# Patient Record
Sex: MALE | Race: WHITE | Employment: OTHER | URBAN - METROPOLITAN AREA
[De-identification: names, ages, dates, MRNs, and addresses within clinical notes are randomized per-mention and may not be internally consistent; named-entity substitution may affect disease eponyms.]

---

## 2020-01-26 ENCOUNTER — APPOINTMENT (OUTPATIENT)
Dept: GENERAL RADIOLOGY | Age: 75
End: 2020-01-26
Payer: COMMERCIAL

## 2020-01-26 ENCOUNTER — HOSPITAL ENCOUNTER (EMERGENCY)
Age: 75
Discharge: HOME | End: 2020-01-27
Attending: EMERGENCY MEDICINE
Payer: COMMERCIAL

## 2020-01-26 VITALS
HEART RATE: 81 BPM | BODY MASS INDEX: 29.18 KG/M2 | DIASTOLIC BLOOD PRESSURE: 62 MMHG | RESPIRATION RATE: 17 BRPM | WEIGHT: 197 LBS | TEMPERATURE: 98.1 F | OXYGEN SATURATION: 94 % | SYSTOLIC BLOOD PRESSURE: 156 MMHG | HEIGHT: 69 IN

## 2020-01-26 LAB
ALBUMIN SERPL-MCNC: 4.5 GM/DL (ref 3.4–5)
ALP BLD-CCNC: 122 IU/L (ref 40–129)
ALT SERPL-CCNC: 36 U/L (ref 10–40)
ANION GAP SERPL CALCULATED.3IONS-SCNC: 14 MMOL/L (ref 4–16)
AST SERPL-CCNC: 73 IU/L (ref 15–37)
BASOPHILS ABSOLUTE: 0 K/CU MM
BASOPHILS RELATIVE PERCENT: 0.3 % (ref 0–1)
BILIRUB SERPL-MCNC: 1.6 MG/DL (ref 0–1)
BUN BLDV-MCNC: 15 MG/DL (ref 6–23)
CALCIUM SERPL-MCNC: 9.2 MG/DL (ref 8.3–10.6)
CHLORIDE BLD-SCNC: 96 MMOL/L (ref 99–110)
CO2: 25 MMOL/L (ref 21–32)
CREAT SERPL-MCNC: 0.8 MG/DL (ref 0.9–1.3)
DIFFERENTIAL TYPE: ABNORMAL
EOSINOPHILS ABSOLUTE: 0.1 K/CU MM
EOSINOPHILS RELATIVE PERCENT: 1.2 % (ref 0–3)
GFR AFRICAN AMERICAN: >60 ML/MIN/1.73M2
GFR NON-AFRICAN AMERICAN: >60 ML/MIN/1.73M2
GLUCOSE BLD-MCNC: 154 MG/DL (ref 70–99)
HCT VFR BLD CALC: 39.9 % (ref 42–52)
HEMOGLOBIN: 12.7 GM/DL (ref 13.5–18)
IMMATURE NEUTROPHIL %: 0.3 % (ref 0–0.43)
INR BLD: 3.18 INDEX
LYMPHOCYTES ABSOLUTE: 1.1 K/CU MM
LYMPHOCYTES RELATIVE PERCENT: 18.2 % (ref 24–44)
MCH RBC QN AUTO: 31.1 PG (ref 27–31)
MCHC RBC AUTO-ENTMCNC: 31.8 % (ref 32–36)
MCV RBC AUTO: 97.8 FL (ref 78–100)
MONOCYTES ABSOLUTE: 0.3 K/CU MM
MONOCYTES RELATIVE PERCENT: 4.4 % (ref 0–4)
NUCLEATED RBC %: 0 %
PDW BLD-RTO: 14 % (ref 11.7–14.9)
PLATELET # BLD: 156 K/CU MM (ref 140–440)
PMV BLD AUTO: 11.4 FL (ref 7.5–11.1)
POTASSIUM SERPL-SCNC: 4 MMOL/L (ref 3.5–5.1)
PROTHROMBIN TIME: 38.9 SECONDS (ref 11.7–14.5)
RBC # BLD: 4.08 M/CU MM (ref 4.6–6.2)
SEGMENTED NEUTROPHILS ABSOLUTE COUNT: 4.5 K/CU MM
SEGMENTED NEUTROPHILS RELATIVE PERCENT: 75.6 % (ref 36–66)
SODIUM BLD-SCNC: 135 MMOL/L (ref 135–145)
TOTAL IMMATURE NEUTOROPHIL: 0.02 K/CU MM
TOTAL NUCLEATED RBC: 0 K/CU MM
TOTAL PROTEIN: 7.2 GM/DL (ref 6.4–8.2)
TROPONIN T: <0.01 NG/ML
TROPONIN T: <0.01 NG/ML
WBC # BLD: 6 K/CU MM (ref 4–10.5)

## 2020-01-26 PROCEDURE — 84484 ASSAY OF TROPONIN QUANT: CPT

## 2020-01-26 PROCEDURE — 71045 X-RAY EXAM CHEST 1 VIEW: CPT

## 2020-01-26 PROCEDURE — 36415 COLL VENOUS BLD VENIPUNCTURE: CPT

## 2020-01-26 PROCEDURE — 93005 ELECTROCARDIOGRAM TRACING: CPT | Performed by: EMERGENCY MEDICINE

## 2020-01-26 PROCEDURE — 85025 COMPLETE CBC W/AUTO DIFF WBC: CPT

## 2020-01-26 PROCEDURE — 80053 COMPREHEN METABOLIC PANEL: CPT

## 2020-01-26 PROCEDURE — 85610 PROTHROMBIN TIME: CPT

## 2020-01-26 PROCEDURE — 6370000000 HC RX 637 (ALT 250 FOR IP): Performed by: EMERGENCY MEDICINE

## 2020-01-26 PROCEDURE — 99285 EMERGENCY DEPT VISIT HI MDM: CPT

## 2020-01-26 RX ORDER — POLYETHYLENE GLYCOL 3350 17 G/17G
17 POWDER, FOR SOLUTION ORAL DAILY PRN
COMMUNITY

## 2020-01-26 RX ORDER — LORATADINE 10 MG/1
10 CAPSULE, LIQUID FILLED ORAL DAILY
COMMUNITY

## 2020-01-26 RX ORDER — LANOLIN ALCOHOL/MO/W.PET/CERES
325 CREAM (GRAM) TOPICAL
COMMUNITY

## 2020-01-26 RX ORDER — CYCLOBENZAPRINE HCL 10 MG
10 TABLET ORAL ONCE
Status: COMPLETED | OUTPATIENT
Start: 2020-01-26 | End: 2020-01-26

## 2020-01-26 RX ORDER — CYCLOBENZAPRINE HCL 10 MG
10 TABLET ORAL 3 TIMES DAILY PRN
Qty: 21 TABLET | Refills: 0 | Status: SHIPPED | OUTPATIENT
Start: 2020-01-26 | End: 2020-02-05

## 2020-01-26 RX ORDER — TAMSULOSIN HYDROCHLORIDE 0.4 MG/1
0.4 CAPSULE ORAL DAILY
COMMUNITY

## 2020-01-26 RX ORDER — GABAPENTIN 400 MG/1
800 CAPSULE ORAL 3 TIMES DAILY
COMMUNITY

## 2020-01-26 RX ORDER — FINASTERIDE 5 MG/1
5 TABLET, FILM COATED ORAL DAILY
COMMUNITY

## 2020-01-26 RX ORDER — MECLIZINE HCL 12.5 MG/1
12.5 TABLET ORAL 2 TIMES DAILY
COMMUNITY

## 2020-01-26 RX ORDER — AMITRIPTYLINE HYDROCHLORIDE 25 MG/1
25 TABLET, FILM COATED ORAL NIGHTLY
COMMUNITY

## 2020-01-26 RX ORDER — WARFARIN SODIUM 2.5 MG/1
3 TABLET ORAL DAILY
COMMUNITY

## 2020-01-26 RX ORDER — AMLODIPINE BESYLATE 5 MG/1
5 TABLET ORAL DAILY
COMMUNITY

## 2020-01-26 RX ORDER — GUAIFENESIN 600 MG/1
600 TABLET, EXTENDED RELEASE ORAL 2 TIMES DAILY
COMMUNITY

## 2020-01-26 RX ORDER — OMEPRAZOLE 20 MG/1
20 CAPSULE, DELAYED RELEASE ORAL DAILY
COMMUNITY

## 2020-01-26 RX ORDER — CELECOXIB 200 MG/1
200 CAPSULE ORAL DAILY
COMMUNITY

## 2020-01-26 RX ORDER — PRENATAL VIT 91/IRON/FOLIC/DHA 28-975-200
COMBINATION PACKAGE (EA) ORAL 2 TIMES DAILY
COMMUNITY

## 2020-01-26 RX ORDER — ATORVASTATIN CALCIUM 10 MG/1
10 TABLET, FILM COATED ORAL DAILY
COMMUNITY

## 2020-01-26 RX ADMIN — CYCLOBENZAPRINE HYDROCHLORIDE 10 MG: 10 TABLET, FILM COATED ORAL at 20:07

## 2020-01-26 ASSESSMENT — PAIN DESCRIPTION - DESCRIPTORS: DESCRIPTORS: DISCOMFORT

## 2020-01-26 ASSESSMENT — ENCOUNTER SYMPTOMS
VOMITING: 0
EYE PAIN: 0
EYE DISCHARGE: 0
SORE THROAT: 0
ABDOMINAL PAIN: 0
BACK PAIN: 1
RHINORRHEA: 0
SHORTNESS OF BREATH: 0
NAUSEA: 0
COUGH: 0

## 2020-01-26 ASSESSMENT — PAIN - FUNCTIONAL ASSESSMENT: PAIN_FUNCTIONAL_ASSESSMENT: ACTIVITIES ARE NOT PREVENTED

## 2020-01-26 ASSESSMENT — PAIN SCALES - GENERAL: PAINLEVEL_OUTOF10: 3

## 2020-01-26 ASSESSMENT — PAIN DESCRIPTION - ORIENTATION: ORIENTATION: UPPER;LEFT;RIGHT

## 2020-01-26 ASSESSMENT — PAIN DESCRIPTION - PAIN TYPE: TYPE: ACUTE PAIN

## 2020-01-26 ASSESSMENT — PAIN DESCRIPTION - FREQUENCY: FREQUENCY: CONTINUOUS

## 2020-01-26 ASSESSMENT — PAIN DESCRIPTION - ONSET: ONSET: SUDDEN

## 2020-01-26 ASSESSMENT — PAIN DESCRIPTION - LOCATION: LOCATION: CHEST

## 2020-01-26 ASSESSMENT — PAIN DESCRIPTION - PROGRESSION: CLINICAL_PROGRESSION: GRADUALLY IMPROVING

## 2020-01-26 NOTE — ED NOTES
Bed: ED-34  Expected date:   Expected time:   Means of arrival:   Comments:  [facility]  01/26/20 5004

## 2020-01-26 NOTE — ED TRIAGE NOTES
Patient arrival via ems. Per EMS, pt was at dinner when he had a sudden onset of chest pain.  No meds given in route, pt is [facility]

## 2020-01-27 PROCEDURE — 93010 ELECTROCARDIOGRAM REPORT: CPT | Performed by: INTERNAL MEDICINE

## 2020-01-27 NOTE — ED PROVIDER NOTES
confusion. Except as noted above the remainder of the review of systems was reviewed and negative. PAST MEDICAL HISTORY     Past Medical History:   Diagnosis Date    Allergic rhinitis     Anxiety     BPH (benign prostatic hyperplasia)     CAD (coronary artery disease)     Dermatitis     GERD (gastroesophageal reflux disease)     Hyperlipidemia     Hypertension     Idiopathic peripheral autonomic neuropathy     Insomnia     Meniere's disease     Osteoarthritis     Polyneuropathy     Pulmonary embolism (HCC)     Urinary retention     Vitamin B12 deficiency anemia        Prior to Admission medications    Medication Sig Start Date End Date Taking?  Authorizing Provider   warfarin (COUMADIN) 2.5 MG tablet Take 6.5 mg by mouth daily   Yes Historical Provider, MD   amitriptyline (ELAVIL) 25 MG tablet Take 25 mg by mouth nightly   Yes Historical Provider, MD   amLODIPine (NORVASC) 5 MG tablet Take 5 mg by mouth daily   Yes Historical Provider, MD   atorvastatin (LIPITOR) 10 MG tablet Take 10 mg by mouth daily   Yes Historical Provider, MD   bisacodyl (DULCOLAX) 5 MG EC tablet Take 10 mg by mouth daily   Yes Historical Provider, MD   celecoxib (CELEBREX) 200 MG capsule Take 200 mg by mouth daily   Yes Historical Provider, MD   ferrous sulfate 325 (65 Fe) MG EC tablet Take 325 mg by mouth daily (with breakfast)   Yes Historical Provider, MD   finasteride (PROSCAR) 5 MG tablet Take 5 mg by mouth daily   Yes Historical Provider, MD   loratadine (CLARITIN) 10 MG capsule Take 10 mg by mouth daily   Yes Historical Provider, MD   omeprazole (PRILOSEC) 20 MG delayed release capsule Take 20 mg by mouth Daily   Yes Historical Provider, MD   tamsulosin (FLOMAX) 0.4 MG capsule Take 0.4 mg by mouth daily   Yes Historical Provider, MD   Cholecalciferol (VITAMIN D3) 125 MCG (5000 UT) TABS Take 5,000 Units by mouth daily   Yes Historical Provider, MD   meclizine (ANTIVERT) 12.5 MG tablet Take 12.5 mg by mouth 2 SUSPENSION    Take 30 mLs by mouth daily as needed for Constipation    MECLIZINE (ANTIVERT) 12.5 MG TABLET    Take 12.5 mg by mouth 2 times daily    OMEPRAZOLE (PRILOSEC) 20 MG DELAYED RELEASE CAPSULE    Take 20 mg by mouth Daily    POLYETHYLENE GLYCOL (GLYCOLAX) PACKET    Take 17 g by mouth daily as needed for Constipation    SODIUM BICARBONATE-CITRIC ACID (LEODAN-SELTZER HEARTBURN PO)    Take 2 tablets by mouth daily as needed    TAMSULOSIN (FLOMAX) 0.4 MG CAPSULE    Take 0.4 mg by mouth daily    TERBINAFINE (LAMISIL) 1 % CREAM    Apply topically 2 times daily Apply topically 2 times daily. WARFARIN (COUMADIN) 2.5 MG TABLET    Take 6.5 mg by mouth daily       ALLERGIES     Sulfa antibiotics and Tramadol    FAMILY HISTORY     No family history on file. SOCIAL HISTORY       Social History     Socioeconomic History    Marital status:       Spouse name: Not on file    Number of children: Not on file    Years of education: Not on file    Highest education level: Not on file   Occupational History    Not on file   Social Needs    Financial resource strain: Not on file    Food insecurity:     Worry: Not on file     Inability: Not on file    Transportation needs:     Medical: Not on file     Non-medical: Not on file   Tobacco Use    Smoking status: Not on file   Substance and Sexual Activity    Alcohol use: Not on file    Drug use: Not on file    Sexual activity: Not on file   Lifestyle    Physical activity:     Days per week: Not on file     Minutes per session: Not on file    Stress: Not on file   Relationships    Social connections:     Talks on phone: Not on file     Gets together: Not on file     Attends Holiness service: Not on file     Active member of club or organization: Not on file     Attends meetings of clubs or organizations: Not on file     Relationship status: Not on file    Intimate partner violence:     Fear of current or ex partner: Not on file     Emotionally abused: Not on file     Physically abused: Not on file     Forced sexual activity: Not on file   Other Topics Concern    Not on file   Social History Narrative    Not on file       SCREENINGS    Paul Coma Scale  Eye Opening: Spontaneous  Best Verbal Response: Oriented  Best Motor Response: Obeys commands  [place] Coma Scale Score: 15          PHYSICAL EXAM    (up to 7 for level 4, 8 or more for level 5)     ED Triage Vitals [01/26/20 1844]   BP Temp Temp Source Pulse Resp SpO2 Height Weight   (!) 142/83 98.1 °F (36.7 °C) Oral 73 12 94 % 5' 9\" (1.753 m) 197 lb (89.4 kg)       Physical Exam  Vitals signs reviewed. Constitutional:       Appearance: He is not ill-appearing or toxic-appearing. HENT:      Head: Normocephalic and atraumatic. Nose: No congestion or rhinorrhea. Mouth/Throat:      Mouth: Mucous membranes are moist.      Pharynx: No oropharyngeal exudate or posterior oropharyngeal erythema. Eyes:      General:         Right eye: No discharge. Left eye: No discharge. Extraocular Movements: Extraocular movements intact. Pupils: Pupils are equal, round, and reactive to light. Neck:      Musculoskeletal: Normal range of motion. No neck rigidity. Cardiovascular:      Rate and Rhythm: Normal rate. Heart sounds: No friction rub. No gallop. Chest:      Chest wall: No tenderness. Abdominal:      Palpations: Abdomen is soft. Tenderness: There is no abdominal tenderness. There is no guarding. Comments: Abdomen soft, non-tender, non-peritoneal  No guarding on abdominal exam   Musculoskeletal:         General: No tenderness. Left lower leg: No edema. Lymphadenopathy:      Cervical: No cervical adenopathy. Skin:     General: Skin is warm. Capillary Refill: Capillary refill takes less than 2 seconds. Findings: No erythema, lesion or rash. Neurological:      General: No focal deficit present.       Mental Status: He is alert and oriented to person, place, She remains vigilant about watching the area for any kind skin eruptions. She is treated symptomatically in the emergency department with improvement in her symptoms. She is discharged home. She is provided simpler measures for home. She will follow-up with her primary care physician. Strict return precautions for worsening concerns were discussed. She is discharged home in stable condition. Amount and/or Complexity of Data Reviewed  Clinical lab tests: reviewed  Tests in the radiology section of CPT®: reviewed          CONSULTS:  None    PROCEDURES:  None performed unless otherwise noted below     Procedures        FINAL IMPRESSION      1. Chest pain, unspecified type          DISPOSITION/PLAN   DISPOSITION        PATIENT REFERRED TO:  No follow-up provider specified. DISCHARGE MEDICATIONS:  New Prescriptions    No medications on file       ED Provider Disposition Time  DISPOSITION        The Patient was instructed to read the package inserts with any medication that was prescribed. Major potential reactions and medication interactions were discussed. The Patient understands that there are numerous possible adverse reactions not covered. The patient was also instructed to arrange follow-up with his or her primary care provider for review of any pending labwork or incidental findings on any radiology results that were obtained. All efforts were made to discuss any incidental findings that require further monitoring. Controlled Substances Monitoring:     No flowsheet data found.     (Please note that portions of this note were completed with a voice recognition program.  Efforts were made to edit the dictations but occasionally words are mis-transcribed.)    Brett Liang MD (electronically signed)  Attending Emergency Physician           Brett Liang MD  01/26/20 6701

## 2020-01-28 ENCOUNTER — APPOINTMENT (OUTPATIENT)
Dept: ULTRASOUND IMAGING | Age: 75
DRG: 446 | End: 2020-01-28
Payer: MEDICARE

## 2020-01-28 ENCOUNTER — APPOINTMENT (OUTPATIENT)
Dept: CT IMAGING | Age: 75
DRG: 446 | End: 2020-01-28
Payer: MEDICARE

## 2020-01-28 ENCOUNTER — HOSPITAL ENCOUNTER (INPATIENT)
Age: 75
LOS: 4 days | Discharge: HOME HEALTH | DRG: 446 | End: 2020-02-01
Attending: EMERGENCY MEDICINE | Admitting: INTERNAL MEDICINE
Payer: MEDICARE

## 2020-01-28 PROBLEM — R17 JAUNDICE: Status: ACTIVE | Noted: 2020-01-28

## 2020-01-28 LAB
ALBUMIN SERPL-MCNC: 3.8 GM/DL (ref 3.4–5)
ALP BLD-CCNC: 165 IU/L (ref 40–129)
ALT SERPL-CCNC: 198 U/L (ref 10–40)
ANION GAP SERPL CALCULATED.3IONS-SCNC: 14 MMOL/L (ref 4–16)
APTT: 63.8 SECONDS (ref 25.1–37.1)
AST SERPL-CCNC: 63 IU/L (ref 15–37)
BACTERIA: NEGATIVE /HPF
BASOPHILS ABSOLUTE: 0 K/CU MM
BASOPHILS RELATIVE PERCENT: 0.4 % (ref 0–1)
BILIRUB SERPL-MCNC: 10 MG/DL (ref 0–1)
BILIRUBIN URINE: ABNORMAL MG/DL
BLOOD, URINE: NEGATIVE
BUN BLDV-MCNC: 10 MG/DL (ref 6–23)
CALCIUM SERPL-MCNC: 8.6 MG/DL (ref 8.3–10.6)
CHLORIDE BLD-SCNC: 100 MMOL/L (ref 99–110)
CLARITY: CLEAR
CO2: 23 MMOL/L (ref 21–32)
COLOR: ABNORMAL
CREAT SERPL-MCNC: 0.7 MG/DL (ref 0.9–1.3)
DIFFERENTIAL TYPE: ABNORMAL
EOSINOPHILS ABSOLUTE: 0.3 K/CU MM
EOSINOPHILS RELATIVE PERCENT: 5.6 % (ref 0–3)
GFR AFRICAN AMERICAN: >60 ML/MIN/1.73M2
GFR NON-AFRICAN AMERICAN: >60 ML/MIN/1.73M2
GLUCOSE BLD-MCNC: 122 MG/DL (ref 70–99)
GLUCOSE, URINE: NEGATIVE MG/DL
HAV IGM SER IA-ACNC: NON REACTIVE
HCT VFR BLD CALC: 44.6 % (ref 42–52)
HEMOGLOBIN: 13.9 GM/DL (ref 13.5–18)
HEPATITIS B CORE IGM ANTIBODY: NON REACTIVE
HEPATITIS B SURFACE ANTIGEN: NON REACTIVE
HEPATITIS C ANTIBODY: NON REACTIVE
ICTOTEST: POSITIVE
IMMATURE NEUTROPHIL %: 0.4 % (ref 0–0.43)
INR BLD: 2.18 INDEX
KETONES, URINE: NEGATIVE MG/DL
LEUKOCYTE ESTERASE, URINE: NEGATIVE
LIPASE: 13 IU/L (ref 13–60)
LYMPHOCYTES ABSOLUTE: 1.2 K/CU MM
LYMPHOCYTES RELATIVE PERCENT: 22.4 % (ref 24–44)
MCH RBC QN AUTO: 30.3 PG (ref 27–31)
MCHC RBC AUTO-ENTMCNC: 31.2 % (ref 32–36)
MCV RBC AUTO: 97.4 FL (ref 78–100)
MONOCYTES ABSOLUTE: 0.3 K/CU MM
MONOCYTES RELATIVE PERCENT: 5.3 % (ref 0–4)
NITRITE URINE, QUANTITATIVE: NEGATIVE
NUCLEATED RBC %: 0 %
PDW BLD-RTO: 14.4 % (ref 11.7–14.9)
PH, URINE: 6 (ref 5–8)
PLATELET # BLD: 194 K/CU MM (ref 140–440)
PMV BLD AUTO: 10.7 FL (ref 7.5–11.1)
POTASSIUM SERPL-SCNC: 3.9 MMOL/L (ref 3.5–5.1)
PROTEIN UA: 30 MG/DL
PROTHROMBIN TIME: 26.6 SECONDS (ref 11.7–14.5)
RBC # BLD: 4.58 M/CU MM (ref 4.6–6.2)
RBC URINE: 1 /HPF (ref 0–3)
SEGMENTED NEUTROPHILS ABSOLUTE COUNT: 3.5 K/CU MM
SEGMENTED NEUTROPHILS RELATIVE PERCENT: 65.9 % (ref 36–66)
SODIUM BLD-SCNC: 137 MMOL/L (ref 135–145)
SPECIFIC GRAVITY UA: 1.03 (ref 1–1.03)
SPECIFIC GRAVITY UA: ABNORMAL (ref 1–1.03)
TOTAL IMMATURE NEUTOROPHIL: 0.02 K/CU MM
TOTAL NUCLEATED RBC: 0 K/CU MM
TOTAL PROTEIN: 6.9 GM/DL (ref 6.4–8.2)
TRICHOMONAS: ABNORMAL /HPF
UROBILINOGEN, URINE: 2 MG/DL (ref 0.2–1)
WBC # BLD: 5.3 K/CU MM (ref 4–10.5)
WBC UA: 1 /HPF (ref 0–2)

## 2020-01-28 PROCEDURE — 81001 URINALYSIS AUTO W/SCOPE: CPT

## 2020-01-28 PROCEDURE — 1200000000 HC SEMI PRIVATE

## 2020-01-28 PROCEDURE — 99285 EMERGENCY DEPT VISIT HI MDM: CPT

## 2020-01-28 PROCEDURE — 74177 CT ABD & PELVIS W/CONTRAST: CPT

## 2020-01-28 PROCEDURE — 36415 COLL VENOUS BLD VENIPUNCTURE: CPT

## 2020-01-28 PROCEDURE — 80053 COMPREHEN METABOLIC PANEL: CPT

## 2020-01-28 PROCEDURE — 85610 PROTHROMBIN TIME: CPT

## 2020-01-28 PROCEDURE — 80074 ACUTE HEPATITIS PANEL: CPT

## 2020-01-28 PROCEDURE — 85025 COMPLETE CBC W/AUTO DIFF WBC: CPT

## 2020-01-28 PROCEDURE — 85730 THROMBOPLASTIN TIME PARTIAL: CPT

## 2020-01-28 PROCEDURE — 76705 ECHO EXAM OF ABDOMEN: CPT

## 2020-01-28 PROCEDURE — 6360000004 HC RX CONTRAST MEDICATION: Performed by: PHYSICIAN ASSISTANT

## 2020-01-28 PROCEDURE — 83690 ASSAY OF LIPASE: CPT

## 2020-01-28 RX ORDER — TAMSULOSIN HYDROCHLORIDE 0.4 MG/1
0.4 CAPSULE ORAL DAILY
Status: DISCONTINUED | OUTPATIENT
Start: 2020-01-29 | End: 2020-02-01 | Stop reason: HOSPADM

## 2020-01-28 RX ORDER — SODIUM CHLORIDE 0.9 % (FLUSH) 0.9 %
10 SYRINGE (ML) INJECTION EVERY 12 HOURS SCHEDULED
Status: DISCONTINUED | OUTPATIENT
Start: 2020-01-28 | End: 2020-02-01 | Stop reason: HOSPADM

## 2020-01-28 RX ORDER — ONDANSETRON 2 MG/ML
4 INJECTION INTRAMUSCULAR; INTRAVENOUS EVERY 6 HOURS PRN
Status: DISCONTINUED | OUTPATIENT
Start: 2020-01-28 | End: 2020-02-01 | Stop reason: HOSPADM

## 2020-01-28 RX ORDER — ACETAMINOPHEN 325 MG/1
650 TABLET ORAL EVERY 4 HOURS PRN
Status: DISCONTINUED | OUTPATIENT
Start: 2020-01-28 | End: 2020-02-01 | Stop reason: HOSPADM

## 2020-01-28 RX ORDER — SODIUM CHLORIDE 0.9 % (FLUSH) 0.9 %
10 SYRINGE (ML) INJECTION PRN
Status: DISCONTINUED | OUTPATIENT
Start: 2020-01-28 | End: 2020-02-01 | Stop reason: HOSPADM

## 2020-01-28 RX ORDER — AMLODIPINE BESYLATE 5 MG/1
5 TABLET ORAL DAILY
Status: DISCONTINUED | OUTPATIENT
Start: 2020-01-29 | End: 2020-02-01 | Stop reason: HOSPADM

## 2020-01-28 RX ORDER — ATORVASTATIN CALCIUM 10 MG/1
10 TABLET, FILM COATED ORAL DAILY
Status: DISCONTINUED | OUTPATIENT
Start: 2020-01-29 | End: 2020-02-01 | Stop reason: HOSPADM

## 2020-01-28 RX ADMIN — IOPAMIDOL 75 ML: 755 INJECTION, SOLUTION INTRAVENOUS at 20:30

## 2020-01-28 SDOH — HEALTH STABILITY: MENTAL HEALTH: HOW OFTEN DO YOU HAVE A DRINK CONTAINING ALCOHOL?: NEVER

## 2020-01-28 ASSESSMENT — PAIN SCALES - GENERAL
PAINLEVEL_OUTOF10: 0
PAINLEVEL_OUTOF10: 0

## 2020-01-29 ENCOUNTER — APPOINTMENT (OUTPATIENT)
Dept: MRI IMAGING | Age: 75
DRG: 446 | End: 2020-01-29
Payer: MEDICARE

## 2020-01-29 LAB
ALBUMIN SERPL-MCNC: 3.8 GM/DL (ref 3.4–5)
ALP BLD-CCNC: 148 IU/L (ref 40–128)
ALT SERPL-CCNC: 158 U/L (ref 10–40)
ANION GAP SERPL CALCULATED.3IONS-SCNC: 11 MMOL/L (ref 4–16)
AST SERPL-CCNC: 44 IU/L (ref 15–37)
BASOPHILS ABSOLUTE: 0 K/CU MM
BASOPHILS RELATIVE PERCENT: 0.4 % (ref 0–1)
BILIRUB SERPL-MCNC: 7.4 MG/DL (ref 0–1)
BILIRUB SERPL-MCNC: 7.4 MG/DL (ref 0–1)
BILIRUBIN DIRECT: 5.7 MG/DL (ref 0–0.3)
BILIRUBIN, INDIRECT: 1.7 MG/DL (ref 0–0.7)
BUN BLDV-MCNC: 10 MG/DL (ref 6–23)
CALCIUM SERPL-MCNC: 8.5 MG/DL (ref 8.3–10.6)
CHLORIDE BLD-SCNC: 103 MMOL/L (ref 99–110)
CO2: 21 MMOL/L (ref 21–32)
CREAT SERPL-MCNC: 0.6 MG/DL (ref 0.9–1.3)
DIFFERENTIAL TYPE: ABNORMAL
EOSINOPHILS ABSOLUTE: 0.3 K/CU MM
EOSINOPHILS RELATIVE PERCENT: 5.8 % (ref 0–3)
GFR AFRICAN AMERICAN: >60 ML/MIN/1.73M2
GFR NON-AFRICAN AMERICAN: >60 ML/MIN/1.73M2
GLUCOSE BLD-MCNC: 124 MG/DL (ref 70–99)
HCT VFR BLD CALC: 41.4 % (ref 42–52)
HEMOGLOBIN: 12.9 GM/DL (ref 13.5–18)
IMMATURE NEUTROPHIL %: 0.2 % (ref 0–0.43)
LYMPHOCYTES ABSOLUTE: 1.4 K/CU MM
LYMPHOCYTES RELATIVE PERCENT: 25.6 % (ref 24–44)
MAGNESIUM: 2.1 MG/DL (ref 1.8–2.4)
MCH RBC QN AUTO: 30.9 PG (ref 27–31)
MCHC RBC AUTO-ENTMCNC: 31.2 % (ref 32–36)
MCV RBC AUTO: 99.3 FL (ref 78–100)
MONOCYTES ABSOLUTE: 0.4 K/CU MM
MONOCYTES RELATIVE PERCENT: 7.1 % (ref 0–4)
NUCLEATED RBC %: 0 %
PDW BLD-RTO: 14.6 % (ref 11.7–14.9)
PLATELET # BLD: 187 K/CU MM (ref 140–440)
PMV BLD AUTO: 10.5 FL (ref 7.5–11.1)
POTASSIUM SERPL-SCNC: 3.7 MMOL/L (ref 3.5–5.1)
RBC # BLD: 4.17 M/CU MM (ref 4.6–6.2)
SEGMENTED NEUTROPHILS ABSOLUTE COUNT: 3.3 K/CU MM
SEGMENTED NEUTROPHILS RELATIVE PERCENT: 60.9 % (ref 36–66)
SODIUM BLD-SCNC: 135 MMOL/L (ref 135–145)
TOTAL IMMATURE NEUTOROPHIL: 0.01 K/CU MM
TOTAL NUCLEATED RBC: 0 K/CU MM
TOTAL PROTEIN: 6 GM/DL (ref 6.4–8.2)
WBC # BLD: 5.4 K/CU MM (ref 4–10.5)

## 2020-01-29 PROCEDURE — 6370000000 HC RX 637 (ALT 250 FOR IP): Performed by: INTERNAL MEDICINE

## 2020-01-29 PROCEDURE — 2580000003 HC RX 258: Performed by: INTERNAL MEDICINE

## 2020-01-29 PROCEDURE — 94761 N-INVAS EAR/PLS OXIMETRY MLT: CPT

## 2020-01-29 PROCEDURE — 83735 ASSAY OF MAGNESIUM: CPT

## 2020-01-29 PROCEDURE — 82248 BILIRUBIN DIRECT: CPT

## 2020-01-29 PROCEDURE — 80053 COMPREHEN METABOLIC PANEL: CPT

## 2020-01-29 PROCEDURE — 1200000000 HC SEMI PRIVATE

## 2020-01-29 PROCEDURE — 85025 COMPLETE CBC W/AUTO DIFF WBC: CPT

## 2020-01-29 RX ORDER — GABAPENTIN 400 MG/1
800 CAPSULE ORAL 3 TIMES DAILY
Status: DISCONTINUED | OUTPATIENT
Start: 2020-01-29 | End: 2020-02-01 | Stop reason: HOSPADM

## 2020-01-29 RX ORDER — FINASTERIDE 5 MG/1
5 TABLET, FILM COATED ORAL DAILY
Status: DISCONTINUED | OUTPATIENT
Start: 2020-01-29 | End: 2020-02-01 | Stop reason: HOSPADM

## 2020-01-29 RX ORDER — CYCLOBENZAPRINE HCL 10 MG
10 TABLET ORAL 3 TIMES DAILY PRN
Status: DISCONTINUED | OUTPATIENT
Start: 2020-01-29 | End: 2020-02-01 | Stop reason: HOSPADM

## 2020-01-29 RX ORDER — CETIRIZINE HYDROCHLORIDE 10 MG/1
10 TABLET ORAL DAILY
Status: DISCONTINUED | OUTPATIENT
Start: 2020-01-29 | End: 2020-02-01 | Stop reason: HOSPADM

## 2020-01-29 RX ORDER — FERROUS SULFATE 325(65) MG
325 TABLET ORAL
Status: DISCONTINUED | OUTPATIENT
Start: 2020-01-30 | End: 2020-02-01 | Stop reason: HOSPADM

## 2020-01-29 RX ADMIN — FINASTERIDE 5 MG: 5 TABLET, FILM COATED ORAL at 17:37

## 2020-01-29 RX ADMIN — Medication 5000 UNITS: at 17:37

## 2020-01-29 RX ADMIN — AMLODIPINE BESYLATE 5 MG: 5 TABLET ORAL at 09:34

## 2020-01-29 RX ADMIN — GABAPENTIN 800 MG: 400 CAPSULE ORAL at 17:37

## 2020-01-29 RX ADMIN — CYCLOBENZAPRINE 10 MG: 10 TABLET, FILM COATED ORAL at 17:37

## 2020-01-29 RX ADMIN — CETIRIZINE HYDROCHLORIDE 10 MG: 10 TABLET, FILM COATED ORAL at 17:37

## 2020-01-29 RX ADMIN — GABAPENTIN 800 MG: 400 CAPSULE ORAL at 21:30

## 2020-01-29 RX ADMIN — TAMSULOSIN HYDROCHLORIDE 0.4 MG: 0.4 CAPSULE ORAL at 09:34

## 2020-01-29 RX ADMIN — SODIUM CHLORIDE, PRESERVATIVE FREE 10 ML: 5 INJECTION INTRAVENOUS at 21:30

## 2020-01-29 RX ADMIN — ATORVASTATIN CALCIUM 10 MG: 10 TABLET, FILM COATED ORAL at 09:34

## 2020-01-29 RX ADMIN — SODIUM CHLORIDE, PRESERVATIVE FREE 10 ML: 5 INJECTION INTRAVENOUS at 09:34

## 2020-01-29 RX ADMIN — SODIUM CHLORIDE, PRESERVATIVE FREE 10 ML: 5 INJECTION INTRAVENOUS at 00:09

## 2020-01-29 ASSESSMENT — PAIN SCALES - GENERAL
PAINLEVEL_OUTOF10: 0

## 2020-01-29 NOTE — CONSULTS
Department of Internal Medicine  Gastroenterology Consult Note  Guy Zurita MD      Reason for Consult:  jaundice    Primary Care Physician:  Judi Sargent DO    History Obtained From:  patient    HISTORY OF PRESENT ILLNESS:              The patient is a 75 y.o.  male admitted with elevated LFT's and jaundice. He was well until 4 days ago when he noted back pain radiating around to his lower chest. He came to the ED and was found to have mildly elevated LFT's. He was discharged back to the [facility] where 2 days ago he was noted to be jaundiced with worsened LFT's. He was then sent back to the ED. CTand US showed gallstones with no biliary dilatation. He had hepatitis years ago but denies other liver disease. He denies alcohol he had a negative colonoscopy about 4-5 years ago in [place]. He has some chronic constipation but denies blood in the stool or vomiting. He has taken no new meds. he is on Coumadin. Past Medical History:        Diagnosis Date    Allergic rhinitis     Anxiety     BPH (benign prostatic hyperplasia)     CAD (coronary artery disease)     Dermatitis     GERD (gastroesophageal reflux disease)     Hyperlipidemia     Hypertension     Idiopathic peripheral autonomic neuropathy     Insomnia     Meniere's disease     Osteoarthritis     Polyneuropathy     Pulmonary embolism (HCC)     Urinary retention     Vitamin B12 deficiency anemia        Past Surgical History:        Procedure Laterality Date    ABOVE KNEE AMPUTATION Right     APPENDECTOMY      TONSILLECTOMY         Medications Prior to Admission:    Prior to Admission medications    Medication Sig Start Date End Date Taking?  Authorizing Provider   warfarin (COUMADIN) 2.5 MG tablet Take 6.5 mg by mouth daily   Yes Historical Provider, MD   amLODIPine (NORVASC) 5 MG tablet Take 5 mg by mouth daily   Yes Historical Provider, MD   atorvastatin (LIPITOR) 10 MG tablet Take 10 mg by mouth daily   Yes Historical Provider, MD   bisacodyl (DULCOLAX) 5 MG EC tablet Take 10 mg by mouth daily   Yes Historical Provider, MD   ferrous sulfate 325 (65 Fe) MG EC tablet Take 325 mg by mouth daily (with breakfast)   Yes Historical Provider, MD   finasteride (PROSCAR) 5 MG tablet Take 5 mg by mouth daily   Yes Historical Provider, MD   loratadine (CLARITIN) 10 MG capsule Take 10 mg by mouth daily   Yes Historical Provider, MD   meclizine (ANTIVERT) 12.5 MG tablet Take 12.5 mg by mouth 2 times daily   Yes Historical Provider, MD   guaiFENesin (MUCINEX) 600 MG extended release tablet Take 1,200 mg by mouth 2 times daily   Yes Historical Provider, MD   gabapentin (NEURONTIN) 400 MG capsule Take 800 mg by mouth 3 times daily. Yes Historical Provider, MD   Sodium Bicarbonate-Citric Acid (LEODAN-SELTZER HEARTBURN PO) Take 2 tablets by mouth daily as needed   Yes Historical Provider, MD   cyclobenzaprine (FLEXERIL) 10 MG tablet Take 1 tablet by mouth 3 times daily as needed for Muscle spasms 1/26/20 2/5/20 Yes Carl De La Cruz MD   amitriptyline (ELAVIL) 25 MG tablet Take 25 mg by mouth nightly    Historical Provider, MD   celecoxib (CELEBREX) 200 MG capsule Take 200 mg by mouth daily    Historical Provider, MD   omeprazole (PRILOSEC) 20 MG delayed release capsule Take 20 mg by mouth Daily    Historical Provider, MD   tamsulosin (FLOMAX) 0.4 MG capsule Take 0.4 mg by mouth daily    Historical Provider, MD   Cholecalciferol (VITAMIN D3) 125 MCG (5000 UT) TABS Take 5,000 Units by mouth daily    Historical Provider, MD   magnesium hydroxide (MILK OF MAGNESIA) 400 MG/5ML suspension Take 30 mLs by mouth daily as needed for Constipation    Historical Provider, MD   polyethylene glycol (GLYCOLAX) packet Take 17 g by mouth daily as needed for Constipation    Historical Provider, MD   terbinafine (LAMISIL) 1 % cream Apply topically 2 times daily Apply topically 2 times daily. Historical Provider, MD       Allergies:     Allergies   Allergen

## 2020-01-29 NOTE — ED PROVIDER NOTES
Emergency Department Encounter    Patient: Susy Yoder  MRN: [id number]  : 1945  Date of Evaluation: 2020  ED Provider:  Juhi Juarez    Triage Chief Complaint:   Jaundice (Pt from [facility] the [facility] assisted living, paperwork reports pt has been having increased jaundice since ) and Other (abnormal labs, elevated Bilirubin, AST, ALT)    [place]:  Susy Yoder is a 75 y.o. male that presents with concern for jaundice. From assisted living facility. Has been having yellowing of skin since the . He was seen here on the  for chest pain, over the center of his chest and radiated to his back. He states he was evaluated and sent home. He has had no episodes of pain since that time. It was not in his abdomen. He had no nausea or vomiting. No sweating. No shortness of breath. He is not currently having any mid abdominal pain. He had abnormal labs at the assisted living facility. Sent in for evaluation. No dysuria or hematuria. No blood in stools. No diarrhea. He denies pain at all at this time. ROS - see HPI, below listed is current ROS at time of my eval:  10 systems reviewed and negative except as above. Past Medical History:   Diagnosis Date    Allergic rhinitis     Anxiety     BPH (benign prostatic hyperplasia)     CAD (coronary artery disease)     Dermatitis     GERD (gastroesophageal reflux disease)     Hyperlipidemia     Hypertension     Idiopathic peripheral autonomic neuropathy     Insomnia     Meniere's disease     Osteoarthritis     Polyneuropathy     Pulmonary embolism (HCC)     Urinary retention     Vitamin B12 deficiency anemia      Past Surgical History:   Procedure Laterality Date    ABOVE KNEE AMPUTATION Right     APPENDECTOMY      TONSILLECTOMY       History reviewed. No pertinent family history. Social History     Socioeconomic History    Marital status:       Spouse name: Not on file    Number of children: Not on file    Years of education: Not on file    Highest education level: Not on file   Occupational History    Not on file   Social Needs    Financial resource strain: Not on file    Food insecurity:     Worry: Not on file     Inability: Not on file    Transportation needs:     Medical: Not on file     Non-medical: Not on file   Tobacco Use    Smoking status: Former Smoker    Smokeless tobacco: Never Used   Substance and Sexual Activity    Alcohol use: Never     Frequency: Never    Drug use: Never    Sexual activity: Not on file   Lifestyle    Physical activity:     Days per week: Not on file     Minutes per session: Not on file    Stress: Not on file   Relationships    Social connections:     Talks on phone: Not on file     Gets together: Not on file     Attends Rastafari service: Not on file     Active member of club or organization: Not on file     Attends meetings of clubs or organizations: Not on file     Relationship status: Not on file    Intimate partner violence:     Fear of current or ex partner: Not on file     Emotionally abused: Not on file     Physically abused: Not on file     Forced sexual activity: Not on file   Other Topics Concern    Not on file   Social History Narrative    Not on file     No current facility-administered medications for this encounter.       Current Outpatient Medications   Medication Sig Dispense Refill    warfarin (COUMADIN) 2.5 MG tablet Take 6.5 mg by mouth daily      amitriptyline (ELAVIL) 25 MG tablet Take 25 mg by mouth nightly      amLODIPine (NORVASC) 5 MG tablet Take 5 mg by mouth daily      atorvastatin (LIPITOR) 10 MG tablet Take 10 mg by mouth daily      bisacodyl (DULCOLAX) 5 MG EC tablet Take 10 mg by mouth daily      celecoxib (CELEBREX) 200 MG capsule Take 200 mg by mouth daily      ferrous sulfate 325 (65 Fe) MG EC tablet Take 325 mg by mouth daily (with breakfast)      finasteride (PROSCAR) 5 MG tablet Take 5 mg by mouth daily      loratadine (CLARITIN) 10 MG capsule Take 10 mg by mouth daily      omeprazole (PRILOSEC) 20 MG delayed release capsule Take 20 mg by mouth Daily      tamsulosin (FLOMAX) 0.4 MG capsule Take 0.4 mg by mouth daily      Cholecalciferol (VITAMIN D3) 125 MCG (5000 UT) TABS Take 5,000 Units by mouth daily      meclizine (ANTIVERT) 12.5 MG tablet Take 12.5 mg by mouth 2 times daily      guaiFENesin (MUCINEX) 600 MG extended release tablet Take 1,200 mg by mouth 2 times daily      gabapentin (NEURONTIN) 400 MG capsule Take 800 mg by mouth 3 times daily.  Sodium Bicarbonate-Citric Acid (LEODAN-SELTZER HEARTBURN PO) Take 2 tablets by mouth daily as needed      magnesium hydroxide (MILK OF MAGNESIA) 400 MG/5ML suspension Take 30 mLs by mouth daily as needed for Constipation      polyethylene glycol (GLYCOLAX) packet Take 17 g by mouth daily as needed for Constipation      terbinafine (LAMISIL) 1 % cream Apply topically 2 times daily Apply topically 2 times daily.  cyclobenzaprine (FLEXERIL) 10 MG tablet Take 1 tablet by mouth 3 times daily as needed for Muscle spasms 21 tablet 0     Allergies   Allergen Reactions    Sulfa Antibiotics Other (See Comments)     unknown    Tramadol Other (See Comments)     Shaking       Nursing Notes Reviewed    Physical Exam:  Triage VS:    ED Triage Vitals [01/28/20 1711]   Enc Vitals Group      /71      Pulse 77      Resp 16      Temp 98.2 °F (36.8 °C)      Temp Source Oral      SpO2 95 %      Weight 197 lb (89.4 kg)      Height 5' 10\" (1.778 m)      Head Circumference       Peak Flow       Pain Score       Pain Loc       Pain Edu? Excl. in [address]? My pulse ox interpretation is - normal    General appearance:  No acute distress. Skin:  Warm. Dry. Jaundice noted. Eye:  Extraocular movements intact. + scleral icterus    Ears, nose, mouth and throat:  Oral mucosa moist   Neck:  Trachea midline. Extremity:  No swelling.   Normal ROM    Heart:  Regular rate and rhythm, normal S1 & S2, no extra heart sounds. Perfusion:  intact  Respiratory:  Lungs clear to auscultation bilaterally. Respirations nonlabored. Abdominal:   Soft. Nontender. Non distended.   Neurological:  Alert and oriented           Psychiatric:  Appropriate    I have reviewed and interpreted all of the currently available lab results from this visit (if applicable):  Results for orders placed or performed during the hospital encounter of 01/28/20   CBC Auto Differential   Result Value Ref Range    WBC 5.3 4.0 - 10.5 K/CU MM    RBC 4.58 (L) 4.6 - 6.2 M/CU MM    Hemoglobin 13.9 13.5 - 18.0 GM/DL    Hematocrit 44.6 42 - 52 %    MCV 97.4 78 - 100 FL    MCH 30.3 27 - 31 PG    MCHC 31.2 (L) 32.0 - 36.0 %    RDW 14.4 11.7 - 14.9 %    Platelets 097 437 - 059 K/CU MM    MPV 10.7 7.5 - 11.1 FL    Differential Type AUTOMATED DIFFERENTIAL     Segs Relative 65.9 36 - 66 %    Lymphocytes % 22.4 (L) 24 - 44 %    Monocytes % 5.3 (H) 0 - 4 %    Eosinophils % 5.6 (H) 0 - 3 %    Basophils % 0.4 0 - 1 %    Segs Absolute 3.5 K/CU MM    Lymphocytes Absolute 1.2 K/CU MM    Monocytes Absolute 0.3 K/CU MM    Eosinophils Absolute 0.3 K/CU MM    Basophils Absolute 0.0 K/CU MM    Nucleated RBC % 0.0 %    Total Nucleated RBC 0.0 K/CU MM    Total Immature Neutrophil 0.02 K/CU MM    Immature Neutrophil % 0.4 0 - 0.43 %   CMP   Result Value Ref Range    Sodium 137 135 - 145 MMOL/L    Potassium 3.9 3.5 - 5.1 MMOL/L    Chloride 100 99 - 110 mMol/L    CO2 23 21 - 32 MMOL/L    BUN 10 6 - 23 MG/DL    CREATININE 0.7 (L) 0.9 - 1.3 MG/DL    Glucose 122 (H) 70 - 99 MG/DL    Calcium 8.6 8.3 - 10.6 MG/DL    Alb 3.8 3.4 - 5.0 GM/DL    Total Protein 6.9 6.4 - 8.2 GM/DL    Total Bilirubin 10.0 (H) 0.0 - 1.0 MG/DL     (H) 10 - 40 U/L    AST 63 (H) 15 - 37 IU/L    Alkaline Phosphatase 165 (H) 40 - 129 IU/L    GFR Non-African American >60 >60 mL/min/1.73m2    GFR African American >60 >60 mL/min/1.73m2    Anion Gap 14 4 - 16   Lipase   Result Value Ref Range without evidence of acute cholecystitis. 2.  Fatty/fibrotic infiltration of the liver. EKG (if obtained): (All EKG's are interpreted by myself in the absence of a cardiologist)      MDM:  76-year-old male with history as above presents with new jaundice and abnormal labs. Labs and ultrasound ordered. Shows cholelithiasis, normal common bile duct. CT ordered, he has a total bili of 10, painless jaundice, would be concern for potential choledocholithiasis versus a pancreatic mass. CT however is being read as no abnormalities other than some fatty liver and the cholelithiasis. I called and spoke with Dr. Eloy Avalos from GI team, he agrees that he would need admitted for MRCP and further evaluation. Patient is agreeable to this, he is in no distress, plan for hospitalist consultation for admission. Clinical Impression:  1. Hyperbilirubinemia    2. Jaundice      Disposition referral (if applicable):  No follow-up provider specified. Disposition medications (if applicable):  New Prescriptions    No medications on file     ED Provider Disposition Time  DISPOSITION Decision To Admit 01/28/2020 09:10:59 PM      Comment: Please note this report has been produced using speech recognition software and may contain errors related to that system including errors in grammar, punctuation, and spelling, as well as words and phrases that may be inappropriate. Efforts were made to edit the dictations.         Romayne Kobs, MD  01/28/20 8246

## 2020-01-29 NOTE — H&P
daily      celecoxib (CELEBREX) 200 MG capsule Take 200 mg by mouth daily      ferrous sulfate 325 (65 Fe) MG EC tablet Take 325 mg by mouth daily (with breakfast)      finasteride (PROSCAR) 5 MG tablet Take 5 mg by mouth daily      loratadine (CLARITIN) 10 MG capsule Take 10 mg by mouth daily      omeprazole (PRILOSEC) 20 MG delayed release capsule Take 20 mg by mouth Daily      tamsulosin (FLOMAX) 0.4 MG capsule Take 0.4 mg by mouth daily      Cholecalciferol (VITAMIN D3) 125 MCG (5000 UT) TABS Take 5,000 Units by mouth daily      meclizine (ANTIVERT) 12.5 MG tablet Take 12.5 mg by mouth 2 times daily      guaiFENesin (MUCINEX) 600 MG extended release tablet Take 1,200 mg by mouth 2 times daily      gabapentin (NEURONTIN) 400 MG capsule Take 800 mg by mouth 3 times daily.  Sodium Bicarbonate-Citric Acid (LEODAN-SELTZER HEARTBURN PO) Take 2 tablets by mouth daily as needed      magnesium hydroxide (MILK OF MAGNESIA) 400 MG/5ML suspension Take 30 mLs by mouth daily as needed for Constipation      polyethylene glycol (GLYCOLAX) packet Take 17 g by mouth daily as needed for Constipation      terbinafine (LAMISIL) 1 % cream Apply topically 2 times daily Apply topically 2 times daily.  cyclobenzaprine (FLEXERIL) 10 MG tablet Take 1 tablet by mouth 3 times daily as needed for Muscle spasms 21 tablet 0         Allergies  Allergies   Allergen Reactions    Sulfa Antibiotics Other (See Comments)     unknown    Tramadol Other (See Comments)     Shaking       REVIEW OF SYSTEMS   Within above limitations. 14 point review of systems reviewed. Pertinent positive or negative as per HPI or otherwise negative per 14 point systems review. PHYSICAL EXAM     Wt Readings from Last 3 Encounters:   01/28/20 197 lb (89.4 kg)   01/26/20 197 lb (89.4 kg)       Blood pressure 132/69, pulse 76, temperature 98.2 °F (36.8 °C), temperature source Oral, resp.  rate 18, height 5' 10\" (1.778 m), weight 197 lb (89.4 kg), SpO2 94 %. General - AAO x 3, icteric  Psych - Appropriate affect/speech. No agitation  Eyes - Eye lids intact. No scleral icterus  ENT - Lips wnl. External ear clear/dry/intact. No thyromegaly on inspection, icterus  Neuro - No gross peripheral or central neuro deficits on inspection  Heart - Sinus. RRR. S1 and S2 present. No added HS/murmurs appreciated. No elevated JVD appreciated  Lung - Adequate air entry b/l, No crackles/wheezes appreciated  GI - Soft. No guarding/rigidity. No hepatosplenomegaly/ascites. BS+   - No CVA/suprapubic tenderness or palpable bladder distension  Skin - Intact. No rash/petechiae/ecchymosis. Warm extremities; yellow skin  MSK - left AKA    Lines/Drains/Airways/Wounds:  [unfilled]    LABS AND IMAGING   CBC  [unfilled]    Last 3 Hemoglobin  Lab Results   Component Value Date    HGB 13.9 01/28/2020    HGB 12.7 01/26/2020     Last 3 WBC/ANC  Lab Results   Component Value Date    WBC 5.3 01/28/2020    WBC 6.0 01/26/2020     No components found for: GRNLOCTYABS  Last 3 Platelets  No results found for: PLATELET  Chemistry  [unfilled]  [unfilled]  No results found for: LDH  Coagulation Studies  Lab Results   Component Value Date    INR 3.18 01/26/2020     Liver Function Studies  Lab Results   Component Value Date     01/28/2020    AST 63 01/28/2020    ALKPHOS 165 01/28/2020       Recent Imaging        Relevant labs and imaging reviewed    ASSESSMENT AND PLAN   Porter Rodriguez. Vivian Flanagan is a 75 y.o. 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

## 2020-01-30 LAB
ALBUMIN SERPL-MCNC: 4.2 GM/DL (ref 3.4–5)
ALP BLD-CCNC: 181 IU/L (ref 40–129)
ALT SERPL-CCNC: 118 U/L (ref 10–40)
ANION GAP SERPL CALCULATED.3IONS-SCNC: 13 MMOL/L (ref 4–16)
AST SERPL-CCNC: 29 IU/L (ref 15–37)
BILIRUB SERPL-MCNC: 4.4 MG/DL (ref 0–1)
BUN BLDV-MCNC: 12 MG/DL (ref 6–23)
CALCIUM SERPL-MCNC: 9.3 MG/DL (ref 8.3–10.6)
CHLORIDE BLD-SCNC: 103 MMOL/L (ref 99–110)
CO2: 23 MMOL/L (ref 21–32)
CREAT SERPL-MCNC: 0.6 MG/DL (ref 0.9–1.3)
GFR AFRICAN AMERICAN: >60 ML/MIN/1.73M2
GFR NON-AFRICAN AMERICAN: >60 ML/MIN/1.73M2
GLUCOSE BLD-MCNC: 132 MG/DL (ref 70–99)
HCT VFR BLD CALC: 43.2 % (ref 42–52)
HEMOGLOBIN: 13.8 GM/DL (ref 13.5–18)
MCH RBC QN AUTO: 30.7 PG (ref 27–31)
MCHC RBC AUTO-ENTMCNC: 31.9 % (ref 32–36)
MCV RBC AUTO: 96.2 FL (ref 78–100)
PDW BLD-RTO: 14.6 % (ref 11.7–14.9)
PLATELET # BLD: 224 K/CU MM (ref 140–440)
PMV BLD AUTO: 10.5 FL (ref 7.5–11.1)
POTASSIUM SERPL-SCNC: 3.8 MMOL/L (ref 3.5–5.1)
RBC # BLD: 4.49 M/CU MM (ref 4.6–6.2)
SODIUM BLD-SCNC: 139 MMOL/L (ref 135–145)
TOTAL PROTEIN: 7 GM/DL (ref 6.4–8.2)
WBC # BLD: 5 K/CU MM (ref 4–10.5)

## 2020-01-30 PROCEDURE — 1200000000 HC SEMI PRIVATE

## 2020-01-30 PROCEDURE — 6360000002 HC RX W HCPCS: Performed by: INTERNAL MEDICINE

## 2020-01-30 PROCEDURE — 80053 COMPREHEN METABOLIC PANEL: CPT

## 2020-01-30 PROCEDURE — 6370000000 HC RX 637 (ALT 250 FOR IP): Performed by: INTERNAL MEDICINE

## 2020-01-30 PROCEDURE — 85027 COMPLETE CBC AUTOMATED: CPT

## 2020-01-30 PROCEDURE — 2580000003 HC RX 258: Performed by: INTERNAL MEDICINE

## 2020-01-30 PROCEDURE — 36415 COLL VENOUS BLD VENIPUNCTURE: CPT

## 2020-01-30 RX ADMIN — FERROUS SULFATE TAB 325 MG (65 MG ELEMENTAL FE) 325 MG: 325 (65 FE) TAB at 08:30

## 2020-01-30 RX ADMIN — TAMSULOSIN HYDROCHLORIDE 0.4 MG: 0.4 CAPSULE ORAL at 08:30

## 2020-01-30 RX ADMIN — ENOXAPARIN SODIUM 80 MG: 80 INJECTION SUBCUTANEOUS at 13:32

## 2020-01-30 RX ADMIN — ATORVASTATIN CALCIUM 10 MG: 10 TABLET, FILM COATED ORAL at 08:30

## 2020-01-30 RX ADMIN — FINASTERIDE 5 MG: 5 TABLET, FILM COATED ORAL at 08:27

## 2020-01-30 RX ADMIN — ENOXAPARIN SODIUM 80 MG: 80 INJECTION SUBCUTANEOUS at 20:23

## 2020-01-30 RX ADMIN — GABAPENTIN 800 MG: 400 CAPSULE ORAL at 13:31

## 2020-01-30 RX ADMIN — SODIUM CHLORIDE, PRESERVATIVE FREE 10 ML: 5 INJECTION INTRAVENOUS at 08:49

## 2020-01-30 RX ADMIN — GABAPENTIN 800 MG: 400 CAPSULE ORAL at 20:23

## 2020-01-30 RX ADMIN — CETIRIZINE HYDROCHLORIDE 10 MG: 10 TABLET, FILM COATED ORAL at 08:30

## 2020-01-30 RX ADMIN — Medication 5000 UNITS: at 08:29

## 2020-01-30 RX ADMIN — GABAPENTIN 800 MG: 400 CAPSULE ORAL at 08:27

## 2020-01-30 RX ADMIN — AMLODIPINE BESYLATE 5 MG: 5 TABLET ORAL at 08:29

## 2020-01-30 ASSESSMENT — PAIN SCALES - GENERAL
PAINLEVEL_OUTOF10: 0
PAINLEVEL_OUTOF10: 0

## 2020-01-30 NOTE — CARE COORDINATION
Met c pt to initiate discharge planning. Pt lives at [address] alone, pt does not drive, relies on transport of [address] (pt hopeful to begin driving however/ has modified car). Pt has pcp/active insurance and can afford meds. Pt feels able to return to Assisted Living and plans to begin [facility]. Permission given to share PHI. Referral called to Ambar Odom with [facility] and info faxed to [phone]. Pt denied additional needs. Advised CM remains available if additional needs arise.      Discharging Nurse: Discharging Nurse: notify Ambar Odom with [facility] ([phone]) of pt's discharge and fax AVS and [facility] order to [phone]

## 2020-01-30 NOTE — PLAN OF CARE
Problem: Falls - Risk of:  Goal: Will remain free from falls  Description  Will remain free from falls  Outcome: Ongoing  Goal: Absence of physical injury  Description  Absence of physical injury  Outcome: Ongoing     Problem: Mobility - Impaired:  Goal: Mobility will improve  Description  Mobility will improve  Outcome: Ongoing     Problem: HEMODYNAMIC STATUS  Goal: Hemodynamic status to baseline/discharge criteria met  Outcome: Ongoing     Problem: SAFETY & PSYCHO SOCIAL  Goal: Patient returns to baseline activity/meets discharge criteria  Outcome: Ongoing     Problem: NUTRITION  Goal: Patient to baseline / improved nutrition  Outcome: Ongoing     Problem: LAB & DIAGNOSTICS  Goal: Additional tests per physician orders  Outcome: Ongoing     Problem: KNOWLEDGE DEFICIT,EDUCATION,DISCHARGE PLAN  Goal: Patient/S. O.verbalize understanding of procedure/DC instruct  Outcome: Ongoing

## 2020-01-31 ENCOUNTER — APPOINTMENT (OUTPATIENT)
Dept: MRI IMAGING | Age: 75
DRG: 446 | End: 2020-01-31
Payer: MEDICARE

## 2020-01-31 LAB
ALBUMIN SERPL-MCNC: 4 GM/DL (ref 3.4–5)
ALP BLD-CCNC: 185 IU/L (ref 40–129)
ALT SERPL-CCNC: 93 U/L (ref 10–40)
ANION GAP SERPL CALCULATED.3IONS-SCNC: 14 MMOL/L (ref 4–16)
AST SERPL-CCNC: 41 IU/L (ref 15–37)
BILIRUB SERPL-MCNC: 3 MG/DL (ref 0–1)
BUN BLDV-MCNC: 10 MG/DL (ref 6–23)
CALCIUM SERPL-MCNC: 9.1 MG/DL (ref 8.3–10.6)
CHLORIDE BLD-SCNC: 100 MMOL/L (ref 99–110)
CO2: 21 MMOL/L (ref 21–32)
CREAT SERPL-MCNC: 0.6 MG/DL (ref 0.9–1.3)
GFR AFRICAN AMERICAN: >60 ML/MIN/1.73M2
GFR NON-AFRICAN AMERICAN: >60 ML/MIN/1.73M2
GLUCOSE BLD-MCNC: 179 MG/DL (ref 70–99)
POTASSIUM SERPL-SCNC: 4.1 MMOL/L (ref 3.5–5.1)
SODIUM BLD-SCNC: 135 MMOL/L (ref 135–145)
TOTAL PROTEIN: 6.7 GM/DL (ref 6.4–8.2)

## 2020-01-31 PROCEDURE — 6370000000 HC RX 637 (ALT 250 FOR IP): Performed by: INTERNAL MEDICINE

## 2020-01-31 PROCEDURE — 80053 COMPREHEN METABOLIC PANEL: CPT

## 2020-01-31 PROCEDURE — 2580000003 HC RX 258: Performed by: INTERNAL MEDICINE

## 2020-01-31 PROCEDURE — 6360000002 HC RX W HCPCS: Performed by: NURSE PRACTITIONER

## 2020-01-31 PROCEDURE — 2580000003 HC RX 258: Performed by: NURSE PRACTITIONER

## 2020-01-31 PROCEDURE — 74181 MRI ABDOMEN W/O CONTRAST: CPT

## 2020-01-31 PROCEDURE — 1200000000 HC SEMI PRIVATE

## 2020-01-31 RX ADMIN — FERROUS SULFATE TAB 325 MG (65 MG ELEMENTAL FE) 325 MG: 325 (65 FE) TAB at 09:18

## 2020-01-31 RX ADMIN — HYDRALAZINE HYDROCHLORIDE 10 MG: 20 INJECTION INTRAMUSCULAR; INTRAVENOUS at 06:15

## 2020-01-31 RX ADMIN — CETIRIZINE HYDROCHLORIDE 10 MG: 10 TABLET, FILM COATED ORAL at 09:18

## 2020-01-31 RX ADMIN — GABAPENTIN 800 MG: 400 CAPSULE ORAL at 21:57

## 2020-01-31 RX ADMIN — FINASTERIDE 5 MG: 5 TABLET, FILM COATED ORAL at 09:18

## 2020-01-31 RX ADMIN — Medication 5000 UNITS: at 09:17

## 2020-01-31 RX ADMIN — AMLODIPINE BESYLATE 5 MG: 5 TABLET ORAL at 09:18

## 2020-01-31 RX ADMIN — ATORVASTATIN CALCIUM 10 MG: 10 TABLET, FILM COATED ORAL at 09:18

## 2020-01-31 RX ADMIN — TAMSULOSIN HYDROCHLORIDE 0.4 MG: 0.4 CAPSULE ORAL at 09:24

## 2020-01-31 RX ADMIN — SODIUM CHLORIDE, PRESERVATIVE FREE 10 ML: 5 INJECTION INTRAVENOUS at 21:57

## 2020-01-31 RX ADMIN — GABAPENTIN 800 MG: 400 CAPSULE ORAL at 13:33

## 2020-01-31 RX ADMIN — GABAPENTIN 800 MG: 400 CAPSULE ORAL at 09:24

## 2020-01-31 NOTE — PROGRESS NOTES
Hospitalist Progress Note      Name:  Magdy Wren /Age/Sex: 1945  (75 y.o. male)   MRN & CSN:  [id number] & [id number] Admission Date/Time: 2020  7:06 PM   Location:  [address] [place] [address] PCP: Marcia Tyson DO         Hospital Day: 4    Assessment and Plan:   Magdy Wren is a 75 y.o.  male  who presents with Jaundice    1. Jaundice, Likely Obstructive  · Has hyperbilirubinemia, ALP and ALT. resolving concurrently, nightly  · CT AP with cholelithiasis, diffuse hepatic steatosis. Moderate hiatal hernia  · MRCP pending. Hopefully to be done today or tomorrow. · GI on consult  2. History of PE: currently on Coumadin, now held for possible procedure. 3. Hypertension: Blood pressure controlled. Continue medications. 4. Hyperlipidemia: continue statin  5. S/P BKA, Right    Diet DIET LOW FAT;   DVT Prophylaxis [] Lovenox, []  Heparin, [x] SCDs, [] Warfarin  [] NOAC     GI Prophylaxis [] PPI,  [] H2 Blocker,  [] Carafate,  [x] Diet/Tube Feeds   Code Status DNR-CCA   MDM [] Low, [x] Moderate,[]  High     History of Present Illness:     Chief Complaint: Jaundice    Seen and examined. No abdominal pain. No fever or chills. No nausea or vomiting. Ten point ROS reviewed negative, unless as noted above    Objective: Intake/Output Summary (Last 24 hours) at 2020 1230  Last data filed at 2020 0909  Gross per 24 hour   Intake 990 ml   Output 1100 ml   Net -110 ml      Vitals:   Vitals:    20 1130   BP: 139/70   Pulse: 79   Resp: 16   Temp: 98.3 °F (36.8 °C)   SpO2:      Physical Exam:   GEN Awake male, sitting upright in bed in no apparent distress. Appears given age. jaundice  EYES Pupils are equally round. No scleral erythema, discharge, or conjunctivitis. HENT Mucous membranes are moist. Oral pharynx without exudates, no evidence of thrush. NECK Supple, no apparent thyromegaly or masses. RESP Clear to auscultation, no wheezes, rales or rhonchi.   Symmetric chest
LFT's improving  MRI machine still down  If unable to do MRCP tomorrow could discharge and do as outpatient
Pt allowed full liquid diet post mrcp. Pt was npo after midnight. Last known liquid before admission to floor before midnight.
Regular rate without appreciable murmurs, rubs, or gallops. No JVD or carotid bruits. Peripheral pulses equal bilaterally and palpable. No peripheral edema. GI Abdomen + tenderness on epigastric, -ve Valle's. + BS  MSK No gross joint deformities. SKIN Normal coloration, warm, dry. NEURO Cranial nerves appear grossly intact, normal speech, no lateralizing weakness. PSYCH Awake, alert, oriented x 4. Affect appropriate.     Medications:   Medications:    amLODIPine  5 mg Oral Daily    atorvastatin  10 mg Oral Daily    tamsulosin  0.4 mg Oral Daily    sodium chloride flush  10 mL Intravenous 2 times per day      Infusions:   PRN Meds: sodium chloride flush, 10 mL, PRN  magnesium hydroxide, 30 mL, Daily PRN  ondansetron, 4 mg, Q6H PRN  acetaminophen, 650 mg, Q4H PRN        Recent Labs     01/26/20 1918 01/28/20 1651 01/29/20  0204   WBC 6.0 5.3 5.4   HGB 12.7* 13.9 12.9*   HCT 39.9* 44.6 41.4*    194 187      Recent Labs     01/26/20 1918 01/28/20  1651 01/29/20  0204    137 135   K 4.0 3.9 3.7   CL 96* 100 103   CO2 25 23 21   BUN 15 10 10   CREATININE 0.8* 0.7* 0.6*     Recent Labs     01/26/20 1918 01/28/20  1651 01/29/20  0204   AST 73* 63* 44*   ALT 36 198* 158*   BILIDIR  --   --  5.7*   BILITOT 1.6* 10.0* 7.4*  7.4*   ALKPHOS 122 165* 148*     Recent Labs     01/26/20 1918 01/28/20  2212   INR 3.18 2.18     Recent Labs     01/26/20 1918 01/26/20  2200   TROPONINT <0.010 <0.010        Imaging reviewed      Electronically signed by Benji Hallman MD on 1/29/2020 at 3:02 PM
movement while on room air. CARDIO/VASC S1/S2 auscultated. Regular rate without appreciable murmurs, rubs, or gallops. No JVD or carotid bruits. Peripheral pulses equal bilaterally and palpable. No peripheral edema. GI Abdomen + tenderness on epigastric, -ve Valle's. + BS  MSK No gross joint deformities. SKIN Normal coloration, warm, dry. NEURO Cranial nerves appear grossly intact, normal speech, no lateralizing weakness. PSYCH Awake, alert, oriented x 4. Affect appropriate. Medications:   Medications:    vitamin D  5,000 Units Oral Daily    ferrous sulfate  325 mg Oral Daily with breakfast    finasteride  5 mg Oral Daily    gabapentin  800 mg Oral TID    cetirizine  10 mg Oral Daily    amLODIPine  5 mg Oral Daily    atorvastatin  10 mg Oral Daily    tamsulosin  0.4 mg Oral Daily    sodium chloride flush  10 mL Intravenous 2 times per day      Infusions:   PRN Meds: cyclobenzaprine, 10 mg, TID PRN  sodium chloride flush, 10 mL, PRN  magnesium hydroxide, 30 mL, Daily PRN  ondansetron, 4 mg, Q6H PRN  acetaminophen, 650 mg, Q4H PRN        Recent Labs     01/28/20  1651 01/29/20  0204 01/30/20  0333   WBC 5.3 5.4 5.0   HGB 13.9 12.9* 13.8   HCT 44.6 41.4* 43.2    187 224      Recent Labs     01/28/20  1651 01/29/20  0204 01/30/20  0333    135 139   K 3.9 3.7 3.8    103 103   CO2 23 21 23   BUN 10 10 12   CREATININE 0.7* 0.6* 0.6*     Recent Labs     01/28/20  1651 01/29/20  0204 01/30/20  0333   AST 63* 44* 29   * 158* 118*   BILIDIR  --  5.7*  --    BILITOT 10.0* 7.4*  7.4* 4.4*   ALKPHOS 165* 148* 181*     Recent Labs     01/28/20  2212   INR 2.18     No results for input(s): CKTOTAL, CKMB, CKMBINDEX, TROPONINT in the last 72 hours.      Imaging reviewed      Electronically signed by David Brown MD on 1/30/2020 at 12:31 PM

## 2020-02-01 VITALS
OXYGEN SATURATION: 89 % | HEIGHT: 70 IN | TEMPERATURE: 97.4 F | SYSTOLIC BLOOD PRESSURE: 141 MMHG | HEART RATE: 77 BPM | WEIGHT: 178 LBS | BODY MASS INDEX: 25.48 KG/M2 | DIASTOLIC BLOOD PRESSURE: 68 MMHG | RESPIRATION RATE: 19 BRPM

## 2020-02-01 LAB
ALBUMIN SERPL-MCNC: 4 GM/DL (ref 3.4–5)
ALP BLD-CCNC: 176 IU/L (ref 40–128)
ALT SERPL-CCNC: 84 U/L (ref 10–40)
ANION GAP SERPL CALCULATED.3IONS-SCNC: 14 MMOL/L (ref 4–16)
AST SERPL-CCNC: 35 IU/L (ref 15–37)
BILIRUB SERPL-MCNC: 2.7 MG/DL (ref 0–1)
BUN BLDV-MCNC: 8 MG/DL (ref 6–23)
CALCIUM SERPL-MCNC: 9.1 MG/DL (ref 8.3–10.6)
CHLORIDE BLD-SCNC: 99 MMOL/L (ref 99–110)
CO2: 24 MMOL/L (ref 21–32)
CREAT SERPL-MCNC: 0.6 MG/DL (ref 0.9–1.3)
GFR AFRICAN AMERICAN: >60 ML/MIN/1.73M2
GFR NON-AFRICAN AMERICAN: >60 ML/MIN/1.73M2
GLUCOSE BLD-MCNC: 118 MG/DL (ref 70–99)
INR BLD: 1.17 INDEX
POTASSIUM SERPL-SCNC: 4.5 MMOL/L (ref 3.5–5.1)
PROTHROMBIN TIME: 14.2 SECONDS (ref 11.7–14.5)
SODIUM BLD-SCNC: 137 MMOL/L (ref 135–145)
TOTAL PROTEIN: 6.8 GM/DL (ref 6.4–8.2)

## 2020-02-01 PROCEDURE — 80053 COMPREHEN METABOLIC PANEL: CPT

## 2020-02-01 PROCEDURE — 36415 COLL VENOUS BLD VENIPUNCTURE: CPT

## 2020-02-01 PROCEDURE — 94761 N-INVAS EAR/PLS OXIMETRY MLT: CPT

## 2020-02-01 PROCEDURE — 2580000003 HC RX 258: Performed by: INTERNAL MEDICINE

## 2020-02-01 PROCEDURE — 85610 PROTHROMBIN TIME: CPT

## 2020-02-01 PROCEDURE — 6370000000 HC RX 637 (ALT 250 FOR IP): Performed by: INTERNAL MEDICINE

## 2020-02-01 RX ADMIN — GABAPENTIN 800 MG: 400 CAPSULE ORAL at 09:03

## 2020-02-01 RX ADMIN — FERROUS SULFATE TAB 325 MG (65 MG ELEMENTAL FE) 325 MG: 325 (65 FE) TAB at 09:06

## 2020-02-01 RX ADMIN — Medication 5000 UNITS: at 10:00

## 2020-02-01 RX ADMIN — GABAPENTIN 800 MG: 400 CAPSULE ORAL at 13:51

## 2020-02-01 RX ADMIN — SODIUM CHLORIDE, PRESERVATIVE FREE 10 ML: 5 INJECTION INTRAVENOUS at 09:19

## 2020-02-01 RX ADMIN — TAMSULOSIN HYDROCHLORIDE 0.4 MG: 0.4 CAPSULE ORAL at 09:05

## 2020-02-01 RX ADMIN — ATORVASTATIN CALCIUM 10 MG: 10 TABLET, FILM COATED ORAL at 09:05

## 2020-02-01 RX ADMIN — CETIRIZINE HYDROCHLORIDE 10 MG: 10 TABLET, FILM COATED ORAL at 09:06

## 2020-02-01 RX ADMIN — AMLODIPINE BESYLATE 5 MG: 5 TABLET ORAL at 09:16

## 2020-02-01 RX ADMIN — FINASTERIDE 5 MG: 5 TABLET, FILM COATED ORAL at 09:07

## 2020-02-01 ASSESSMENT — PAIN SCALES - GENERAL
PAINLEVEL_OUTOF10: 0

## 2020-02-01 NOTE — DISCHARGE INSTR - DIET

## 2020-02-01 NOTE — DISCHARGE SUMMARY
TABS  Take 5,000 Units by mouth daily             cyclobenzaprine (FLEXERIL) 10 MG tablet  Take 1 tablet by mouth 3 times daily as needed for Muscle spasms             ferrous sulfate 325 (65 Fe) MG EC tablet  Take 325 mg by mouth daily (with breakfast)             finasteride (PROSCAR) 5 MG tablet  Take 5 mg by mouth daily             gabapentin (NEURONTIN) 400 MG capsule  Take 800 mg by mouth 3 times daily. guaiFENesin (MUCINEX) 600 MG extended release tablet  Take 1,200 mg by mouth 2 times daily             loratadine (CLARITIN) 10 MG capsule  Take 10 mg by mouth daily             magnesium hydroxide (MILK OF MAGNESIA) 400 MG/5ML suspension  Take 30 mLs by mouth daily as needed for Constipation             meclizine (ANTIVERT) 12.5 MG tablet  Take 12.5 mg by mouth 2 times daily             omeprazole (PRILOSEC) 20 MG delayed release capsule  Take 20 mg by mouth Daily             polyethylene glycol (GLYCOLAX) packet  Take 17 g by mouth daily as needed for Constipation             Sodium Bicarbonate-Citric Acid (LEODAN-SELTZER HEARTBURN PO)  Take 2 tablets by mouth daily as needed             tamsulosin (FLOMAX) 0.4 MG capsule  Take 0.4 mg by mouth daily             terbinafine (LAMISIL) 1 % cream  Apply topically 2 times daily Apply topically 2 times daily. warfarin (COUMADIN) 2.5 MG tablet  Take 6.5 mg by mouth daily                 Objective Findings at Discharge:   BP (!) 141/68   Pulse 77   Temp 97.4 °F (36.3 °C) (Oral)   Resp 16   Ht 5' 10\" (1.778 m)   Wt 178 lb (80.7 kg)   SpO2 (!) 89%   BMI 25.54 kg/m²            GEN    Awake male, sitting upright in bed in no apparent distress. Appears given age. jaundice  EYES   Pupils are equally round. No scleral erythema, discharge, or conjunctivitis. HENT  Mucous membranes are moist. Oral pharynx without exudates, no evidence of thrush. NECK  Supple, no apparent thyromegaly or masses.   RESP  Clear to auscultation, no wheezes, rales or rhonchi. Symmetric chest movement while on room air. CARDIO/VASC           S1/S2 auscultated. Regular rate without appreciable murmurs, rubs, or gallops. No JVD or carotid bruits. Peripheral pulses equal bilaterally and palpable. No peripheral edema. GI        Abdomen + tenderness on epigastric, -ve Valle's. + BS  MSK    No gross joint deformities. SKIN    Normal coloration, warm, dry. NEURO           Cranial nerves appear grossly intact, normal speech, no lateralizing weakness. PSYCH            Awake, alert, oriented x 4. Affect appropriate. BMP/CBC  Recent Labs     01/30/20  0333 01/31/20  1144 02/01/20  0618    135 137   K 3.8 4.1 4.5    100 99   CO2 23 21 24   BUN 12 10 8   CREATININE 0.6* 0.6* 0.6*   WBC 5.0  --   --    HCT 43.2  --   --      --   --        IMAGING:  Ct Abdomen Pelvis W Iv Contrast    Result Date: 1/28/2020  EXAMINATION: CT OF THE ABDOMEN AND PELVIS WITH CONTRAST 1/28/2020 8:28 pm TECHNIQUE: CT of the abdomen and pelvis was performed with the administration of intravenous contrast. Multiplanar reformatted images are provided for review. Dose modulation, iterative reconstruction, and/or weight based adjustment of the mA/kV was utilized to reduce the radiation dose to as low as reasonably achievable. COMPARISON: None. HISTORY: ORDERING SYSTEM PROVIDED HISTORY: painless jaundice TECHNOLOGIST PROVIDED HISTORY: IV contrast only. Thank you. Reason for exam:->painless jaundice Reason for exam:->IV contrast only. Thank you. Reason for Exam:  presents for jaundice since yesterday. States blood work demonstrated elevated LFTs. Had some CP and back pain 2 days ago but none now. Acuity: Acute Type of Exam: Initial Additional signs and symptoms: 75 cc isovue 370 Relevant Medical/Surgical History: hx appy FINDINGS: Lower Chest: Minimal bibasilar axis. Moderate hiatal hernia. Scattered coronary artery calcifications.  Organs: Mild diffuse hepatic hypoattenuation without focal MRCP 1/31/2020 2:05 pm TECHNIQUE: Multiplanar multisequence MRI of the abdomen was performed without the administration of intravenous contrast.  After initial T2 axial and coronal images, thick slab, thin slab and 3D coronal MRCP sequences were obtained without the administration of intravenous contrast.  MIP images are provided for review. COMPARISON: 01/28/2020 CT HISTORY: ORDERING SYSTEM PROVIDED HISTORY: elevated Tbili TECHNOLOGIST PROVIDED HISTORY: Reason for exam:->elevated Tbili FINDINGS: Gallbladder: Several filling defects are observed in the lumen of the gallbladder extending to the gallbladder neck. Pattern evidence of underlying cholelithiasis. No mucosal thickening or pericholecystic fluid on the current study. Bile Ducts: The intrahepatic biliary ducts are normal.  Common hepatic and cystic ducts are normal.  The common bile duct is normal with no intraluminal filling defect or stricture. Diameter measures up to 6 mm. Pancreatic Duct: The pancreatic duct is normal. Other:  No significant hepatic steatosis on opposed phase imaging. No hepatic lesions are detected. There is a single cyst in the right kidney measuring 7 mm. Fat atrophy of the pancreas is demonstrated. A moderate-sized hiatal hernia is noted in the lower chest.     1. Cholelithiasis with no evidence of acute cholecystitis. 2. Otherwise normal MRCP. Us Abdomen Limited    Result Date: 1/28/2020  EXAMINATION: RIGHT UPPER QUADRANT ULTRASOUND 1/28/2020 4:30 pm COMPARISON: None. HISTORY: ORDERING SYSTEM PROVIDED HISTORY: RUQ pain TECHNOLOGIST PROVIDED HISTORY: Reason for exam:->RUQ pain Reason for Exam: abnormal labs, ruq pain Acuity: Unknown Type of Exam: Initial FINDINGS: LIVER:  The liver demonstrates increased echogenicity without evidence of intrahepatic biliary ductal dilatation. BILIARY SYSTEM:  Gallstone within the gallbladder. No gallbladder wall thickening or pericholecystic fluid. Sonographic Faith Callahan sign is absent. Common bile duct is within normal limits measuring 3 mm. RIGHT KIDNEY: The right kidney is grossly unremarkable without evidence of hydronephrosis. PANCREAS:  Visualized portions of the pancreas are unremarkable. OTHER: No evidence of right upper quadrant ascites. 1.  Cholelithiasis without evidence of acute cholecystitis. 2.  Fatty/fibrotic infiltration of the liver.        Discharge Time of 20 minutes    Electronically signed by Mariluz Camargo MD on 2/1/2020 at 12:35 PM

## 2020-02-01 NOTE — DISCHARGE INSTR - COC
Continuity of Care Form    Patient Name: Logan Sykes   :  1945  MRN:  [id number]    Admit date:  2020  Discharge date:  2020    Code Status Order: DNR-CCA   Advance Directives:   [address] Directive Type of Healthcare Directive Copy in [address]  [address] Agent's Name Healthcare Agent's Phone Number    [phone]  Unknown, patient unable to respond due to medical condition -- -- -- -- --          Admitting Physician:  Tom Resendiz MD  PCP: Tor Saldivar DO    Discharging Nurse: [facility] Unit/Room#: 4104/4104-A  Discharging Unit Phone Number: [phone]    Emergency Contact:   Extended Emergency Contact Information  Primary Emergency Contact: Viri Gardner, [address] Phone: [phone]  Mobile Phone: [phone]  Relation: Other    Past Surgical History:  Past Surgical History:   Procedure Laterality Date    ABOVE KNEE AMPUTATION Right     APPENDECTOMY      TONSILLECTOMY         Immunization History: There is no immunization history on file for this patient.     Active Problems:  Patient Active Problem List   Diagnosis Code    Jaundice R17       Isolation/Infection:   Isolation          No Isolation        Patient Infection Status     None to display          Nurse Assessment:  Last Vital Signs: BP (!) 141/68   Pulse 77   Temp 97.4 °F (36.3 °C) (Oral)   Resp 19   Ht 5' 10\" (1.778 m)   Wt 178 lb (80.7 kg)   SpO2 (!) 89%   BMI 25.54 kg/m²     Last documented pain score (0-10 scale): Pain Level: 0  Last Weight:   Wt Readings from Last 1 Encounters:   20 178 lb (80.7 kg)     Mental Status:  oriented, alert, coherent, thought processes intact and able to concentrate and follow conversation    IV Access:  - None    Nursing Mobility/ADLs:  Walking   Assisted  Transfer  Assisted  Bathing  Assisted  Dressing  Assisted  [address] Assisted  Med Delivery   none    Wound Care Documentation and Therapy:        Elimination:  Continence:   · Bowel: Yes  · Bladder: Yes  Urinary Catheter: None   Colostomy/Ileostomy/Ileal Conduit: No       Date of Last BM: 2/1/2020    Intake/Output Summary (Last 24 hours) at 2/1/2020 1456  Last data filed at 2/1/2020 0204  Gross per 24 hour   Intake 240 ml   Output 400 ml   Net -160 ml     I/O last 3 completed shifts: In: 46 [P.O.:390]  Out: 400 [Urine:400]    Safety Concerns: At Risk for Falls    Impairments/Disabilities:      Hearing    Nutrition Therapy:  Current Nutrition Therapy:   - Oral Diet:  [address] MARITZA Oral AYOV:[id number]}    Routes of Feeding: Oral  Liquids: Thin Liquids  Daily Fluid Restriction: no  Last Modified Barium Swallow with Video (Video Swallowing Test): not done    Treatments at the Time of Hospital Discharge:   Respiratory Treatments: ***  Oxygen Therapy:  is not on home oxygen therapy. Ventilator:    - No ventilator support    Rehab Therapies: Physical Therapy and Occupational Therapy  Weight Bearing Status/Restrictions: Partial weight bearing (30-50%) only on leg right leg  Other Medical Equipment (for information only, NOT a DME order):  wheelchair and walker  Other Treatments: ***    Patient's personal belongings (please select all that are sent with patient):   Fam Reis, Cell phone     RN SIGNATURE:  {Esignature:[id number]}    CASE MANAGEMENT/SOCIAL WORK SECTION    Inpatient Status Date: 1/28/2020    Readmission Risk Assessment Score:  Readmission Risk              Risk of Unplanned Readmission:        9           Discharging to Facility/ Agency   · Name:   · Address:  · Phone:  · Fax:    Dialysis Facility (if applicable)   · Name:  · Address:  · Dialysis Schedule:  · Phone:  · Fax:    / signature: {Esignature:[id number]}    PHYSICIAN SECTION    Prognosis: {Prognosis:[id number]}    Condition at Discharge: [address] Patient

## 2020-02-02 PROBLEM — R74.8 ELEVATED LIVER ENZYMES: Status: ACTIVE | Noted: 2020-02-02

## 2020-02-03 LAB
EKG ATRIAL RATE: 71 BPM
EKG DIAGNOSIS: NORMAL
EKG P-R INTERVAL: 192 MS
EKG Q-T INTERVAL: 402 MS
EKG QRS DURATION: 92 MS
EKG QTC CALCULATION (BAZETT): 436 MS
EKG R AXIS: -39 DEGREES
EKG T AXIS: 56 DEGREES
EKG VENTRICULAR RATE: 71 BPM

## 2020-04-27 ENCOUNTER — HOSPITAL ENCOUNTER (INPATIENT)
Age: 75
LOS: 8 days | Discharge: NURSING, REHAB OR LONG-TERM CARE | DRG: 417 | End: 2020-05-06
Attending: INTERNAL MEDICINE | Admitting: INTERNAL MEDICINE
Payer: MEDICARE

## 2020-04-27 ENCOUNTER — APPOINTMENT (OUTPATIENT)
Dept: CT IMAGING | Age: 75
DRG: 417 | End: 2020-04-27
Payer: MEDICARE

## 2020-04-27 LAB
ALBUMIN SERPL-MCNC: 3.9 GM/DL (ref 3.4–5)
ALP BLD-CCNC: 100 IU/L (ref 40–129)
ALT SERPL-CCNC: 91 U/L (ref 10–40)
ANION GAP SERPL CALCULATED.3IONS-SCNC: 13 MMOL/L (ref 4–16)
AST SERPL-CCNC: 28 IU/L (ref 15–37)
BASOPHILS ABSOLUTE: 0 K/CU MM
BASOPHILS RELATIVE PERCENT: 0.6 % (ref 0–1)
BILIRUB SERPL-MCNC: 3.4 MG/DL (ref 0–1)
BILIRUB SERPL-MCNC: 3.4 MG/DL (ref 0–1)
BILIRUBIN DIRECT: 2 MG/DL (ref 0–0.3)
BILIRUBIN, INDIRECT: 1.4 MG/DL (ref 0–0.7)
BUN BLDV-MCNC: 10 MG/DL (ref 6–23)
CALCIUM SERPL-MCNC: 8.9 MG/DL (ref 8.3–10.6)
CHLORIDE BLD-SCNC: 104 MMOL/L (ref 99–110)
CO2: 25 MMOL/L (ref 21–32)
CREAT SERPL-MCNC: 0.6 MG/DL (ref 0.9–1.3)
DIFFERENTIAL TYPE: ABNORMAL
EOSINOPHILS ABSOLUTE: 0.1 K/CU MM
EOSINOPHILS RELATIVE PERCENT: 2.8 % (ref 0–3)
GFR AFRICAN AMERICAN: >60 ML/MIN/1.73M2
GFR NON-AFRICAN AMERICAN: >60 ML/MIN/1.73M2
GLUCOSE BLD-MCNC: 153 MG/DL (ref 70–99)
HCT VFR BLD CALC: 38.9 % (ref 42–52)
HEMOGLOBIN: 12.7 GM/DL (ref 13.5–18)
IMMATURE NEUTROPHIL %: 0.6 % (ref 0–0.43)
INR BLD: 1.86 INDEX
LIPASE: 13 IU/L (ref 13–60)
LYMPHOCYTES ABSOLUTE: 1.5 K/CU MM
LYMPHOCYTES RELATIVE PERCENT: 31.6 % (ref 24–44)
MCH RBC QN AUTO: 31.9 PG (ref 27–31)
MCHC RBC AUTO-ENTMCNC: 32.6 % (ref 32–36)
MCV RBC AUTO: 97.7 FL (ref 78–100)
MONOCYTES ABSOLUTE: 0.3 K/CU MM
MONOCYTES RELATIVE PERCENT: 6.6 % (ref 0–4)
NUCLEATED RBC %: 0 %
PDW BLD-RTO: 13.2 % (ref 11.7–14.9)
PLATELET # BLD: 188 K/CU MM (ref 140–440)
PMV BLD AUTO: 10.5 FL (ref 7.5–11.1)
POTASSIUM SERPL-SCNC: 4.3 MMOL/L (ref 3.5–5.1)
PROTHROMBIN TIME: 22.7 SECONDS (ref 11.7–14.5)
RBC # BLD: 3.98 M/CU MM (ref 4.6–6.2)
SEGMENTED NEUTROPHILS ABSOLUTE COUNT: 2.7 K/CU MM
SEGMENTED NEUTROPHILS RELATIVE PERCENT: 57.8 % (ref 36–66)
SODIUM BLD-SCNC: 142 MMOL/L (ref 135–145)
TOTAL IMMATURE NEUTOROPHIL: 0.03 K/CU MM
TOTAL NUCLEATED RBC: 0 K/CU MM
TOTAL PROTEIN: 7.1 GM/DL (ref 6.4–8.2)
WBC # BLD: 4.7 K/CU MM (ref 4–10.5)

## 2020-04-27 PROCEDURE — 74177 CT ABD & PELVIS W/CONTRAST: CPT

## 2020-04-27 PROCEDURE — 85025 COMPLETE CBC W/AUTO DIFF WBC: CPT

## 2020-04-27 PROCEDURE — 99285 EMERGENCY DEPT VISIT HI MDM: CPT

## 2020-04-27 PROCEDURE — 2580000003 HC RX 258: Performed by: PHYSICIAN ASSISTANT

## 2020-04-27 PROCEDURE — 36415 COLL VENOUS BLD VENIPUNCTURE: CPT

## 2020-04-27 PROCEDURE — 80053 COMPREHEN METABOLIC PANEL: CPT

## 2020-04-27 PROCEDURE — 83690 ASSAY OF LIPASE: CPT

## 2020-04-27 PROCEDURE — 82248 BILIRUBIN DIRECT: CPT

## 2020-04-27 PROCEDURE — 81001 URINALYSIS AUTO W/SCOPE: CPT

## 2020-04-27 PROCEDURE — 85610 PROTHROMBIN TIME: CPT

## 2020-04-27 PROCEDURE — 6360000004 HC RX CONTRAST MEDICATION: Performed by: PHYSICIAN ASSISTANT

## 2020-04-27 RX ORDER — SODIUM CHLORIDE 0.9 % (FLUSH) 0.9 %
10 SYRINGE (ML) INJECTION
Status: COMPLETED | OUTPATIENT
Start: 2020-04-27 | End: 2020-04-27

## 2020-04-27 RX ADMIN — SODIUM CHLORIDE, PRESERVATIVE FREE 10 ML: 5 INJECTION INTRAVENOUS at 21:12

## 2020-04-27 RX ADMIN — IOPAMIDOL 75 ML: 755 INJECTION, SOLUTION INTRAVENOUS at 21:12

## 2020-04-27 NOTE — ED NOTES
Pt informed urine specimen is needed when able to provide specimen. Urinal provided. Pt voiced understanding.      Sary De Souza RN  [facility]

## 2020-04-28 ENCOUNTER — APPOINTMENT (OUTPATIENT)
Dept: MRI IMAGING | Age: 75
DRG: 417 | End: 2020-04-28
Payer: MEDICARE

## 2020-04-28 PROCEDURE — 2580000003 HC RX 258: Performed by: INTERNAL MEDICINE

## 2020-04-28 PROCEDURE — 6370000000 HC RX 637 (ALT 250 FOR IP): Performed by: INTERNAL MEDICINE

## 2020-04-28 PROCEDURE — 85610 PROTHROMBIN TIME: CPT

## 2020-04-28 PROCEDURE — 82728 ASSAY OF FERRITIN: CPT

## 2020-04-28 PROCEDURE — 74181 MRI ABDOMEN W/O CONTRAST: CPT

## 2020-04-28 PROCEDURE — 83516 IMMUNOASSAY NONANTIBODY: CPT

## 2020-04-28 PROCEDURE — 87340 HEPATITIS B SURFACE AG IA: CPT

## 2020-04-28 PROCEDURE — 86704 HEP B CORE ANTIBODY TOTAL: CPT

## 2020-04-28 PROCEDURE — 84132 ASSAY OF SERUM POTASSIUM: CPT

## 2020-04-28 PROCEDURE — 86039 ANTINUCLEAR ANTIBODIES (ANA): CPT

## 2020-04-28 PROCEDURE — 86803 HEPATITIS C AB TEST: CPT

## 2020-04-28 PROCEDURE — 86706 HEP B SURFACE ANTIBODY: CPT

## 2020-04-28 PROCEDURE — 1200000000 HC SEMI PRIVATE

## 2020-04-28 PROCEDURE — 83540 ASSAY OF IRON: CPT

## 2020-04-28 PROCEDURE — 80053 COMPREHEN METABOLIC PANEL: CPT

## 2020-04-28 PROCEDURE — 94761 N-INVAS EAR/PLS OXIMETRY MLT: CPT

## 2020-04-28 PROCEDURE — 83550 IRON BINDING TEST: CPT

## 2020-04-28 PROCEDURE — 82103 ALPHA-1-ANTITRYPSIN TOTAL: CPT

## 2020-04-28 PROCEDURE — 83735 ASSAY OF MAGNESIUM: CPT

## 2020-04-28 PROCEDURE — 83690 ASSAY OF LIPASE: CPT

## 2020-04-28 PROCEDURE — 86708 HEPATITIS A ANTIBODY: CPT

## 2020-04-28 PROCEDURE — 86038 ANTINUCLEAR ANTIBODIES: CPT

## 2020-04-28 PROCEDURE — 84165 PROTEIN E-PHORESIS SERUM: CPT

## 2020-04-28 PROCEDURE — 86256 FLUORESCENT ANTIBODY TITER: CPT

## 2020-04-28 RX ORDER — AMITRIPTYLINE HYDROCHLORIDE 25 MG/1
25 TABLET, FILM COATED ORAL NIGHTLY
Status: DISCONTINUED | OUTPATIENT
Start: 2020-04-28 | End: 2020-05-06 | Stop reason: HOSPADM

## 2020-04-28 RX ORDER — ATORVASTATIN CALCIUM 10 MG/1
10 TABLET, FILM COATED ORAL DAILY
Status: DISCONTINUED | OUTPATIENT
Start: 2020-04-28 | End: 2020-05-06 | Stop reason: HOSPADM

## 2020-04-28 RX ORDER — AMLODIPINE BESYLATE 5 MG/1
5 TABLET ORAL DAILY
Status: DISCONTINUED | OUTPATIENT
Start: 2020-04-28 | End: 2020-05-06 | Stop reason: HOSPADM

## 2020-04-28 RX ORDER — GUAIFENESIN 600 MG/1
1200 TABLET, EXTENDED RELEASE ORAL 2 TIMES DAILY
Status: DISCONTINUED | OUTPATIENT
Start: 2020-04-28 | End: 2020-05-06 | Stop reason: HOSPADM

## 2020-04-28 RX ORDER — SODIUM CHLORIDE 0.9 % (FLUSH) 0.9 %
10 SYRINGE (ML) INJECTION PRN
Status: DISCONTINUED | OUTPATIENT
Start: 2020-04-28 | End: 2020-05-06 | Stop reason: HOSPADM

## 2020-04-28 RX ORDER — POLYETHYLENE GLYCOL 3350 17 G/17G
17 POWDER, FOR SOLUTION ORAL DAILY PRN
Status: DISCONTINUED | OUTPATIENT
Start: 2020-04-28 | End: 2020-05-06 | Stop reason: HOSPADM

## 2020-04-28 RX ORDER — SODIUM CHLORIDE 0.9 % (FLUSH) 0.9 %
10 SYRINGE (ML) INJECTION 2 TIMES DAILY
Status: DISCONTINUED | OUTPATIENT
Start: 2020-04-28 | End: 2020-04-28

## 2020-04-28 RX ORDER — FERROUS SULFATE 325(65) MG
325 TABLET ORAL
Status: DISCONTINUED | OUTPATIENT
Start: 2020-04-28 | End: 2020-05-06 | Stop reason: HOSPADM

## 2020-04-28 RX ORDER — ACETAMINOPHEN 650 MG/1
650 SUPPOSITORY RECTAL EVERY 6 HOURS PRN
Status: DISCONTINUED | OUTPATIENT
Start: 2020-04-28 | End: 2020-05-06 | Stop reason: HOSPADM

## 2020-04-28 RX ORDER — PANTOPRAZOLE SODIUM 40 MG/1
40 TABLET, DELAYED RELEASE ORAL
Status: DISCONTINUED | OUTPATIENT
Start: 2020-04-28 | End: 2020-05-06 | Stop reason: HOSPADM

## 2020-04-28 RX ORDER — TAMSULOSIN HYDROCHLORIDE 0.4 MG/1
0.4 CAPSULE ORAL DAILY
Status: DISCONTINUED | OUTPATIENT
Start: 2020-04-28 | End: 2020-05-06 | Stop reason: HOSPADM

## 2020-04-28 RX ORDER — ONDANSETRON 2 MG/ML
4 INJECTION INTRAMUSCULAR; INTRAVENOUS EVERY 6 HOURS PRN
Status: DISCONTINUED | OUTPATIENT
Start: 2020-04-28 | End: 2020-05-06 | Stop reason: HOSPADM

## 2020-04-28 RX ORDER — CELECOXIB 200 MG/1
200 CAPSULE ORAL DAILY
Status: DISCONTINUED | OUTPATIENT
Start: 2020-04-28 | End: 2020-04-30

## 2020-04-28 RX ORDER — FINASTERIDE 5 MG/1
5 TABLET, FILM COATED ORAL DAILY
Status: DISCONTINUED | OUTPATIENT
Start: 2020-04-28 | End: 2020-05-06 | Stop reason: HOSPADM

## 2020-04-28 RX ORDER — CETIRIZINE HYDROCHLORIDE 10 MG/1
10 TABLET ORAL DAILY
Status: DISCONTINUED | OUTPATIENT
Start: 2020-04-28 | End: 2020-05-06 | Stop reason: HOSPADM

## 2020-04-28 RX ORDER — ACETAMINOPHEN 325 MG/1
650 TABLET ORAL EVERY 6 HOURS PRN
Status: DISCONTINUED | OUTPATIENT
Start: 2020-04-28 | End: 2020-05-06 | Stop reason: HOSPADM

## 2020-04-28 RX ORDER — MECLIZINE HCL 12.5 MG/1
12.5 TABLET ORAL 2 TIMES DAILY
Status: DISCONTINUED | OUTPATIENT
Start: 2020-04-28 | End: 2020-05-06 | Stop reason: HOSPADM

## 2020-04-28 RX ORDER — GABAPENTIN 400 MG/1
800 CAPSULE ORAL 3 TIMES DAILY
Status: DISCONTINUED | OUTPATIENT
Start: 2020-04-28 | End: 2020-05-06 | Stop reason: HOSPADM

## 2020-04-28 RX ORDER — PROMETHAZINE HYDROCHLORIDE 12.5 MG/1
12.5 TABLET ORAL EVERY 6 HOURS PRN
Status: DISCONTINUED | OUTPATIENT
Start: 2020-04-28 | End: 2020-05-06 | Stop reason: HOSPADM

## 2020-04-28 RX ORDER — SODIUM CHLORIDE 9 MG/ML
INJECTION, SOLUTION INTRAVENOUS CONTINUOUS
Status: DISCONTINUED | OUTPATIENT
Start: 2020-04-28 | End: 2020-04-29

## 2020-04-28 RX ORDER — SODIUM CHLORIDE 0.9 % (FLUSH) 0.9 %
10 SYRINGE (ML) INJECTION EVERY 12 HOURS SCHEDULED
Status: DISCONTINUED | OUTPATIENT
Start: 2020-04-28 | End: 2020-05-06 | Stop reason: HOSPADM

## 2020-04-28 RX ADMIN — WARFARIN SODIUM 6.5 MG: 2.5 TABLET ORAL at 18:57

## 2020-04-28 RX ADMIN — FINASTERIDE 5 MG: 5 TABLET, FILM COATED ORAL at 15:29

## 2020-04-28 RX ADMIN — CELECOXIB 200 MG: 200 CAPSULE ORAL at 15:32

## 2020-04-28 RX ADMIN — AMLODIPINE BESYLATE 5 MG: 5 TABLET ORAL at 15:30

## 2020-04-28 RX ADMIN — GUAIFENESIN 1200 MG: 600 TABLET, EXTENDED RELEASE ORAL at 21:20

## 2020-04-28 RX ADMIN — GABAPENTIN 800 MG: 400 CAPSULE ORAL at 15:29

## 2020-04-28 RX ADMIN — AMITRIPTYLINE HYDROCHLORIDE 25 MG: 25 TABLET, FILM COATED ORAL at 03:40

## 2020-04-28 RX ADMIN — TAMSULOSIN HYDROCHLORIDE 0.4 MG: 0.4 CAPSULE ORAL at 15:30

## 2020-04-28 RX ADMIN — SODIUM CHLORIDE, PRESERVATIVE FREE 10 ML: 5 INJECTION INTRAVENOUS at 02:29

## 2020-04-28 RX ADMIN — ATORVASTATIN CALCIUM 10 MG: 10 TABLET, FILM COATED ORAL at 15:30

## 2020-04-28 RX ADMIN — Medication 5000 UNITS: at 15:31

## 2020-04-28 RX ADMIN — AMITRIPTYLINE HYDROCHLORIDE 25 MG: 25 TABLET, FILM COATED ORAL at 21:20

## 2020-04-28 RX ADMIN — SODIUM CHLORIDE: 9 INJECTION, SOLUTION INTRAVENOUS at 21:41

## 2020-04-28 RX ADMIN — FERROUS SULFATE TAB 325 MG (65 MG ELEMENTAL FE) 325 MG: 325 (65 FE) TAB at 15:29

## 2020-04-28 RX ADMIN — MECLIZINE 12.5 MG: 12.5 TABLET ORAL at 21:40

## 2020-04-28 RX ADMIN — GABAPENTIN 800 MG: 400 CAPSULE ORAL at 21:20

## 2020-04-28 RX ADMIN — SODIUM CHLORIDE: 9 INJECTION, SOLUTION INTRAVENOUS at 02:29

## 2020-04-28 RX ADMIN — MECLIZINE 12.5 MG: 12.5 TABLET ORAL at 03:40

## 2020-04-28 RX ADMIN — GUAIFENESIN 1200 MG: 600 TABLET, EXTENDED RELEASE ORAL at 03:40

## 2020-04-28 RX ADMIN — PANTOPRAZOLE SODIUM 40 MG: 40 TABLET, DELAYED RELEASE ORAL at 06:41

## 2020-04-28 RX ADMIN — BISACODYL 10 MG: 5 TABLET, COATED ORAL at 15:30

## 2020-04-28 RX ADMIN — CETIRIZINE HYDROCHLORIDE 10 MG: 10 TABLET, FILM COATED ORAL at 15:30

## 2020-04-28 ASSESSMENT — PAIN SCALES - GENERAL
PAINLEVEL_OUTOF10: 0

## 2020-04-28 NOTE — ED PROVIDER NOTES
Relative 57.8 36 - 66 %    Lymphocytes % 31.6 24 - 44 %    Monocytes % 6.6 (H) 0 - 4 %    Eosinophils % 2.8 0 - 3 %    Basophils % 0.6 0 - 1 %    Segs Absolute 2.7 K/CU MM    Lymphocytes Absolute 1.5 K/CU MM    Monocytes Absolute 0.3 K/CU MM    Eosinophils Absolute 0.1 K/CU MM    Basophils Absolute 0.0 K/CU MM    Nucleated RBC % 0.0 %    Total Nucleated RBC 0.0 K/CU MM    Total Immature Neutrophil 0.03 K/CU MM    Immature Neutrophil % 0.6 (H) 0 - 0.43 %   CMP   Result Value Ref Range    Sodium 142 135 - 145 MMOL/L    Potassium 4.3 3.5 - 5.1 MMOL/L    Chloride 104 99 - 110 mMol/L    CO2 25 21 - 32 MMOL/L    BUN 10 6 - 23 MG/DL    CREATININE 0.6 (L) 0.9 - 1.3 MG/DL    Glucose 153 (H) 70 - 99 MG/DL    Calcium 8.9 8.3 - 10.6 MG/DL    Alb 3.9 3.4 - 5.0 GM/DL    Total Protein 7.1 6.4 - 8.2 GM/DL    Total Bilirubin 3.4 (H) 0.0 - 1.0 MG/DL    ALT 91 (H) 10 - 40 U/L    AST 28 15 - 37 IU/L    Alkaline Phosphatase 100 40 - 129 IU/L    GFR Non-African American >60 >60 mL/min/1.73m2    GFR African American >60 >60 mL/min/1.73m2    Anion Gap 13 4 - 16   Lipase   Result Value Ref Range    Lipase 13 13 - 60 IU/L   Bilirubin total direct & indirect   Result Value Ref Range    Total Bilirubin 3.4 (H) 0.0 - 1.0 MG/DL    Bilirubin, Direct 2.0 (H) 0.0 - 0.3 MG/DL    Bilirubin, Indirect 1.4 (H) 0 - 0.7 MG/DL   PT - INR   Result Value Ref Range    Protime 22.7 (H) 11.7 - 14.5 SECONDS    INR 1.86 INDEX         IMAGING:  CT ABDOMEN PELVIS W IV CONTRAST Additional Contrast? None   Final Result   1. Cholelithiasis without scan evidence for acute cholecystitis. 2. Diverticulosis without scan evidence for diverticulitis. 3. Left lower lobe pulmonary nodule. Chest CT scan is recommended for further   evaluation. 4. 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                   ED COURSE & MEDICAL DECISION MAKING       Vital signs and nursing notes reviewed during ED course.   I have independently evaluated this patient. Supervising physician present in the Emergency Department, available for consultation, throughout entirety of patient care. Patient presents as above signed out to pending CT abdomen/pelvis. Labs remarkable for elevated total bilirubin of 3.4 with direct bilirubin being 2 and indirect bilirubin being 1.4. Elevated AST of 91. Abdominal exam without peritoneal signs. CT of abdomen pelvis obtained reveals cholelithiasis without evidence of acute cholecystitis and diverticulosis without evidence of diverticulitis. Pulmonary nodule visualized-will require further evaluation but does not require emergent evaluation at this time. I consulted with patient's GI physician, Dr. Lluvia Phelps, and we discussed the patient's case. He would like patient admitted to hospitalist for repeat MRCP tomorrow and he will see the patient tomorrow. I consulted with hospitalist, Dr. Franco Herrera, we discussed the patient's case. Hospitalist will admit the patient at this time for further evaluation and care. All pertinent Lab data and radiographic results reviewed with patient at bedside. The patient and/or the family were informed of the results of any tests/labs/imaging, the treatment plan, and time was allotted to answer questions. Diagnosis, disposition, and treatment plan reviewed in detail with patient. Patient understands and agrees to admission at this time. Final Impression:  1. Elevated bilirubin    2. Elevated alanine aminotransferase (ALT) level    3. Jaundice    4.  Pulmonary nodule        (Please note that portions of this note may have been completed with a voice recognition program. Efforts were made to edit the dictations but occasionally words are mis-transcribed.)    RUDI Mercedes PA-C  04/27/20 [id number]

## 2020-04-28 NOTE — PROGRESS NOTES
PHARMACY ANTICOAGULATION MONITORING SERVICE     Praful Baeza is a 75 y.o. male on warfarin therapy for history of PE. Pharmacy consulted by Dr. Chau Peters for monitoring and adjustment of treatment.      Indication for anticoagulation: History of PE  INR goal: 2 - 3  Warfarin dose prior to admission: 6.5 mg po daily (per previous facility records)    AGE = 75 y.o.  SEX = male  HEIGHT = 5' 10\" (1.778 m)  Wt Readings from Last 3 Encounters:   04/28/20 191 lb (86.6 kg)   01/31/20 178 lb (80.7 kg)   01/26/20 197 lb (89.4 kg)     Recent Labs     04/27/20  1850   INR 1.86   HGB 12.7*   HCT 38.9*          DOSING PLAN:  -INR subtherapeutic on admission (1.86)  -Potential drug interactions: Celebrex continues from prior to admission; PRN Tylenol  -Previous facility records reflect that patient may have missed warfarin doses  -Plan to continue with previously established dose of Warfarin 6.5 mg po daily and trend INR daily  -Pharmacy will continue to monitor patient and adjust therapy as indicated    Salvador Araiza [facility]  4/28/2020  6:08 AM  _______________________________________________

## 2020-04-28 NOTE — ED PROVIDER NOTES
EMERGENCY DEPARTMENT ENCOUNTER      PCP: Xenia Cr DO    CHIEF COMPLAINT    Chief Complaint   Patient presents with    Other     pt sent to ED today for elevated bilirubin     HPI    Pagcheikh 18Eloina Boo is a 75 y.o. male who presents to the emergency department today sent from the [address] [place] [address] home for concern of jaundice and possible elevated bilirubin. Patient was admitted at the end of January for similar, at that time jaundice was contributed to a possible common bile duct stone. GI followed. Since then has been doing well. He is at the [address] [place] [address] home at a nursing facility, they noticed that he become jaundiced. He is denying any abdominal pains, he states he been eating drinking, having bowel movements. He does admit to dark-colored urine, no obvious hematuria. No fevers or chills, recurrent kidney and or urinary tract infections. 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     REVIEW OF SYSTEMS    Constitutional:  Denies fever, chills, weight loss or weakness   HENT:  Denies sore throat or ear pain   Cardiovascular:  Denies chest pain, palpitations   Respiratory:  Denies cough or shortness of breath    GI:  Denies abdominal pain, nausea, vomiting, or diarrhea  :  Denies any urinary symptoms    Musculoskeletal:  Denies back pain  Skin:  Denies rash  Neurologic:  Denies headache, focal weakness or sensory changes   Endocrine:  Denies polyuria or polydypsia   Lymphatic:  Denies swollen glands   All other review of systems are negative  See HPI and nursing notes for additional information     PAST MEDICAL AND SURGICAL HISTORY    Past Medical History:   Diagnosis Date    Allergic rhinitis     Anxiety     BPH (benign prostatic hyperplasia)     CAD (coronary artery disease)     Dermatitis     GERD (gastroesophageal reflux disease)     Hyperlipidemia     Hypertension     Idiopathic peripheral autonomic neuropathy     Insomnia Reactions    Sulfa Antibiotics Other (See Comments)     unknown    Tramadol Other (See Comments)     Shaking       SOCIAL AND FAMILY HISTORY    Social History     Socioeconomic History    Marital status:      Spouse name: None    Number of children: None    Years of education: None    Highest education level: None   Occupational History    None   Social Needs    Financial resource strain: None    Food insecurity     Worry: None     Inability: None    Transportation needs     Medical: None     Non-medical: None   Tobacco Use    Smoking status: Former Smoker    Smokeless tobacco: Never Used   Substance and Sexual Activity    Alcohol use: Never     Frequency: Never    Drug use: Never    Sexual activity: None   Lifestyle    Physical activity     Days per week: None     Minutes per session: None    Stress: None   Relationships    Social connections     Talks on phone: None     Gets together: None     Attends [employer] service: None     Active member of club or organization: None     Attends meetings of clubs or organizations: None     Relationship status: None    Intimate partner violence     Fear of current or ex partner: None     Emotionally abused: None     Physically abused: None     Forced sexual activity: None   Other Topics Concern    None   Social History Narrative    None     History reviewed. No pertinent family history. PHYSICAL EXAM    VITAL SIGNS: BP (!) 151/79   Pulse 73   Temp 98.6 °F (37 °C) (Oral)   Resp 18   Ht 5' 10\" (1.778 m)   Wt 196 lb (88.9 kg)   SpO2 96%   BMI 28.12 kg/m²    Constitutional:  Well developed, Well nourished  HENT:  Normocephalic, Atraumatic, PERRL. EOMI. Sclera clear. Conjunctiva normal, No discharge. Neck/Lymphatics: supple, no JVD, no swollen nodes  Cardiovascular:  Rate regular, normal Rhythm,  no murmurs/rubs/gallops. No JVD  No carotid bruits or murmurs heard in carotids. Respiratory:  Nonlabored breathing.   Normal breath sounds, No Bilirubin 3.4 (H) 0.0 - 1.0 MG/DL    ALT 91 (H) 10 - 40 U/L    AST 28 15 - 37 IU/L    Alkaline Phosphatase 100 40 - 129 IU/L    GFR Non-African American >60 >60 mL/min/1.73m2    GFR African American >60 >60 mL/min/1.73m2    Anion Gap 13 4 - 16   Lipase   Result Value Ref Range    Lipase 13 13 - 60 IU/L   Bilirubin total direct & indirect   Result Value Ref Range    Total Bilirubin 3.4 (H) 0.0 - 1.0 MG/DL    Bilirubin, Direct 2.0 (H) 0.0 - 0.3 MG/DL    Bilirubin, Indirect 1.4 (H) 0 - 0.7 MG/DL   PT - INR   Result Value Ref Range    Protime 22.7 (H) 11.7 - 14.5 SECONDS    INR 1.86 INDEX     RADIOLOGY    Ct Abdomen Pelvis W Iv Contrast Additional Contrast? None    Result Date: 4/27/2020  EXAMINATION: CT OF THE ABDOMEN AND PELVIS WITH CONTRAST 4/27/2020 8:56 pm TECHNIQUE: CT of the abdomen and pelvis was performed with the administration of intravenous contrast. Multiplanar reformatted images are provided for review. Dose modulation, iterative reconstruction, and/or weight based adjustment of the mA/kV was utilized to reduce the radiation dose to as low as reasonably achievable. COMPARISON: 01/28/2020 HISTORY: ORDERING SYSTEM PROVIDED HISTORY: hyperbilirubinemia, jaundace, history of possible CBD TECHNOLOGIST PROVIDED HISTORY: Reason for exam:->hyperbilirubinemia, jaundace, history of possible CBD Additional Contrast?->None Reason for Exam: hyperbilirubinemia, jaundace, history of possible CBD Acuity: Acute Type of Exam: Initial Additional signs and symptoms: unable to raise arms or hold breath Relevant Medical/Surgical History: 75ml isovue 370/ gfr>60 FINDINGS: Lower Chest: There is bibasilar atelectasis. A 7 x 11 mm pulmonary nodule is again seen in the left lower lobe, partially obscured by motion artifact. Coronary artery calcifications are present. Hiatal hernia is again noted. Cystic structure is again seen posterior to the distal esophagus.  Organs: Cholelithiasis is again seen without pericholecystic inflammation. The liver, spleen, adrenal glands and kidneys are unremarkable. The pancreas is atrophic. GI/Bowel: Small bowel caliber is normal.  The patient is status post appendectomy. There are diverticula throughout the colon with no evidence for diverticulitis. Pelvis: The bladder is grossly negative Peritoneum/Retroperitoneum: No adenopathy, mesenteric stranding or free fluid. Aortic caliber is normal.  An inferior vena cava filter remains in place. Bones/Soft Tissues: Foci of gas in the anterior abdominal wall subcutaneous fat is likely related to therapeutic injections. A fatty umbilical hernia is again seen. There is no acute osseous abnormality. Left hip arthroplasty device is again seen. 1. Cholelithiasis without scan evidence for acute cholecystitis. 2. Diverticulosis without scan evidence for diverticulitis. 3. Left lower lobe pulmonary nodule. Chest CT scan is recommended for further evaluation. 4. 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     ED COURSE & MEDICAL DECISION MAKING     Patient presents as above. Emerge etiologies considered. Patient initial vital signs are stable, no active fever. Was noticed by nursing facility where he states that he was looking jaundiced, that history of this is secondary hyper bilirubinemia, at the time he was admitted back at the end of January/beginning of February. At that time it is presumed that he had a possible obstructive common bile duct stone. Has never followed up with GI since his hospitalization. Today he is denying no active symptoms, nursing staff just noticed that he was showing signs of jaundice. He also had dark-colored urine. Work-up initiated including initial blood work, does show elevated total bilirubin of 3.4. Both direct and indirect bilirubin are elevated. Lipase within normal limits. Will obtain urinalysis.   Also obtain a CT scan of the abdomen and pelvis to rule out any underlying

## 2020-04-28 NOTE — H&P
HISTORY AND PHYSICAL  (Hospitalist, Internal Medicine)  IDENTIFYING INFORMATION   PATIENT:  [facility]  MRN:  [id number]  ADMIT DATE: 4/27/2020  TIME OF EVALUATION: 4/27/2020 11:14 PM    CHIEF COMPLAINT     Jaundice   HISTORY OF PRESENT ILLNESS   Reji Woody is a 75 y.o. male admitted for elevated tbili, and abdominal pain that started on Friday evening, about 3 days ago. Patient states that other than the jaundice, he has not noticed any nausea or vomiting, nor has he had any diarrhea. He states that he does not know his stool color, however his urine is dark, but there is no blood in there,. He does state that he has some decreased appetite, however has been able to eat without any major issues. Has no other complaints of shortness of breath, dizziness, fevers, chills, new joint pains or rash. Denies any sick contacts, cough or recent travels. Of note he was admitted in January for similar complaints, however MRCP did not reveal much. PMH listed below:    PAST MEDICAL, SURGICAL, FAMILY, and SOCIAL HISTORY     Past Medical History:   Diagnosis Date    Allergic rhinitis     Anxiety     BPH (benign prostatic hyperplasia)     CAD (coronary artery disease)     Dermatitis     GERD (gastroesophageal reflux disease)     Hyperlipidemia     Hypertension     Idiopathic peripheral autonomic neuropathy     Insomnia     Meniere's disease     Osteoarthritis     Polyneuropathy     Pulmonary embolism (HCC)     Urinary retention     Vitamin B12 deficiency anemia      Past Surgical History:   Procedure Laterality Date    ABOVE KNEE AMPUTATION Right     APPENDECTOMY      TONSILLECTOMY       History reviewed. No pertinent family history. Family Hx of HTN  Family Hx as reviewed above, otherwise non-contributory  Social History     Socioeconomic History    Marital status:       Spouse name: None    Number of children: None    Years of education: None    Highest education level: None  tamsulosin (FLOMAX) 0.4 MG capsule Take 0.4 mg by mouth daily      Cholecalciferol (VITAMIN D3) 125 MCG (5000 UT) TABS Take 5,000 Units by mouth daily      meclizine (ANTIVERT) 12.5 MG tablet Take 12.5 mg by mouth 2 times daily      guaiFENesin (MUCINEX) 600 MG extended release tablet Take 1,200 mg by mouth 2 times daily      gabapentin (NEURONTIN) 400 MG capsule Take 800 mg by mouth 3 times daily.  Sodium Bicarbonate-Citric Acid (LEODNA-SELTZER HEARTBURN PO) Take 2 tablets by mouth daily as needed      magnesium hydroxide (MILK OF MAGNESIA) 400 MG/5ML suspension Take 30 mLs by mouth daily as needed for Constipation      polyethylene glycol (GLYCOLAX) packet Take 17 g by mouth daily as needed for Constipation      terbinafine (LAMISIL) 1 % cream Apply topically 2 times daily Apply topically 2 times daily. Allergies  Allergies   Allergen Reactions    Sulfa Antibiotics Other (See Comments)     unknown    Tramadol Other (See Comments)     Shaking       REVIEW OF SYSTEMS   Within above limitations. 14 point review of systems reviewed. Pertinent positive or negative as per HPI or otherwise negative per 14 point systems review. PHYSICAL EXAM     Wt Readings from Last 3 Encounters:   04/27/20 196 lb (88.9 kg)   01/31/20 178 lb (80.7 kg)   01/26/20 197 lb (89.4 kg)       Blood pressure (!) 150/69, pulse 70, temperature 98.6 °F (37 °C), temperature source Oral, resp. rate 14, height 5' 10\" (1.778 m), weight 196 lb (88.9 kg), SpO2 92 %. General - AAO x 3, icteric  Psych - Appropriate affect/speech. No agitation  Eyes - Eye lids intact. No scleral icterus  ENT - Lips wnl. External ear clear/dry/intact. No thyromegaly on inspection, icterus  Neuro - No gross peripheral or central neuro deficits on inspection  Heart - Sinus. RRR. S1 and S2 present. No added HS/murmurs appreciated. No elevated JVD appreciated  Lung - Adequate air entry b/l, No crackles/wheezes appreciated  GI - Soft.  No guarding/rigidity. No hepatosplenomegaly/ascites. BS+; mild right upper quadrant tenderness palpation   - No CVA/suprapubic tenderness or palpable bladder distension  Skin - Intact. No rash/petechiae/ecchymosis. Warm extremities; yellow skin  MSK - left AKA    Lines/Drains/Airways/Wounds:  [unfilled]    LABS AND IMAGING   CBC  [unfilled]    Last 3 Hemoglobin  Lab Results   Component Value Date    HGB 12.7 04/27/2020    HGB 13.8 01/30/2020    HGB 12.9 01/29/2020     Last 3 WBC/ANC  Lab Results   Component Value Date    WBC 4.7 04/27/2020    WBC 5.0 01/30/2020    WBC 5.4 01/29/2020     No components found for: GRNLOCTYABS  Last 3 Platelets  No results found for: PLATELET  Chemistry  [unfilled]  [unfilled]  No results found for: LDH  Coagulation Studies  Lab Results   Component Value Date    INR 1.86 04/27/2020     Liver Function Studies  Lab Results   Component Value Date    ALT 91 04/27/2020    AST 28 04/27/2020    ALKPHOS 100 04/27/2020       Recent Imaging        Relevant labs and imaging reviewed    ASSESSMENT AND PLAN   Rebeca Wadsworth is a 75 y.o. male p/w      Jaundice and elevated tbili with cholithiasis, possible choledocholithiasis, unlikely cholecysititis  - LFT  Mildly  Eleveated, with elevated tbili  - lipase neg  - IV fluids    - monitor LFTs  - PRN pain medications  - GI consult, repeat-MRCP?  - npo aftermidnight    H/o PE year 2000  Has IVC filter placed at same time, no reported h/o acute bleeding  - on coumadin, subtheraputic  - c/w coumadin    HTN  - amlodapine        Case d/w ED provider    DVT ppx; coumadin  Code status: DNR- [address], Internal Medicine  4/27/2020 at 11:14 PM

## 2020-04-28 NOTE — PROGRESS NOTES
appreciable murmurs, rubs, or gallops. Peripheral pulses equal bilaterally and palpable. No peripheral edema. GI Abdomen is soft without significant tenderness, masses, or guarding. Bowel sounds are normoactive. Rectal exam deferred. NEURO Cranial nerves appear grossly intact, normal speech, no lateralizing weakness.       Medications:   Medications:    amitriptyline  25 mg Oral Nightly    amLODIPine  5 mg Oral Daily    atorvastatin  10 mg Oral Daily    bisacodyl  10 mg Oral Daily    celecoxib  200 mg Oral Daily    vitamin D  5,000 Units Oral Daily    ferrous sulfate  325 mg Oral Daily with breakfast    finasteride  5 mg Oral Daily    gabapentin  800 mg Oral TID    guaiFENesin  1,200 mg Oral BID    cetirizine  10 mg Oral Daily    meclizine  12.5 mg Oral BID    pantoprazole  40 mg Oral QAM AC    tamsulosin  0.4 mg Oral Daily    sodium chloride flush  10 mL Intravenous 2 times per day    warfarin  6.5 mg Oral Daily      Infusions:    sodium chloride 75 mL/hr at 04/28/20 0229     PRN Meds: polyethylene glycol, 17 g, Daily PRN  sodium chloride flush, 10 mL, PRN  acetaminophen, 650 mg, Q6H PRN    Or  acetaminophen, 650 mg, Q6H PRN  magnesium hydroxide, 30 mL, Daily PRN  promethazine, 12.5 mg, Q6H PRN    Or  ondansetron, 4 mg, Q6H PRN          Electronically signed by Ciarra Truong MD on 4/28/2020 at 12:13 PM

## 2020-04-29 PROBLEM — K80.21 CHOLELITHIASIS WITH OBSTRUCTION: Status: ACTIVE | Noted: 2020-04-27

## 2020-04-29 PROBLEM — K80.51 CHOLEDOCHOLITHIASIS WITH OBSTRUCTION: Status: ACTIVE | Noted: 2020-04-27

## 2020-04-29 PROCEDURE — 6370000000 HC RX 637 (ALT 250 FOR IP): Performed by: INTERNAL MEDICINE

## 2020-04-29 PROCEDURE — 85610 PROTHROMBIN TIME: CPT

## 2020-04-29 PROCEDURE — 80048 BASIC METABOLIC PNL TOTAL CA: CPT

## 2020-04-29 PROCEDURE — 2580000003 HC RX 258: Performed by: INTERNAL MEDICINE

## 2020-04-29 PROCEDURE — 82248 BILIRUBIN DIRECT: CPT

## 2020-04-29 PROCEDURE — 99221 1ST HOSP IP/OBS SF/LOW 40: CPT | Performed by: SURGERY

## 2020-04-29 PROCEDURE — 94761 N-INVAS EAR/PLS OXIMETRY MLT: CPT

## 2020-04-29 PROCEDURE — 1200000000 HC SEMI PRIVATE

## 2020-04-29 PROCEDURE — 2580000003 HC RX 258: Performed by: SURGERY

## 2020-04-29 PROCEDURE — 80053 COMPREHEN METABOLIC PANEL: CPT

## 2020-04-29 PROCEDURE — 85025 COMPLETE CBC W/AUTO DIFF WBC: CPT

## 2020-04-29 PROCEDURE — 36415 COLL VENOUS BLD VENIPUNCTURE: CPT

## 2020-04-29 RX ORDER — SODIUM CHLORIDE 0.9 % (FLUSH) 0.9 %
10 SYRINGE (ML) INJECTION PRN
Status: DISCONTINUED | OUTPATIENT
Start: 2020-04-29 | End: 2020-04-30 | Stop reason: HOSPADM

## 2020-04-29 RX ORDER — CEFAZOLIN SODIUM 2 G/100ML
2 INJECTION, SOLUTION INTRAVENOUS
Status: DISCONTINUED | OUTPATIENT
Start: 2020-04-29 | End: 2020-04-30

## 2020-04-29 RX ORDER — SODIUM CHLORIDE, SODIUM LACTATE, POTASSIUM CHLORIDE, CALCIUM CHLORIDE 600; 310; 30; 20 MG/100ML; MG/100ML; MG/100ML; MG/100ML
INJECTION, SOLUTION INTRAVENOUS CONTINUOUS
Status: DISCONTINUED | OUTPATIENT
Start: 2020-04-29 | End: 2020-04-30

## 2020-04-29 RX ORDER — SODIUM CHLORIDE 0.9 % (FLUSH) 0.9 %
10 SYRINGE (ML) INJECTION EVERY 12 HOURS SCHEDULED
Status: DISCONTINUED | OUTPATIENT
Start: 2020-04-29 | End: 2020-04-30 | Stop reason: HOSPADM

## 2020-04-29 RX ADMIN — GUAIFENESIN 1200 MG: 600 TABLET, EXTENDED RELEASE ORAL at 20:18

## 2020-04-29 RX ADMIN — SODIUM CHLORIDE: 9 INJECTION, SOLUTION INTRAVENOUS at 11:18

## 2020-04-29 RX ADMIN — SODIUM CHLORIDE, POTASSIUM CHLORIDE, SODIUM LACTATE AND CALCIUM CHLORIDE: 600; 310; 30; 20 INJECTION, SOLUTION INTRAVENOUS at 20:18

## 2020-04-29 RX ADMIN — GABAPENTIN 800 MG: 400 CAPSULE ORAL at 16:34

## 2020-04-29 RX ADMIN — CETIRIZINE HYDROCHLORIDE 10 MG: 10 TABLET, FILM COATED ORAL at 11:16

## 2020-04-29 RX ADMIN — CELECOXIB 200 MG: 200 CAPSULE ORAL at 10:58

## 2020-04-29 RX ADMIN — AMITRIPTYLINE HYDROCHLORIDE 25 MG: 25 TABLET, FILM COATED ORAL at 20:18

## 2020-04-29 RX ADMIN — MECLIZINE 12.5 MG: 12.5 TABLET ORAL at 20:18

## 2020-04-29 RX ADMIN — GUAIFENESIN 1200 MG: 600 TABLET, EXTENDED RELEASE ORAL at 11:16

## 2020-04-29 RX ADMIN — MECLIZINE 12.5 MG: 12.5 TABLET ORAL at 10:59

## 2020-04-29 RX ADMIN — FERROUS SULFATE TAB 325 MG (65 MG ELEMENTAL FE) 325 MG: 325 (65 FE) TAB at 10:58

## 2020-04-29 RX ADMIN — GABAPENTIN 800 MG: 400 CAPSULE ORAL at 20:18

## 2020-04-29 RX ADMIN — TAMSULOSIN HYDROCHLORIDE 0.4 MG: 0.4 CAPSULE ORAL at 11:16

## 2020-04-29 RX ADMIN — AMLODIPINE BESYLATE 5 MG: 5 TABLET ORAL at 11:16

## 2020-04-29 RX ADMIN — GABAPENTIN 800 MG: 400 CAPSULE ORAL at 11:15

## 2020-04-29 RX ADMIN — FINASTERIDE 5 MG: 5 TABLET, FILM COATED ORAL at 10:58

## 2020-04-29 RX ADMIN — ATORVASTATIN CALCIUM 10 MG: 10 TABLET, FILM COATED ORAL at 10:58

## 2020-04-29 RX ADMIN — Medication 5000 UNITS: at 10:59

## 2020-04-29 RX ADMIN — BISACODYL 10 MG: 5 TABLET, COATED ORAL at 10:58

## 2020-04-29 RX ADMIN — PANTOPRAZOLE SODIUM 40 MG: 40 TABLET, DELAYED RELEASE ORAL at 11:16

## 2020-04-29 ASSESSMENT — ENCOUNTER SYMPTOMS
BLOOD IN STOOL: 0
VOMITING: 0
DIARRHEA: 0
EYE REDNESS: 0
CONSTIPATION: 0
COLOR CHANGE: 1
CHEST TIGHTNESS: 0
ABDOMINAL PAIN: 1
SORE THROAT: 0
NAUSEA: 0
BACK PAIN: 1
EYE DISCHARGE: 0
ABDOMINAL DISTENTION: 0
SHORTNESS OF BREATH: 0

## 2020-04-29 ASSESSMENT — PAIN SCALES - GENERAL
PAINLEVEL_OUTOF10: 0
PAINLEVEL_OUTOF10: 0

## 2020-04-29 NOTE — CONSULTS
GENERAL SURGERY INPATIENT CONSULT NOTE  [facility]    PATIENT: Soto Ayon, 1945, 75 y.o., male  MRN: [id number]    Physician: Walter Waterman MD    Date: 4/29/20    Reason for Evaluation:  choledocholithiasis   Chief Complaint   Patient presents with    Other     pt sent to ED today for elevated bilirubin       Requesting Physician: Ruben Diaz MD    CHIEF COMPLAINT:  jaundice   Chief Complaint   Patient presents with    Other     pt sent to ED today for elevated bilirubin     History Obtained From:  patient, electronic medical record    HISTORY OF PRESENT ILLNESS:    Walterjennifer Szymanski is a 75 y.o. male presenting with jaundice. He lives in the [facility], and his assistant caretaker noticed a few days ago that his eyes appeared yellowed. He has had a similar episode a few years ago where he was noted to be jaundiced and was told he had gallbladder sludge, but at the time he was about to have plastic surgery on his leg and never ended up following up about his gallbladder. He also had an episode of jaundice about 20 years ago where he had Hepatitis A, denies any persistent liver problems or other hepatitis. About 6 weeks ago he had an episode of abdominal and back pain which was cramping, and was moderate to severe. He sought treatment and was ruled out for a cardiac etiology. He was given medication in the ED which made his abdomen feel better, but he doesn't know what it was. He has noticed occasional discomfort after eating fatty or greasy foods, but denies nausea or vomiting. He has regular bowel movements as long as he takes a stool softener a few times a week. In the ED he had evaluation including labs showing his total bilirubin was 3.4, direct bilirubin was 2.0. He has a history of PE many years ago for which he is on chronic Coumadin, his most recent INR was 1.86 on 4/27/20.  CT showed cholelithiasis without acute cholecystitis, MRCP showed 4/27/2020  EXAMINATION: CT OF THE ABDOMEN AND PELVIS WITH CONTRAST 4/27/2020 8:56 pm TECHNIQUE: CT of the abdomen and pelvis was performed with the administration of intravenous contrast. Multiplanar reformatted images are provided for review. Dose modulation, iterative reconstruction, and/or weight based adjustment of the mA/kV was utilized to reduce the radiation dose to as low as reasonably achievable. COMPARISON: 01/28/2020 HISTORY: ORDERING SYSTEM PROVIDED HISTORY: hyperbilirubinemia, jaundace, history of possible CBD TECHNOLOGIST PROVIDED HISTORY: Reason for exam:->hyperbilirubinemia, jaundace, history of possible CBD Additional Contrast?->None Reason for Exam: hyperbilirubinemia, jaundace, history of possible CBD Acuity: Acute Type of Exam: Initial Additional signs and symptoms: unable to raise arms or hold breath Relevant Medical/Surgical History: 75ml isovue 370/ gfr>60 FINDINGS: Lower Chest: There is bibasilar atelectasis. A 7 x 11 mm pulmonary nodule is again seen in the left lower lobe, partially obscured by motion artifact. Coronary artery calcifications are present. Hiatal hernia is again noted. Cystic structure is again seen posterior to the distal esophagus. Organs: Cholelithiasis is again seen without pericholecystic inflammation. The liver, spleen, adrenal glands and kidneys are unremarkable. The pancreas is atrophic. GI/Bowel: Small bowel caliber is normal.  The patient is status post appendectomy. There are diverticula throughout the colon with no evidence for diverticulitis. Pelvis: The bladder is grossly negative Peritoneum/Retroperitoneum: No adenopathy, mesenteric stranding or free fluid. Aortic caliber is normal.  An inferior vena cava filter remains in place. Bones/Soft Tissues: Foci of gas in the anterior abdominal wall subcutaneous fat is likely related to therapeutic injections. A fatty umbilical hernia is again seen. There is no acute osseous abnormality.   Left hip arthroplasty device is again seen. 1. Cholelithiasis without scan evidence for acute cholecystitis. 2. Diverticulosis without scan evidence for diverticulitis. 3. Left lower lobe pulmonary nodule. Chest CT scan is recommended for further evaluation. 4. 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     Mri Abdomen Wo Contrast Mrcp    Result Date: 4/28/2020  EXAMINATION: MRCP, 4/28/2020 11:16 am TECHNIQUE: After initial T2 axial and coronal images, thick slab, thin slab and 3D coronal MRCP sequences were obtained without the administration of intravenous contrast.  MIP images are provided for review. COMPARISON: CT 04/27/2020 and 01/28/2020, MRI 01/31/2020 HISTORY: ORDERING SYSTEM PROVIDED HISTORY: Jaundice and gallstones TECHNOLOGIST PROVIDED HISTORY: Reason for exam:->Jaundice and gallstones Reason for Exam: Jaudice, elevated LFTs, abd pain x 3 days. Follow-up exam FINDINGS: Gallbladder: There is cholelithiasis and gallbladder sludge. No wall thickening or pericholecystic fluid. Bile Ducts: The proximal common bile duct measures up to 9 mm and tapers to approximately 7 mm distally. Very minimal intrahepatic biliary duct dilation. There are multiple filling defects within the mid to distal common bile duct the largest measuring up to 3 mm (series 10, image 51). Pancreatic Duct: Normal in course and caliber. Other:  Mild loss of signal intensity of the liver parenchyma on the T1 out of phase images. Limited evaluation of the unenhanced liver, spleen, pancreas and adrenal glands are without focal abnormality. No hydronephrosis or perinephric stranding. Subcentimeter right renal cyst.  No ascites or drainable fluid collection. Small to moderate hiatal hernia. The visualized bowel is otherwise unremarkable. The aorta is normal in caliber. Lung bases are clear. No acute osseous abnormality.   There is a partially visualized cystic structure along the left paravertebral region seen only on the

## 2020-04-30 ENCOUNTER — ANESTHESIA EVENT (OUTPATIENT)
Dept: ENDOSCOPY | Age: 75
DRG: 417 | End: 2020-04-30
Payer: MEDICARE

## 2020-04-30 ENCOUNTER — ANESTHESIA EVENT (OUTPATIENT)
Dept: OPERATING ROOM | Age: 75
DRG: 417 | End: 2020-04-30
Payer: MEDICARE

## 2020-04-30 ENCOUNTER — ANESTHESIA (OUTPATIENT)
Dept: ENDOSCOPY | Age: 75
DRG: 417 | End: 2020-04-30
Payer: MEDICARE

## 2020-04-30 ENCOUNTER — APPOINTMENT (OUTPATIENT)
Dept: GENERAL RADIOLOGY | Age: 75
DRG: 417 | End: 2020-04-30
Payer: MEDICARE

## 2020-04-30 VITALS
DIASTOLIC BLOOD PRESSURE: 101 MMHG | OXYGEN SATURATION: 100 % | SYSTOLIC BLOOD PRESSURE: 147 MMHG | RESPIRATION RATE: 21 BRPM

## 2020-04-30 PROCEDURE — 36415 COLL VENOUS BLD VENIPUNCTURE: CPT

## 2020-04-30 PROCEDURE — 86850 RBC ANTIBODY SCREEN: CPT

## 2020-04-30 PROCEDURE — P9017 PLASMA 1 DONOR FRZ W/IN 8 HR: HCPCS

## 2020-04-30 PROCEDURE — 2580000003 HC RX 258: Performed by: NURSE ANESTHETIST, CERTIFIED REGISTERED

## 2020-04-30 PROCEDURE — 6360000002 HC RX W HCPCS: Performed by: NURSE ANESTHETIST, CERTIFIED REGISTERED

## 2020-04-30 PROCEDURE — 3700000001 HC ADD 15 MINUTES (ANESTHESIA): Performed by: SURGERY

## 2020-04-30 PROCEDURE — 43260 ERCP W/SPECIMEN COLLECTION: CPT | Performed by: SURGERY

## 2020-04-30 PROCEDURE — 94761 N-INVAS EAR/PLS OXIMETRY MLT: CPT

## 2020-04-30 PROCEDURE — C1769 GUIDE WIRE: HCPCS | Performed by: SURGERY

## 2020-04-30 PROCEDURE — 1200000000 HC SEMI PRIVATE

## 2020-04-30 PROCEDURE — 86927 PLASMA FRESH FROZEN: CPT

## 2020-04-30 PROCEDURE — 86901 BLOOD TYPING SEROLOGIC RH(D): CPT

## 2020-04-30 PROCEDURE — 6370000000 HC RX 637 (ALT 250 FOR IP): Performed by: SURGERY

## 2020-04-30 PROCEDURE — 6370000000 HC RX 637 (ALT 250 FOR IP): Performed by: INTERNAL MEDICINE

## 2020-04-30 PROCEDURE — 2709999900 HC NON-CHARGEABLE SUPPLY: Performed by: SURGERY

## 2020-04-30 PROCEDURE — 85610 PROTHROMBIN TIME: CPT

## 2020-04-30 PROCEDURE — 36430 TRANSFUSION BLD/BLD COMPNT: CPT

## 2020-04-30 PROCEDURE — 3609018800 HC ERCP DX COLLECTION SPECIMEN BRUSHING/WASHING: Performed by: SURGERY

## 2020-04-30 PROCEDURE — 2500000003 HC RX 250 WO HCPCS: Performed by: NURSE ANESTHETIST, CERTIFIED REGISTERED

## 2020-04-30 PROCEDURE — 80053 COMPREHEN METABOLIC PANEL: CPT

## 2020-04-30 PROCEDURE — 76937 US GUIDE VASCULAR ACCESS: CPT

## 2020-04-30 PROCEDURE — 76000 FLUOROSCOPY <1 HR PHYS/QHP: CPT

## 2020-04-30 PROCEDURE — 2720000010 HC SURG SUPPLY STERILE: Performed by: SURGERY

## 2020-04-30 PROCEDURE — 3700000000 HC ANESTHESIA ATTENDED CARE: Performed by: SURGERY

## 2020-04-30 PROCEDURE — 7100000000 HC PACU RECOVERY - FIRST 15 MIN: Performed by: SURGERY

## 2020-04-30 PROCEDURE — 6360000002 HC RX W HCPCS: Performed by: SURGERY

## 2020-04-30 PROCEDURE — 0DJ08ZZ INSPECTION OF UPPER INTESTINAL TRACT, VIA NATURAL OR ARTIFICIAL OPENING ENDOSCOPIC: ICD-10-PCS | Performed by: SURGERY

## 2020-04-30 PROCEDURE — 2580000003 HC RX 258: Performed by: SURGERY

## 2020-04-30 PROCEDURE — 86900 BLOOD TYPING SEROLOGIC ABO: CPT

## 2020-04-30 PROCEDURE — 85730 THROMBOPLASTIN TIME PARTIAL: CPT

## 2020-04-30 PROCEDURE — 7100000001 HC PACU RECOVERY - ADDTL 15 MIN: Performed by: SURGERY

## 2020-04-30 RX ORDER — LIDOCAINE HYDROCHLORIDE 20 MG/ML
INJECTION, SOLUTION INFILTRATION; PERINEURAL PRN
Status: DISCONTINUED | OUTPATIENT
Start: 2020-04-30 | End: 2020-04-30 | Stop reason: SDUPTHER

## 2020-04-30 RX ORDER — SODIUM CHLORIDE 0.9 % (FLUSH) 0.9 %
10 SYRINGE (ML) INJECTION PRN
Status: DISCONTINUED | OUTPATIENT
Start: 2020-04-30 | End: 2020-05-06 | Stop reason: HOSPADM

## 2020-04-30 RX ORDER — PROPOFOL 10 MG/ML
INJECTION, EMULSION INTRAVENOUS PRN
Status: DISCONTINUED | OUTPATIENT
Start: 2020-04-30 | End: 2020-04-30 | Stop reason: SDUPTHER

## 2020-04-30 RX ORDER — FENTANYL CITRATE 50 UG/ML
INJECTION, SOLUTION INTRAMUSCULAR; INTRAVENOUS PRN
Status: DISCONTINUED | OUTPATIENT
Start: 2020-04-30 | End: 2020-04-30 | Stop reason: SDUPTHER

## 2020-04-30 RX ORDER — 0.9 % SODIUM CHLORIDE 0.9 %
250 INTRAVENOUS SOLUTION INTRAVENOUS ONCE
Status: DISCONTINUED | OUTPATIENT
Start: 2020-04-30 | End: 2020-05-06 | Stop reason: HOSPADM

## 2020-04-30 RX ORDER — CEFAZOLIN SODIUM 2 G/100ML
2 INJECTION, SOLUTION INTRAVENOUS
Status: COMPLETED | OUTPATIENT
Start: 2020-04-30 | End: 2020-04-30

## 2020-04-30 RX ORDER — SODIUM CHLORIDE 0.9 % (FLUSH) 0.9 %
10 SYRINGE (ML) INJECTION EVERY 12 HOURS SCHEDULED
Status: DISCONTINUED | OUTPATIENT
Start: 2020-04-30 | End: 2020-04-30 | Stop reason: HOSPADM

## 2020-04-30 RX ORDER — ONDANSETRON 2 MG/ML
4 INJECTION INTRAMUSCULAR; INTRAVENOUS EVERY 6 HOURS PRN
Status: DISCONTINUED | OUTPATIENT
Start: 2020-04-30 | End: 2020-05-06 | Stop reason: HOSPADM

## 2020-04-30 RX ORDER — PROMETHAZINE HYDROCHLORIDE 25 MG/ML
6.25 INJECTION, SOLUTION INTRAMUSCULAR; INTRAVENOUS
Status: DISCONTINUED | OUTPATIENT
Start: 2020-04-30 | End: 2020-04-30 | Stop reason: HOSPADM

## 2020-04-30 RX ORDER — HYDROMORPHONE HCL 110MG/55ML
0.5 PATIENT CONTROLLED ANALGESIA SYRINGE INTRAVENOUS EVERY 5 MIN PRN
Status: DISCONTINUED | OUTPATIENT
Start: 2020-04-30 | End: 2020-04-30 | Stop reason: HOSPADM

## 2020-04-30 RX ORDER — FENTANYL CITRATE 50 UG/ML
50 INJECTION, SOLUTION INTRAMUSCULAR; INTRAVENOUS EVERY 5 MIN PRN
Status: DISCONTINUED | OUTPATIENT
Start: 2020-04-30 | End: 2020-04-30 | Stop reason: HOSPADM

## 2020-04-30 RX ORDER — ONDANSETRON 2 MG/ML
INJECTION INTRAMUSCULAR; INTRAVENOUS PRN
Status: DISCONTINUED | OUTPATIENT
Start: 2020-04-30 | End: 2020-04-30 | Stop reason: SDUPTHER

## 2020-04-30 RX ORDER — SODIUM CHLORIDE 0.9 % (FLUSH) 0.9 %
10 SYRINGE (ML) INJECTION EVERY 12 HOURS SCHEDULED
Status: DISCONTINUED | OUTPATIENT
Start: 2020-04-30 | End: 2020-05-06 | Stop reason: HOSPADM

## 2020-04-30 RX ORDER — SODIUM CHLORIDE, SODIUM LACTATE, POTASSIUM CHLORIDE, CALCIUM CHLORIDE 600; 310; 30; 20 MG/100ML; MG/100ML; MG/100ML; MG/100ML
INJECTION, SOLUTION INTRAVENOUS CONTINUOUS PRN
Status: DISCONTINUED | OUTPATIENT
Start: 2020-04-30 | End: 2020-04-30 | Stop reason: SDUPTHER

## 2020-04-30 RX ORDER — ONDANSETRON 2 MG/ML
4 INJECTION INTRAMUSCULAR; INTRAVENOUS
Status: DISCONTINUED | OUTPATIENT
Start: 2020-04-30 | End: 2020-04-30 | Stop reason: HOSPADM

## 2020-04-30 RX ORDER — FENTANYL CITRATE 50 UG/ML
25 INJECTION, SOLUTION INTRAMUSCULAR; INTRAVENOUS EVERY 5 MIN PRN
Status: DISCONTINUED | OUTPATIENT
Start: 2020-04-30 | End: 2020-04-30 | Stop reason: HOSPADM

## 2020-04-30 RX ORDER — SODIUM CHLORIDE 0.9 % (FLUSH) 0.9 %
10 SYRINGE (ML) INJECTION PRN
Status: DISCONTINUED | OUTPATIENT
Start: 2020-04-30 | End: 2020-04-30 | Stop reason: HOSPADM

## 2020-04-30 RX ORDER — SODIUM CHLORIDE, SODIUM LACTATE, POTASSIUM CHLORIDE, CALCIUM CHLORIDE 600; 310; 30; 20 MG/100ML; MG/100ML; MG/100ML; MG/100ML
INJECTION, SOLUTION INTRAVENOUS CONTINUOUS
Status: DISCONTINUED | OUTPATIENT
Start: 2020-04-30 | End: 2020-04-30

## 2020-04-30 RX ORDER — ROCURONIUM BROMIDE 10 MG/ML
INJECTION, SOLUTION INTRAVENOUS PRN
Status: DISCONTINUED | OUTPATIENT
Start: 2020-04-30 | End: 2020-04-30 | Stop reason: SDUPTHER

## 2020-04-30 RX ORDER — PROMETHAZINE HYDROCHLORIDE 25 MG/1
12.5 TABLET ORAL EVERY 6 HOURS PRN
Status: DISCONTINUED | OUTPATIENT
Start: 2020-04-30 | End: 2020-05-06 | Stop reason: HOSPADM

## 2020-04-30 RX ORDER — HYDRALAZINE HYDROCHLORIDE 20 MG/ML
5 INJECTION INTRAMUSCULAR; INTRAVENOUS EVERY 10 MIN PRN
Status: DISCONTINUED | OUTPATIENT
Start: 2020-04-30 | End: 2020-04-30 | Stop reason: HOSPADM

## 2020-04-30 RX ORDER — LABETALOL HYDROCHLORIDE 5 MG/ML
5 INJECTION, SOLUTION INTRAVENOUS EVERY 10 MIN PRN
Status: DISCONTINUED | OUTPATIENT
Start: 2020-04-30 | End: 2020-04-30 | Stop reason: HOSPADM

## 2020-04-30 RX ORDER — HYDROMORPHONE HCL 110MG/55ML
0.25 PATIENT CONTROLLED ANALGESIA SYRINGE INTRAVENOUS EVERY 5 MIN PRN
Status: DISCONTINUED | OUTPATIENT
Start: 2020-04-30 | End: 2020-04-30 | Stop reason: HOSPADM

## 2020-04-30 RX ORDER — CELECOXIB 200 MG/1
200 CAPSULE ORAL DAILY
Status: DISCONTINUED | OUTPATIENT
Start: 2020-05-01 | End: 2020-05-06 | Stop reason: HOSPADM

## 2020-04-30 RX ADMIN — SODIUM CHLORIDE, PRESERVATIVE FREE 10 ML: 5 INJECTION INTRAVENOUS at 14:25

## 2020-04-30 RX ADMIN — FERROUS SULFATE TAB 325 MG (65 MG ELEMENTAL FE) 325 MG: 325 (65 FE) TAB at 06:18

## 2020-04-30 RX ADMIN — FENTANYL CITRATE 50 MCG: 50 INJECTION INTRAMUSCULAR; INTRAVENOUS at 11:44

## 2020-04-30 RX ADMIN — PANTOPRAZOLE SODIUM 40 MG: 40 TABLET, DELAYED RELEASE ORAL at 06:18

## 2020-04-30 RX ADMIN — SODIUM CHLORIDE, PRESERVATIVE FREE 10 ML: 5 INJECTION INTRAVENOUS at 20:16

## 2020-04-30 RX ADMIN — ONDANSETRON 4 MG: 2 INJECTION INTRAMUSCULAR; INTRAVENOUS at 12:21

## 2020-04-30 RX ADMIN — FENTANYL CITRATE 50 MCG: 50 INJECTION INTRAMUSCULAR; INTRAVENOUS at 11:23

## 2020-04-30 RX ADMIN — SUGAMMADEX 100 MG: 100 INJECTION, SOLUTION INTRAVENOUS at 12:21

## 2020-04-30 RX ADMIN — CEFAZOLIN SODIUM 2 G: 2 INJECTION, SOLUTION INTRAVENOUS at 11:12

## 2020-04-30 RX ADMIN — GLUCAGON HYDROCHLORIDE 0.5 MG: KIT at 12:11

## 2020-04-30 RX ADMIN — ROCURONIUM BROMIDE 35 MG: 10 INJECTION INTRAVENOUS at 11:23

## 2020-04-30 RX ADMIN — GUAIFENESIN 1200 MG: 600 TABLET, EXTENDED RELEASE ORAL at 20:16

## 2020-04-30 RX ADMIN — LIDOCAINE HYDROCHLORIDE 100 MG: 20 INJECTION, SOLUTION INFILTRATION; PERINEURAL at 11:23

## 2020-04-30 RX ADMIN — ATORVASTATIN CALCIUM 10 MG: 10 TABLET, FILM COATED ORAL at 14:30

## 2020-04-30 RX ADMIN — ROCURONIUM BROMIDE 15 MG: 10 INJECTION INTRAVENOUS at 12:01

## 2020-04-30 RX ADMIN — AMITRIPTYLINE HYDROCHLORIDE 25 MG: 25 TABLET, FILM COATED ORAL at 20:16

## 2020-04-30 RX ADMIN — PROPOFOL 150 MG: 10 INJECTION, EMULSION INTRAVENOUS at 11:23

## 2020-04-30 RX ADMIN — SODIUM CHLORIDE, POTASSIUM CHLORIDE, SODIUM LACTATE AND CALCIUM CHLORIDE: 600; 310; 30; 20 INJECTION, SOLUTION INTRAVENOUS at 11:03

## 2020-04-30 RX ADMIN — SODIUM CHLORIDE, POTASSIUM CHLORIDE, SODIUM LACTATE AND CALCIUM CHLORIDE: 600; 310; 30; 20 INJECTION, SOLUTION INTRAVENOUS at 03:37

## 2020-04-30 RX ADMIN — GABAPENTIN 800 MG: 400 CAPSULE ORAL at 14:30

## 2020-04-30 RX ADMIN — GABAPENTIN 800 MG: 400 CAPSULE ORAL at 20:16

## 2020-04-30 RX ADMIN — MECLIZINE 12.5 MG: 12.5 TABLET ORAL at 20:16

## 2020-04-30 ASSESSMENT — PULMONARY FUNCTION TESTS
PIF_VALUE: 20
PIF_VALUE: 0
PIF_VALUE: 0
PIF_VALUE: 19
PIF_VALUE: 5
PIF_VALUE: 18
PIF_VALUE: 18
PIF_VALUE: 19
PIF_VALUE: 20
PIF_VALUE: 0
PIF_VALUE: 20
PIF_VALUE: 18
PIF_VALUE: 20
PIF_VALUE: 0
PIF_VALUE: 1
PIF_VALUE: 20
PIF_VALUE: 20
PIF_VALUE: 18
PIF_VALUE: 19
PIF_VALUE: 0
PIF_VALUE: 20
PIF_VALUE: 19
PIF_VALUE: 19
PIF_VALUE: 2
PIF_VALUE: 1
PIF_VALUE: 19
PIF_VALUE: 0
PIF_VALUE: 1
PIF_VALUE: 0
PIF_VALUE: 0
PIF_VALUE: 19
PIF_VALUE: 1
PIF_VALUE: 17
PIF_VALUE: 1
PIF_VALUE: 17
PIF_VALUE: 0
PIF_VALUE: 18
PIF_VALUE: 29
PIF_VALUE: 20
PIF_VALUE: 19
PIF_VALUE: 0
PIF_VALUE: 19
PIF_VALUE: 20
PIF_VALUE: 1
PIF_VALUE: 0
PIF_VALUE: 20
PIF_VALUE: 18
PIF_VALUE: 19
PIF_VALUE: 20
PIF_VALUE: 20
PIF_VALUE: 21
PIF_VALUE: 20
PIF_VALUE: 19
PIF_VALUE: 6
PIF_VALUE: 4
PIF_VALUE: 1
PIF_VALUE: 17
PIF_VALUE: 19
PIF_VALUE: 20
PIF_VALUE: 0
PIF_VALUE: 17
PIF_VALUE: 20
PIF_VALUE: 17
PIF_VALUE: 20
PIF_VALUE: 17
PIF_VALUE: 20
PIF_VALUE: 0
PIF_VALUE: 18
PIF_VALUE: 0
PIF_VALUE: 17
PIF_VALUE: 17
PIF_VALUE: 21
PIF_VALUE: 2
PIF_VALUE: 0
PIF_VALUE: 19
PIF_VALUE: 0
PIF_VALUE: 17
PIF_VALUE: 20
PIF_VALUE: 1
PIF_VALUE: 18
PIF_VALUE: 8
PIF_VALUE: 21
PIF_VALUE: 0

## 2020-04-30 ASSESSMENT — PAIN SCALES - GENERAL
PAINLEVEL_OUTOF10: 0

## 2020-04-30 ASSESSMENT — LIFESTYLE VARIABLES: SMOKING_STATUS: 0

## 2020-04-30 NOTE — PROGRESS NOTES
[facility] Progress Note      Name:  Jeane Dior Frances /Age/Sex: 1945  (75 y.o. male)   MRN & CSN:  [id number] & [id number] Admission Date/Time: 2020  6:39 PM   Location:  1111/1111-A PCP: Tom Correa 57 Day: 4    Assessment and Plan:   Laxmi Roman is a 75 y.o.  male  who presents with jaundice    Recurrent hyperbilirubinemia   CBD dilation     Likely from recurrent CBD stone, Previous incident was in January T-Bili as high as 10  MRCP confirms filling defects in CBD with mild CBD dilation   GI following and Surgery consulted   Plan ERCP and stone extraction  and possible Lap-colin     Hypertension - controled with amlodipine,     Hyperlipidemia - on Lipitor     Hx of PE S/p IVC filter placement     Continue with Coumadin per pharmacy     S/p BKA     Incidental pulmonary nodule on CT abdomen - check CT chest     Diet Diet NPO Effective Now Exceptions are: Sips with Meds, Ice Chips   DVT Prophylaxis [] Lovenox, []  Heparin, [] SCDs, []No VTE prophylaxis, patient ambulating   GI Prophylaxis [] PPI, [] H2 Blocker, [] No GI prophylaxis, patient is receiving diet/Tube Feeds   Code Status Full Code   Disposition Patient requires continued admission due to Jaundice   MDM [] Low, [x] Moderate,[]  High     History of Present Illness: Subjective     Patient Seen & Examined at the bedside      Patient is resting in bed with no distress -denies any abdominal pain - has loose watery stool which was large - last night   Remained afebrile      Ten point ROS reviewed negative, unless as noted above    Objective: Intake/Output Summary (Last 24 hours) at 2020 1019  Last data filed at 2020 0728  Gross per 24 hour   Intake 250 ml   Output 1200 ml   Net -950 ml      Vitals:   Vitals:    20 0903   BP: 122/68   Pulse: 70   Resp: 16   Temp: 97.4 °F (36.3 °C)   SpO2: 94%     Physical Exam:    GEN Awake male, resting in bed in no apparent distress.

## 2020-04-30 NOTE — OP NOTE
GENERAL SURGERY OPERATIVE NOTE  [facility]    PATIENT: Shelby Westfall 1945   MRN: [id number]    DATE: 4/30/2020    SURGEON: Jeremy Brandon MD    CASE ID: [id number]     PROCEDURE(S) PERFORMED:      ATTEMPTED ERCP DIAGNOSTIC, PROCEDURE ABORTED    PREOPERATIVE DIAGNOSIS:  choledocholithiasis, biliary obstruction    POSTOPERATIVE DIAGNOSIS:   same    INDICATIONS: Pagari 18. Renétie Parent is a 75 y.o. male presenting with jaundice and clinical findings consistent with choledocholithiasis for which ERCP has been discussed. The risks, benefits, potential complications and possible alternatives of the procedure were discussed in detail, including complications of but not limited to infection, bleeding, anesthesia-related complications, death, injury to surrounding structures, pain, inability to cannulate, perforation, pancreatitis, or need for additional interventions. All questions were answered. The patient wished to proceed and consent was documented in the medical record. FINDINGS:   Flat friable papilla. Procedure aborted due to friability, difficult cannulation. ANESTHESIA:   General endotracheal anesthesia  Anesthesiologist: Dorita Grossman MD  CRNA: SIDDHARTH Yoo - CRNA    STAFF:   Scrub Person First: Blanco Canseco RN    ESTIMATED BLOOD LOSS: Minimal    SPECIMEN(S):   * No specimens in log *    DRAINS & IMPLANTS:  * No implants in log *     COMPLICATIONS: none    DISPOSITION: PACU - hemodynamically stable. CONDITION: stable     PROCEDURE IN DETAIL:   Prior to beginning the procedure, informed consent was obtained and consent was documented in the medical record. The patient was brought to the endoscopy suite and positioned supine on the procedure table. Anesthesia was initiated and a time out was performed in which all were in agreement.  Appropriate perioperative antibiotics were administered and the patient was transitioned to left lateral decubitus positioning, taking care to pad pressure points. The radiographic  film was normal. Without a detailed examination of the pharynx, larynx, associated structures or upper GI tract due to the side-viewing optics of the duodenoscope, the esophagus was successfully intubated with the duodenoscope which passed into the stomach and trough the pylorus into the duodenum. The upper GI tract was grossly normal. The duodenoscope was advanced to the major papilla in the descending duodenum which appeared small and fairly flat. .     Cannulation: A wire first approach was used with a TrueTome 30mm 4.4F sphincterotome and a straight tip 0.035in Dreamwire. Selective cannulation was unable to be completed due to the flat papilla and friability of the tissues. The patient has a history of anticoagulation with Coumadin, and had 1 unit of FFP prior to starting the procedure for an INR of 1.69. There was no araceli bleeding during the procedure, but the papilla was very friable and the decision was made to abort further attempts to avoid causing bleeding or hematoma. Glucagon 0.5 mg was given to aid in cannulation. At the end of the procedure, all catheters and guidewires were removed. The endoscope was withdrawn from the patient and the insufflated CO2 gas was suctioned out. No biopsy or cytology specimen was collected for this exam. The patient tolerated the procedure well with no apparent complications and was awakened from anesthesia and transferred to PACU - hemodynamically stable. I was present and primarily performed all critical portions of the case.         Electronically signed:   Malachi Oliver MD 4/30/2020 12:30 PM

## 2020-04-30 NOTE — PLAN OF CARE
Discussed with the patient that the ERCP was not able to be completed due to difficult cannulation. We will plan to do an IOC during his laparoscopic cholecystectomy tomorrow and possibly remove the common bile duct stones via IOC instead. If that is not successful, we will discuss other options. Also, we will re-check his INR in the morning and transfuse FFP if it is >1.5. Continue to hold coumadin today. INR re-check after ERCP today was 1.53 per lab. Results not showing in Epic, but have resulted to the lab. He expressed understanding and agreement with this plan.      Electronically signed: Fransico Kanner, MD 4/30/2020 6:14 PM

## 2020-04-30 NOTE — ANESTHESIA PRE PROCEDURE
(IRON 325) tablet 325 mg  325 mg Oral Daily with breakfast Jesus BLOOM MD   325 mg at 04/30/20 0618    finasteride (PROSCAR) tablet 5 mg  5 mg Oral Daily Jesus BLOOM MD   5 mg at 04/29/20 1058    gabapentin (NEURONTIN) capsule 800 mg  800 mg Oral TID Srinivas Bright MD   800 mg at 04/29/20 2018    guaiFENesin [facility]) extended release tablet 1,200 mg  1,200 mg Oral BID Jesus BLOOM MD   1,200 mg at 04/29/20 2018    cetirizine (ZYRTEC) tablet 10 mg  10 mg Oral Daily Jesus BLOOM MD   10 mg at 04/29/20 1116    meclizine (ANTIVERT) tablet 12.5 mg  12.5 mg Oral BID Jesus BLOOM MD   12.5 mg at 04/29/20 2018    pantoprazole (PROTONIX) tablet 40 mg  40 mg Oral QAM AC Jesus BLOOM MD   40 mg at 04/30/20 0618    polyethylene glycol (GLYCOLAX) packet 17 g  17 g Oral Daily PRN Ankur Dejesus MD        tamsulosin (FLOMAX) capsule 0.4 mg  0.4 mg Oral Daily Jesus BLOOM MD   0.4 mg at 04/29/20 1116    sodium chloride flush 0.9 % injection 10 mL  10 mL Intravenous 2 times per day Srinivas Bright MD        sodium chloride flush 0.9 % injection 10 mL  10 mL Intravenous PRN Jesus BLOOM MD   10 mL at 04/28/20 0229    acetaminophen (TYLENOL) tablet 650 mg  650 mg Oral Q6H PRN Ankur Dejesus MD        Or    acetaminophen (TYLENOL) suppository 650 mg  650 mg Rectal Q6H PRN Ankur Dejesus MD        magnesium hydroxide (MILK OF MAGNESIA) 400 MG/5ML suspension 30 mL  30 mL Oral Daily PRN Ankur Dejesus MD        promethazine (PHENERGAN) tablet 12.5 mg  12.5 mg Oral Q6H PRN Ankur Dejesus MD        Or    ondansetron (ZOFRAN) injection 4 mg  4 mg Intravenous Q6H PRN Ankur Dejesus MD           Allergies:     Allergies   Allergen Reactions    Sulfa Antibiotics Other (See Comments)     unknown    Tramadol Other (See Comments)     Shaking       Problem List:    Patient Active Problem List   Diagnosis Code    Jaundice R17    Elevated liver enzymes R74.8    Choledocholithiasis with obstruction 2020 06:50 PM    CO2 25 2020 06:50 PM    BUN 10 2020 06:50 PM    CREATININE 0.6 (L) 2020 06:50 PM    GLUCOSE 153 (H) 2020 06:50 PM     COAGS  Lab Results   Component Value Date/Time    PROTIME 22.7 (H) 2020 06:50 PM    INR 1.86 2020 06:50 PM    APTT 63.8 (H) 2020 10:12 PM         Anesthesia Evaluation  Patient summary reviewed and Nursing notes reviewed  Airway:         Dental:          Pulmonary:                             ROS comment: Former smoker   Cardiovascular:    (+) hypertension:, CAD:, hyperlipidemia      ECG reviewed                     ROS comment: [id number]  Normal sinus rhythm with sinus arrhythmia   Left axis deviation   Minimal voltage criteria for LVH, may be normal variant   Possible Lateral infarct , age undetermined      Neuro/Psych:   (+) neuromuscular disease (peripheral neuropathy):, psychiatric history:depression/anxiety              ROS comment: HEENT:   Meniere's disease GI/Hepatic/Renal:   (+) GERD (on PPI):, hepatitis (jaundice, elevated LFT's. Hepatic steatosis):, renal disease (BPH, on flomax and proscar):,          ROS comment: choledocholithiasis, biliary obstruction    MRI ABDM   1. Slightly increased size of mild common bile duct dilation.  There are several filling defects within the mid to distal common bile duct the largest measuring up to 3 mm suggestive of choledocholithiasis. 2. Mild hepatic steatosis. 3. Cholelithiasis and gallbladder sludge with no other MR findings of acute  cholecystitis.       .   Endo/Other:    (+) : arthritis: OA., . Pt had no PAT visit        ROS comment: Right AKA Abdominal:           Vascular:   + PE (, IVC filter placed, on coumadin).                                      Anesthesia Plan         [address], APRN - CNP   2020

## 2020-05-01 ENCOUNTER — APPOINTMENT (OUTPATIENT)
Dept: GENERAL RADIOLOGY | Age: 75
DRG: 417 | End: 2020-05-01
Payer: MEDICARE

## 2020-05-01 ENCOUNTER — ANESTHESIA (OUTPATIENT)
Dept: OPERATING ROOM | Age: 75
DRG: 417 | End: 2020-05-01
Payer: MEDICARE

## 2020-05-01 VITALS
RESPIRATION RATE: 22 BRPM | DIASTOLIC BLOOD PRESSURE: 62 MMHG | TEMPERATURE: 98.6 F | OXYGEN SATURATION: 97 % | SYSTOLIC BLOOD PRESSURE: 87 MMHG

## 2020-05-01 LAB
ALBUMIN ELP: 3.7 GM/DL (ref 3.2–5.6)
ALPHA-1-GLOBULIN: 0.4 GM/DL (ref 0.1–0.4)
ALPHA-2-GLOBULIN: 0.6 GM/DL (ref 0.4–1.2)
ANTINUCLEAR ANTIBODY, HEP-2, IGG: DETECTED
BETA GLOBULIN: 1 GM/DL (ref 0.5–1.3)
GAMMA GLOBULIN: 1.4 GM/DL (ref 0.5–1.6)
INTERPRETATION: ABNORMAL
SPEP INTERPRETATION: NORMAL
TOTAL PROTEIN: 7.1 GM/DL (ref 6.4–8.2)

## 2020-05-01 PROCEDURE — 6360000002 HC RX W HCPCS: Performed by: ANESTHESIOLOGY

## 2020-05-01 PROCEDURE — 2580000003 HC RX 258: Performed by: ANESTHESIOLOGY

## 2020-05-01 PROCEDURE — 85610 PROTHROMBIN TIME: CPT

## 2020-05-01 PROCEDURE — 6360000002 HC RX W HCPCS: Performed by: SURGERY

## 2020-05-01 PROCEDURE — 3600000004 HC SURGERY LEVEL 4 BASE: Performed by: SURGERY

## 2020-05-01 PROCEDURE — 88304 TISSUE EXAM BY PATHOLOGIST: CPT

## 2020-05-01 PROCEDURE — 3700000000 HC ANESTHESIA ATTENDED CARE: Performed by: SURGERY

## 2020-05-01 PROCEDURE — 74300 X-RAY BILE DUCTS/PANCREAS: CPT | Performed by: SURGERY

## 2020-05-01 PROCEDURE — 6360000002 HC RX W HCPCS: Performed by: NURSE ANESTHETIST, CERTIFIED REGISTERED

## 2020-05-01 PROCEDURE — 2580000003 HC RX 258: Performed by: SURGERY

## 2020-05-01 PROCEDURE — 3600000014 HC SURGERY LEVEL 4 ADDTL 15MIN: Performed by: SURGERY

## 2020-05-01 PROCEDURE — 6360000002 HC RX W HCPCS: Performed by: INTERNAL MEDICINE

## 2020-05-01 PROCEDURE — 80053 COMPREHEN METABOLIC PANEL: CPT

## 2020-05-01 PROCEDURE — 94761 N-INVAS EAR/PLS OXIMETRY MLT: CPT

## 2020-05-01 PROCEDURE — 0FT44ZZ RESECTION OF GALLBLADDER, PERCUTANEOUS ENDOSCOPIC APPROACH: ICD-10-PCS | Performed by: SURGERY

## 2020-05-01 PROCEDURE — 2500000003 HC RX 250 WO HCPCS: Performed by: SURGERY

## 2020-05-01 PROCEDURE — 6370000000 HC RX 637 (ALT 250 FOR IP): Performed by: SURGERY

## 2020-05-01 PROCEDURE — 2500000003 HC RX 250 WO HCPCS: Performed by: NURSE ANESTHETIST, CERTIFIED REGISTERED

## 2020-05-01 PROCEDURE — 6360000004 HC RX CONTRAST MEDICATION: Performed by: SURGERY

## 2020-05-01 PROCEDURE — 2700000000 HC OXYGEN THERAPY PER DAY

## 2020-05-01 PROCEDURE — 7100000000 HC PACU RECOVERY - FIRST 15 MIN: Performed by: SURGERY

## 2020-05-01 PROCEDURE — 85730 THROMBOPLASTIN TIME PARTIAL: CPT

## 2020-05-01 PROCEDURE — 47563 LAPARO CHOLECYSTECTOMY/GRAPH: CPT | Performed by: SURGERY

## 2020-05-01 PROCEDURE — 6370000000 HC RX 637 (ALT 250 FOR IP): Performed by: INTERNAL MEDICINE

## 2020-05-01 PROCEDURE — 2709999900 HC NON-CHARGEABLE SUPPLY: Performed by: SURGERY

## 2020-05-01 PROCEDURE — BF101ZZ FLUOROSCOPY OF BILE DUCTS USING LOW OSMOLAR CONTRAST: ICD-10-PCS | Performed by: SURGERY

## 2020-05-01 PROCEDURE — 1200000000 HC SEMI PRIVATE

## 2020-05-01 PROCEDURE — 3700000001 HC ADD 15 MINUTES (ANESTHESIA): Performed by: SURGERY

## 2020-05-01 PROCEDURE — 76000 FLUOROSCOPY <1 HR PHYS/QHP: CPT

## 2020-05-01 PROCEDURE — 7100000001 HC PACU RECOVERY - ADDTL 15 MIN: Performed by: SURGERY

## 2020-05-01 PROCEDURE — 86900 BLOOD TYPING SEROLOGIC ABO: CPT

## 2020-05-01 RX ORDER — SODIUM CHLORIDE 0.9 % (FLUSH) 0.9 %
10 SYRINGE (ML) INJECTION EVERY 12 HOURS SCHEDULED
Status: DISCONTINUED | OUTPATIENT
Start: 2020-05-01 | End: 2020-05-06 | Stop reason: HOSPADM

## 2020-05-01 RX ORDER — FENTANYL CITRATE 50 UG/ML
25 INJECTION, SOLUTION INTRAMUSCULAR; INTRAVENOUS EVERY 5 MIN PRN
Status: DISCONTINUED | OUTPATIENT
Start: 2020-05-01 | End: 2020-05-01 | Stop reason: HOSPADM

## 2020-05-01 RX ORDER — CEFAZOLIN SODIUM 2 G/50ML
2 SOLUTION INTRAVENOUS ONCE
Status: COMPLETED | OUTPATIENT
Start: 2020-05-01 | End: 2020-05-01

## 2020-05-01 RX ORDER — BUPIVACAINE HYDROCHLORIDE 5 MG/ML
INJECTION, SOLUTION EPIDURAL; INTRACAUDAL
Status: COMPLETED | OUTPATIENT
Start: 2020-05-01 | End: 2020-05-01

## 2020-05-01 RX ORDER — MORPHINE SULFATE 2 MG/ML
2 INJECTION, SOLUTION INTRAMUSCULAR; INTRAVENOUS
Status: DISCONTINUED | OUTPATIENT
Start: 2020-05-01 | End: 2020-05-02

## 2020-05-01 RX ORDER — LABETALOL 20 MG/4 ML (5 MG/ML) INTRAVENOUS SYRINGE
PRN
Status: DISCONTINUED | OUTPATIENT
Start: 2020-05-01 | End: 2020-05-01 | Stop reason: SDUPTHER

## 2020-05-01 RX ORDER — CEFAZOLIN SODIUM 1 G/3ML
2 INJECTION, POWDER, FOR SOLUTION INTRAMUSCULAR; INTRAVENOUS ONCE
Status: DISCONTINUED | OUTPATIENT
Start: 2020-05-01 | End: 2020-05-01 | Stop reason: SDUPTHER

## 2020-05-01 RX ORDER — ONDANSETRON 2 MG/ML
INJECTION INTRAMUSCULAR; INTRAVENOUS PRN
Status: DISCONTINUED | OUTPATIENT
Start: 2020-05-01 | End: 2020-05-01 | Stop reason: SDUPTHER

## 2020-05-01 RX ORDER — LABETALOL HYDROCHLORIDE 5 MG/ML
5 INJECTION, SOLUTION INTRAVENOUS EVERY 10 MIN PRN
Status: DISCONTINUED | OUTPATIENT
Start: 2020-05-01 | End: 2020-05-01 | Stop reason: HOSPADM

## 2020-05-01 RX ORDER — PROPOFOL 10 MG/ML
INJECTION, EMULSION INTRAVENOUS PRN
Status: DISCONTINUED | OUTPATIENT
Start: 2020-05-01 | End: 2020-05-01 | Stop reason: SDUPTHER

## 2020-05-01 RX ORDER — FENTANYL CITRATE 50 UG/ML
INJECTION, SOLUTION INTRAMUSCULAR; INTRAVENOUS PRN
Status: DISCONTINUED | OUTPATIENT
Start: 2020-05-01 | End: 2020-05-01 | Stop reason: SDUPTHER

## 2020-05-01 RX ORDER — ONDANSETRON 2 MG/ML
4 INJECTION INTRAMUSCULAR; INTRAVENOUS
Status: DISCONTINUED | OUTPATIENT
Start: 2020-05-01 | End: 2020-05-01 | Stop reason: HOSPADM

## 2020-05-01 RX ORDER — FENTANYL CITRATE 50 UG/ML
25 INJECTION, SOLUTION INTRAMUSCULAR; INTRAVENOUS EVERY 5 MIN PRN
Status: COMPLETED | OUTPATIENT
Start: 2020-05-01 | End: 2020-05-01

## 2020-05-01 RX ORDER — ALBUTEROL SULFATE 2.5 MG/3ML
SOLUTION RESPIRATORY (INHALATION)
Status: COMPLETED
Start: 2020-05-01 | End: 2020-05-01

## 2020-05-01 RX ORDER — FENTANYL CITRATE 50 UG/ML
50 INJECTION, SOLUTION INTRAMUSCULAR; INTRAVENOUS EVERY 5 MIN PRN
Status: DISCONTINUED | OUTPATIENT
Start: 2020-05-01 | End: 2020-05-01 | Stop reason: HOSPADM

## 2020-05-01 RX ORDER — LIDOCAINE HYDROCHLORIDE 20 MG/ML
INJECTION, SOLUTION INTRAVENOUS PRN
Status: DISCONTINUED | OUTPATIENT
Start: 2020-05-01 | End: 2020-05-01 | Stop reason: SDUPTHER

## 2020-05-01 RX ORDER — ALBUTEROL SULFATE 2.5 MG/3ML
2.5 SOLUTION RESPIRATORY (INHALATION) ONCE
Status: COMPLETED | OUTPATIENT
Start: 2020-05-01 | End: 2020-05-01

## 2020-05-01 RX ORDER — SODIUM CHLORIDE, SODIUM LACTATE, POTASSIUM CHLORIDE, CALCIUM CHLORIDE 600; 310; 30; 20 MG/100ML; MG/100ML; MG/100ML; MG/100ML
INJECTION, SOLUTION INTRAVENOUS CONTINUOUS
Status: DISCONTINUED | OUTPATIENT
Start: 2020-05-01 | End: 2020-05-02

## 2020-05-01 RX ORDER — SODIUM CHLORIDE 9 MG/ML
INJECTION INTRAVENOUS
Status: COMPLETED | OUTPATIENT
Start: 2020-05-01 | End: 2020-05-01

## 2020-05-01 RX ORDER — SODIUM CHLORIDE 0.9 % (FLUSH) 0.9 %
10 SYRINGE (ML) INJECTION PRN
Status: DISCONTINUED | OUTPATIENT
Start: 2020-05-01 | End: 2020-05-06 | Stop reason: HOSPADM

## 2020-05-01 RX ORDER — HYDRALAZINE HYDROCHLORIDE 20 MG/ML
5 INJECTION INTRAMUSCULAR; INTRAVENOUS EVERY 10 MIN PRN
Status: DISCONTINUED | OUTPATIENT
Start: 2020-05-01 | End: 2020-05-01 | Stop reason: HOSPADM

## 2020-05-01 RX ORDER — OXYCODONE HYDROCHLORIDE AND ACETAMINOPHEN 5; 325 MG/1; MG/1
1 TABLET ORAL EVERY 4 HOURS PRN
Status: DISCONTINUED | OUTPATIENT
Start: 2020-05-01 | End: 2020-05-02

## 2020-05-01 RX ORDER — MORPHINE SULFATE 4 MG/ML
4 INJECTION, SOLUTION INTRAMUSCULAR; INTRAVENOUS
Status: DISCONTINUED | OUTPATIENT
Start: 2020-05-01 | End: 2020-05-02

## 2020-05-01 RX ORDER — ROCURONIUM BROMIDE 10 MG/ML
INJECTION, SOLUTION INTRAVENOUS PRN
Status: DISCONTINUED | OUTPATIENT
Start: 2020-05-01 | End: 2020-05-01 | Stop reason: SDUPTHER

## 2020-05-01 RX ORDER — OXYCODONE HYDROCHLORIDE AND ACETAMINOPHEN 5; 325 MG/1; MG/1
2 TABLET ORAL EVERY 4 HOURS PRN
Status: DISCONTINUED | OUTPATIENT
Start: 2020-05-01 | End: 2020-05-02

## 2020-05-01 RX ADMIN — BISACODYL 10 MG: 5 TABLET, COATED ORAL at 13:59

## 2020-05-01 RX ADMIN — GUAIFENESIN 1200 MG: 600 TABLET, EXTENDED RELEASE ORAL at 13:59

## 2020-05-01 RX ADMIN — AMLODIPINE BESYLATE 5 MG: 5 TABLET ORAL at 13:59

## 2020-05-01 RX ADMIN — ENOXAPARIN SODIUM 80 MG: 80 INJECTION SUBCUTANEOUS at 20:08

## 2020-05-01 RX ADMIN — CELECOXIB 200 MG: 200 CAPSULE ORAL at 13:58

## 2020-05-01 RX ADMIN — PHENYLEPHRINE HYDROCHLORIDE 100 MCG: 10 INJECTION INTRAVENOUS at 08:09

## 2020-05-01 RX ADMIN — PROPOFOL 150 MG: 10 INJECTION, EMULSION INTRAVENOUS at 08:03

## 2020-05-01 RX ADMIN — MORPHINE SULFATE 4 MG: 4 INJECTION, SOLUTION INTRAMUSCULAR; INTRAVENOUS at 14:36

## 2020-05-01 RX ADMIN — MORPHINE SULFATE 4 MG: 4 INJECTION, SOLUTION INTRAMUSCULAR; INTRAVENOUS at 20:01

## 2020-05-01 RX ADMIN — ONDANSETRON 4 MG: 2 INJECTION INTRAMUSCULAR; INTRAVENOUS at 09:37

## 2020-05-01 RX ADMIN — CETIRIZINE HYDROCHLORIDE 10 MG: 10 TABLET, FILM COATED ORAL at 14:00

## 2020-05-01 RX ADMIN — GUAIFENESIN 1200 MG: 600 TABLET, EXTENDED RELEASE ORAL at 20:08

## 2020-05-01 RX ADMIN — SODIUM CHLORIDE, PRESERVATIVE FREE 10 ML: 5 INJECTION INTRAVENOUS at 14:08

## 2020-05-01 RX ADMIN — FENTANYL CITRATE 25 MCG: 50 INJECTION INTRAMUSCULAR; INTRAVENOUS at 10:50

## 2020-05-01 RX ADMIN — SODIUM CHLORIDE, POTASSIUM CHLORIDE, SODIUM LACTATE AND CALCIUM CHLORIDE: 600; 310; 30; 20 INJECTION, SOLUTION INTRAVENOUS at 06:24

## 2020-05-01 RX ADMIN — FENTANYL CITRATE 50 MCG: 50 INJECTION INTRAMUSCULAR; INTRAVENOUS at 09:29

## 2020-05-01 RX ADMIN — TAMSULOSIN HYDROCHLORIDE 0.4 MG: 0.4 CAPSULE ORAL at 13:59

## 2020-05-01 RX ADMIN — FINASTERIDE 5 MG: 5 TABLET, FILM COATED ORAL at 14:00

## 2020-05-01 RX ADMIN — SUGAMMADEX 200 MG: 100 INJECTION, SOLUTION INTRAVENOUS at 09:44

## 2020-05-01 RX ADMIN — GABAPENTIN 800 MG: 400 CAPSULE ORAL at 20:08

## 2020-05-01 RX ADMIN — FERROUS SULFATE TAB 325 MG (65 MG ELEMENTAL FE) 325 MG: 325 (65 FE) TAB at 14:00

## 2020-05-01 RX ADMIN — ALBUTEROL SULFATE 2.5 MG: 2.5 SOLUTION RESPIRATORY (INHALATION) at 10:45

## 2020-05-01 RX ADMIN — ENOXAPARIN SODIUM 80 MG: 80 INJECTION SUBCUTANEOUS at 16:51

## 2020-05-01 RX ADMIN — MECLIZINE 12.5 MG: 12.5 TABLET ORAL at 20:07

## 2020-05-01 RX ADMIN — FENTANYL CITRATE 100 MCG: 50 INJECTION INTRAMUSCULAR; INTRAVENOUS at 08:03

## 2020-05-01 RX ADMIN — ATORVASTATIN CALCIUM 10 MG: 10 TABLET, FILM COATED ORAL at 13:59

## 2020-05-01 RX ADMIN — CEFAZOLIN SODIUM 2 G: 2 SOLUTION INTRAVENOUS at 08:07

## 2020-05-01 RX ADMIN — LIDOCAINE HYDROCHLORIDE 100 MG: 20 INJECTION, SOLUTION INTRAVENOUS at 08:03

## 2020-05-01 RX ADMIN — OXYCODONE HYDROCHLORIDE AND ACETAMINOPHEN 2 TABLET: 5; 325 TABLET ORAL at 15:39

## 2020-05-01 RX ADMIN — ROCURONIUM BROMIDE 50 MG: 10 INJECTION INTRAVENOUS at 08:03

## 2020-05-01 RX ADMIN — Medication 5000 UNITS: at 13:58

## 2020-05-01 RX ADMIN — FENTANYL CITRATE 50 MCG: 50 INJECTION INTRAMUSCULAR; INTRAVENOUS at 08:32

## 2020-05-01 RX ADMIN — MECLIZINE 12.5 MG: 12.5 TABLET ORAL at 13:58

## 2020-05-01 RX ADMIN — GABAPENTIN 800 MG: 400 CAPSULE ORAL at 16:51

## 2020-05-01 RX ADMIN — SODIUM CHLORIDE, PRESERVATIVE FREE 10 ML: 5 INJECTION INTRAVENOUS at 20:03

## 2020-05-01 RX ADMIN — FENTANYL CITRATE 25 MCG: 50 INJECTION INTRAMUSCULAR; INTRAVENOUS at 10:35

## 2020-05-01 RX ADMIN — AMITRIPTYLINE HYDROCHLORIDE 25 MG: 25 TABLET, FILM COATED ORAL at 20:01

## 2020-05-01 RX ADMIN — PANTOPRAZOLE SODIUM 40 MG: 40 TABLET, DELAYED RELEASE ORAL at 06:24

## 2020-05-01 RX ADMIN — FENTANYL CITRATE 25 MCG: 50 INJECTION INTRAMUSCULAR; INTRAVENOUS at 10:55

## 2020-05-01 RX ADMIN — HYDRALAZINE HYDROCHLORIDE 5 MG: 20 INJECTION INTRAMUSCULAR; INTRAVENOUS at 10:35

## 2020-05-01 RX ADMIN — GABAPENTIN 800 MG: 400 CAPSULE ORAL at 13:59

## 2020-05-01 RX ADMIN — SODIUM CHLORIDE, POTASSIUM CHLORIDE, SODIUM LACTATE AND CALCIUM CHLORIDE: 600; 310; 30; 20 INJECTION, SOLUTION INTRAVENOUS at 19:50

## 2020-05-01 RX ADMIN — FENTANYL CITRATE 25 MCG: 50 INJECTION INTRAMUSCULAR; INTRAVENOUS at 10:45

## 2020-05-01 RX ADMIN — LABETALOL 20 MG/4 ML (5 MG/ML) INTRAVENOUS SYRINGE 5 MG: at 08:37

## 2020-05-01 ASSESSMENT — PAIN SCALES - GENERAL
PAINLEVEL_OUTOF10: 9
PAINLEVEL_OUTOF10: 3
PAINLEVEL_OUTOF10: 6
PAINLEVEL_OUTOF10: 8
PAINLEVEL_OUTOF10: 6
PAINLEVEL_OUTOF10: 4
PAINLEVEL_OUTOF10: 8
PAINLEVEL_OUTOF10: 0
PAINLEVEL_OUTOF10: 7
PAINLEVEL_OUTOF10: 6
PAINLEVEL_OUTOF10: 6
PAINLEVEL_OUTOF10: 8
PAINLEVEL_OUTOF10: 8
PAINLEVEL_OUTOF10: 0

## 2020-05-01 ASSESSMENT — PULMONARY FUNCTION TESTS
PIF_VALUE: 28
PIF_VALUE: 27
PIF_VALUE: 19
PIF_VALUE: 28
PIF_VALUE: 18
PIF_VALUE: 28
PIF_VALUE: 28
PIF_VALUE: 29
PIF_VALUE: 28
PIF_VALUE: 27
PIF_VALUE: 28
PIF_VALUE: 19
PIF_VALUE: 28
PIF_VALUE: 27
PIF_VALUE: 27
PIF_VALUE: 19
PIF_VALUE: 1
PIF_VALUE: 28
PIF_VALUE: 12
PIF_VALUE: 20
PIF_VALUE: 28
PIF_VALUE: 29
PIF_VALUE: 33
PIF_VALUE: 28
PIF_VALUE: 1
PIF_VALUE: 1
PIF_VALUE: 27
PIF_VALUE: 13
PIF_VALUE: 28
PIF_VALUE: 19
PIF_VALUE: 1
PIF_VALUE: 1
PIF_VALUE: 17
PIF_VALUE: 29
PIF_VALUE: 28
PIF_VALUE: 28
PIF_VALUE: 19
PIF_VALUE: 18
PIF_VALUE: 27
PIF_VALUE: 28
PIF_VALUE: 19
PIF_VALUE: 3
PIF_VALUE: 28
PIF_VALUE: 28
PIF_VALUE: 29
PIF_VALUE: 28
PIF_VALUE: 19
PIF_VALUE: 28
PIF_VALUE: 12
PIF_VALUE: 27
PIF_VALUE: 0
PIF_VALUE: 29
PIF_VALUE: 13
PIF_VALUE: 28
PIF_VALUE: 27
PIF_VALUE: 27
PIF_VALUE: 22
PIF_VALUE: 19
PIF_VALUE: 30
PIF_VALUE: 28
PIF_VALUE: 27
PIF_VALUE: 27
PIF_VALUE: 28
PIF_VALUE: 28
PIF_VALUE: 12
PIF_VALUE: 4
PIF_VALUE: 19
PIF_VALUE: 28
PIF_VALUE: 19
PIF_VALUE: 28
PIF_VALUE: 19
PIF_VALUE: 28
PIF_VALUE: 28
PIF_VALUE: 19
PIF_VALUE: 28
PIF_VALUE: 28
PIF_VALUE: 0
PIF_VALUE: 29
PIF_VALUE: 1
PIF_VALUE: 16
PIF_VALUE: 1
PIF_VALUE: 1
PIF_VALUE: 28
PIF_VALUE: 19
PIF_VALUE: 17
PIF_VALUE: 28
PIF_VALUE: 29
PIF_VALUE: 19
PIF_VALUE: 17
PIF_VALUE: 28
PIF_VALUE: 20
PIF_VALUE: 28
PIF_VALUE: 28
PIF_VALUE: 23
PIF_VALUE: 13
PIF_VALUE: 19
PIF_VALUE: 28
PIF_VALUE: 19

## 2020-05-01 ASSESSMENT — PAIN DESCRIPTION - PROGRESSION
CLINICAL_PROGRESSION: GRADUALLY IMPROVING
CLINICAL_PROGRESSION: GRADUALLY IMPROVING

## 2020-05-01 ASSESSMENT — PAIN DESCRIPTION - PAIN TYPE
TYPE: SURGICAL PAIN

## 2020-05-01 ASSESSMENT — PAIN - FUNCTIONAL ASSESSMENT: PAIN_FUNCTIONAL_ASSESSMENT: PREVENTS OR INTERFERES SOME ACTIVE ACTIVITIES AND ADLS

## 2020-05-01 ASSESSMENT — PAIN DESCRIPTION - LOCATION
LOCATION: ABDOMEN

## 2020-05-01 ASSESSMENT — PAIN DESCRIPTION - DESCRIPTORS: DESCRIPTORS: ACHING;SORE

## 2020-05-01 ASSESSMENT — PAIN DESCRIPTION - FREQUENCY: FREQUENCY: INTERMITTENT

## 2020-05-01 NOTE — PROGRESS NOTES
GENERAL SURGERY INPATIENT POST-OP NOTE  [facility]) [facility]    PATIENT: Kelsy Medeiros, 75 y.o., male, MRN: [id number]    Hospital Day:  LOS: 3 days , Post-Op Day: Day of Surgery    Procedure(s):   20 attempted ERCP (unable to cannulate due to flat/friable papilla)  20 laparoscopic cholecystectomy with Lis Regan is a 75 y.o. male who presented with obstructive jaundice and MRCP findings of choledocholithiasis. Subjective:  Chief Complaint: abdominal pain  Pain: 5/10  BM: none since surgery   Diet: DIET CLEAR LIQUID;  Activity: as tolerated    He has been doing well since surgery today. He has some abdominal pain, but is improving with medication. Denies nausea or vomiting. Surgical drain in place with ~10ml output since surgery. Objective:    Vitals: BP (!) 196/81   Pulse 73   Temp 97.9 °F (36.6 °C) (Oral)   Resp 17   Ht 5' 10\" (1.778 m)   Wt 193 lb 4 oz (87.7 kg)   SpO2 94%   BMI 27.73 kg/m²   Vital Signs (Last 24 Hours)  Temp  Av.3 °F (36.8 °C)  Min: 97.2 °F (36.2 °C)  Max: 98.6 °F (37 °C)  Pulse  Av.7  Min: 51  Max: 76  BP  Min: 80/55  Max: 196/81  Resp  Av.1  Min: 1  Max: 28  SpO2  Av.1 %  Min: 90 %  Max: 100 %  Wt Readings from Last 3 Encounters:   20 193 lb 4 oz (87.7 kg)   20 178 lb (80.7 kg)   20 197 lb (89.4 kg)       I/O:  1501 -  1500  In: 220 [P.O.:120;  I.V.:100]  Out: 735 [Urine:700; Drains:10]    IV Fluids:   lactated ringers Last Rate: 100 mL/hr at 20 3244    Scheduled Meds: sodium chloride flush, 10 mL, Intravenous, 2 times per day    enoxaparin, 1 mg/kg (Adjusted), Subcutaneous, BID    0.9 % sodium chloride, 250 mL, Intravenous, Once    0.9 % sodium chloride, 250 mL, Intravenous, Once    celecoxib, 200 mg, Oral, Daily    sodium chloride flush, 10 mL, Intravenous, 2 times per day    amitriptyline, 25 mg, Oral, Nightly    amLODIPine, 5 mg, Oral, Daily    atorvastatin, 10 mg, Oral,

## 2020-05-01 NOTE — PLAN OF CARE
Problem: Falls - Risk of:  Goal: Will remain free from falls  Description: Will remain free from falls  5/1/2020 1147 by Sujatha Tejada RN  Outcome: Ongoing  5/1/2020 0213 by Carlos Sevilla RN  Outcome: Ongoing  Goal: Absence of physical injury  Description: Absence of physical injury  5/1/2020 1147 by Sujatha Tejada RN  Outcome: Ongoing  5/1/2020 0213 by Carlos Sevilla RN  Outcome: Ongoing     Problem: Pain:  Description: Pain management should include both nonpharmacologic and pharmacologic interventions.   Goal: Pain level will decrease  Description: Pain level will decrease  5/1/2020 1147 by Sujatha Tejada RN  Outcome: Ongoing  5/1/2020 0213 by Carlos Sevilla RN  Outcome: Ongoing  Goal: Control of acute pain  Description: Control of acute pain  5/1/2020 1147 by Sujtaha Tejada RN  Outcome: Ongoing  5/1/2020 0213 by Carlos Sevilla RN  Outcome: Ongoing  Goal: Control of chronic pain  Description: Control of chronic pain  5/1/2020 1147 by Sujatha Tejada RN  Outcome: Ongoing  5/1/2020 0213 by Carlos Sevilla RN  Outcome: Ongoing

## 2020-05-01 NOTE — ANESTHESIA PRE PROCEDURE
Department of Anesthesiology  Preprocedure Note       Name:  Cuba Rondoner   Age:  75 y.o.  :  1945                                          MRN:  [id number]         Date:  2020      Surgeon: Pat Almaguer):  Behzad Hinds MD    Procedure: CHOLECYSTECTOMY LAPAROSCOPIC WITH IOC (N/A Abdomen)    Medications prior to admission:   Prior to Admission medications    Medication Sig Start Date End Date Taking? Authorizing Provider   warfarin (COUMADIN) 2.5 MG tablet Take 6.5 mg by mouth daily    Historical Provider, MD   amitriptyline (ELAVIL) 25 MG tablet Take 25 mg by mouth nightly    Historical Provider, MD   amLODIPine (NORVASC) 5 MG tablet Take 5 mg by mouth daily    Historical Provider, MD   atorvastatin (LIPITOR) 10 MG tablet Take 10 mg by mouth daily    Historical Provider, MD   bisacodyl (DULCOLAX) 5 MG EC tablet Take 10 mg by mouth daily    Historical Provider, MD   celecoxib (CELEBREX) 200 MG capsule Take 200 mg by mouth daily    Historical Provider, MD   ferrous sulfate 325 (65 Fe) MG EC tablet Take 325 mg by mouth daily (with breakfast)    Historical Provider, MD   finasteride (PROSCAR) 5 MG tablet Take 5 mg by mouth daily    Historical Provider, MD   loratadine (CLARITIN) 10 MG capsule Take 10 mg by mouth daily    Historical Provider, MD   omeprazole (PRILOSEC) 20 MG delayed release capsule Take 20 mg by mouth Daily    Historical Provider, MD   tamsulosin (FLOMAX) 0.4 MG capsule Take 0.4 mg by mouth daily    Historical Provider, MD   Cholecalciferol (VITAMIN D3) 125 MCG (5000 UT) TABS Take 5,000 Units by mouth daily    Historical Provider, MD   meclizine (ANTIVERT) 12.5 MG tablet Take 12.5 mg by mouth 2 times daily    Historical Provider, MD   guaiFENesin (MUCINEX) 600 MG extended release tablet Take 1,200 mg by mouth 2 times daily    Historical Provider, MD   gabapentin (NEURONTIN) 400 MG capsule Take 800 mg by mouth 3 times daily.     Historical Provider, MD   Sodium Bicarbonate-Citric Acid 05/01/20 191 lb (86.6 kg)   01/31/20 178 lb (80.7 kg)   01/26/20 197 lb (89.4 kg)     Body mass index is 27.41 kg/m². CBC:   Lab Results   Component Value Date    WBC 4.7 04/27/2020    RBC 3.98 04/27/2020    HGB 12.7 04/27/2020    HCT 38.9 04/27/2020    MCV 97.7 04/27/2020    RDW 13.2 04/27/2020     04/27/2020       CMP:   Lab Results   Component Value Date     04/27/2020    K 4.3 04/27/2020     04/27/2020    CO2 25 04/27/2020    BUN 10 04/27/2020    CREATININE 0.6 04/27/2020    GFRAA >60 04/27/2020    LABGLOM >60 04/27/2020    GLUCOSE 153 04/27/2020    PROT 7.1 04/27/2020    CALCIUM 8.9 04/27/2020    BILITOT 3.4 04/27/2020    BILITOT 3.4 04/27/2020    ALKPHOS 100 04/27/2020    AST 28 04/27/2020    ALT 91 04/27/2020       POC Tests: No results for input(s): POCGLU, POCNA, POCK, POCCL, POCBUN, POCHEMO, POCHCT in the last 72 hours. Coags:   Lab Results   Component Value Date    PROTIME 22.7 04/27/2020    INR 1.86 04/27/2020    APTT 63.8 01/28/2020       HCG (If Applicable): No results found for: PREGTESTUR, PREGSERUM, HCG, HCGQUANT     ABGs: No results found for: PHART, PO2ART, TNO4RKU, XDV4ECB, BEART, F3UJQPMS     Type & Screen (If Applicable):  No results found for: LABABO, LABRH    Anesthesia Evaluation    Airway: Mallampati: IV  TM distance: >3 FB   Neck ROM: full  Mouth opening: < 3 FB Dental:    (+) upper dentures      Pulmonary:   (+) decreased breath sounds,                             Cardiovascular:    (+) hypertension:, CAD:,         Rhythm: regular                      Neuro/Psych:   (+) neuromuscular disease:,             GI/Hepatic/Renal:   (+) GERD:, morbid obesity         ROS comment: Cholelithiasis, obstructed  Failed ERCP  Plan IOC. Endo/Other:                     Abdominal:   (+) obese,         Vascular:                                        Anesthesia Plan      general     ASA 4       Induction: intravenous. MIPS: Postoperative opioids intended.   Anesthetic plan and risks discussed with patient. Plan discussed with CRNA.     Attending anesthesiologist reviewed and agrees with [address] Johnson MD   5/1/2020

## 2020-05-01 NOTE — DISCHARGE INSTR - COC
Continuity of Care Form    Patient Name: Alonso Saldana   :  1945  MRN:  [id number]    Admit date:  2020  Discharge date:  ***    Code Status Order: DNR-CCA   Advance Directives:   [facility] Documentation     Date/Time Healthcare Directive Type of Healthcare Directive Copy in [facility] [address] Agent's Name Healthcare Agent's Phone Number    20 1010  Yes, patient has an advance directive for healthcare treatment  --  --  --  --  --    20 0937  Yes, patient has an advance directive for healthcare treatment  --  --  --  --  --          Admitting Physician:  Zaina Ghosh MD  PCP: Nico Barclay DO    Discharging Nurse: [facility] Unit/Room#: 5226/1449-F  Discharging Unit Phone Number: ***    Emergency Contact:   Extended Emergency Contact Information  Primary Emergency Contact: Suyapa Miranda, [address] Phone: [phone]  Mobile Phone: [phone]  Relation: Other    Past Surgical History:  Past Surgical History:   Procedure Laterality Date    ABOVE KNEE AMPUTATION Right     APPENDECTOMY      CHOLECYSTECTOMY, LAPAROSCOPIC N/A 2020    CHOLECYSTECTOMY LAPAROSCOPIC WITH IOC performed by Camryn Dupree MD at [address] [place] [address]         Immunization History: There is no immunization history on file for this patient.     Active Problems:  Patient Active Problem List   Diagnosis Code    Jaundice R17    Elevated liver enzymes R74.8    Choledocholithiasis with obstruction K80.51    Cholelithiasis with obstruction K80.21       Isolation/Infection:   Isolation          No Isolation        Patient Infection Status     None to display          Nurse Assessment:  Last Vital Signs: BP (!) 196/81   Pulse 73   Temp 97.9 °F (36.6 °C) (Oral)   Resp 17   Ht 5' 10\" (1.778 m)   Wt 193 lb 4 oz (87.7 kg)   SpO2 94%   BMI 27.73 kg/m²     Last documented pain score (0-10 scale): Pain Level: 9  Last Weight:   Wt Readings from Last 1 Encounters:   05/01/20 193 lb 4 oz (87.7 kg)     Mental Status:  oriented and alert    IV Access:  - None    Nursing Mobility/ADLs:  Walking   Dependent  Transfer  Assisted  Bathing  Assisted  Dressing  Assisted  Toileting  Assisted  Feeding  [facility] Delivery   whole    Wound Care Documentation and Therapy:        Elimination:  Continence:   · Bowel: Yes  · Bladder: Yes  Urinary Catheter: None   Colostomy/Ileostomy/Ileal Conduit: No       Date of Last BM: unk    Intake/Output Summary (Last 24 hours) at 5/1/2020 1534  Last data filed at 5/1/2020 1515  Gross per 24 hour   Intake 340 ml   Output 735 ml   Net -395 ml     I/O last 3 completed shifts: In: 220 [P.O.:120; I.V.:100]  Out: 65 [Urine:700; Drains:10; Blood:25]    Safety Concerns: At Risk for Falls    Impairments/Disabilities:      Hearing and Amputation - R AKA    Patient's personal belongings (please select all that are sent with patient):  Glasses, Dentures partial plate upper    RN SIGNATURE:  Electronically signed by Yina Shaw RN on 5/6/20 at 1:44 PM EDT    CASE MANAGEMENT/SOCIAL WORK SECTION    Inpatient Status Date: ***    Readmission Risk Assessment Score:  Readmission Risk              Risk of Unplanned Readmission:        13           Discharging to Facility/ Agency   · Name:   · Address:  · Phone:  · Fax:    Dialysis Facility (if applicable)   · Name:  · Address:  · Dialysis Schedule:  · Phone:  · Fax:    / signature: {Esignature:[id number]}    PHYSICIAN SECTION    Prognosis: Good    Condition at Discharge: Stable    Rehab Potential (if transferring to Rehab): Good    Recommended Labs or Other Treatments After Discharge:  Follow up with GI and Surgery in 2 weeks    Physician Certification: I certify the above information and transfer of Nick Contreras  is necessary for the continuing treatment of the diagnosis listed and that he requires Nic Sandhu for

## 2020-05-01 NOTE — PROGRESS NOTES
1006- pt arrived from OR, monitors applied. Report received from [facility] and [address] [place]. Pt drowsy from anesthesia. Abdominal incision sites well approximated with surgical glue dry and intact. CLARENCE draining smallamount brown/green bile tinged in appearance.  at bedside and aware. 1025-pt to room [address] at bedside to assume primary care.

## 2020-05-01 NOTE — OP NOTE
in log *     COMPLICATIONS: none    DISPOSITION: PACU - hemodynamically stable. CONDITION: stable     PROCEDURE IN DETAIL:  Prior to beginning the procedure, informed consent was obtained and consent was documented in the medical record. The patient was brought to the operating room and positioned supine on the operating table. Anesthesia was initiated and a time out was performed in which all were in agreement. Appropriate perioperative antibiotics were administered. The intended trocar sites were anesthetized with 0.5% Marcaine. A Veress needle was used to enter the peritoneum in the right upper quadrant. A saline drop test showed rapid drainage through the needle, through which the abdomen was then insufflated to 15mmHg. A 5mm Visiport trocar was used to enter the abdomen in the right upper quadrant. There was no apparent injury. Subsequent 11mm subxiphoid and 5mm periumbilical trocar sites were placed under laparoscopic visualization. An additional 5mm trocar was also placed in the lateral right upper quadrant. The gallbladder was identified, and appeared distended with mild chronic appearing thickening. There were mild adhesions of the omentum to the falciform ligament which were carefully taken down. The gallbladder was grasped and retracted toward the right upper quadrant. After a careful dissection the triangle of the Calot was identified. The cystic duct and artery were identified. Dissection was continued until a critical view of safety was achieved. Attention was then turned to an intraoperative cholangiogram. A clip was placed on the cystic duct near the neck of the gallbladder, and a small opening was made in the cystic duct below the clip with laparoscopic scissors. Return of bile and sludge was noted. There was no purulence. A Marilu cholangiocatheter was introduced into the cystic duct and passed easily. The balloon was inflated and a cholangiogram performed.  The hepatic

## 2020-05-02 ENCOUNTER — APPOINTMENT (OUTPATIENT)
Dept: GENERAL RADIOLOGY | Age: 75
DRG: 417 | End: 2020-05-02
Payer: MEDICARE

## 2020-05-02 LAB
ALBUMIN SERPL-MCNC: 3.9 GM/DL (ref 3.4–5)
ALBUMIN SERPL-MCNC: 3.9 GM/DL (ref 3.4–5)
ALP BLD-CCNC: 107 IU/L (ref 40–128)
ALP BLD-CCNC: 107 IU/L (ref 40–129)
ALT SERPL-CCNC: 44 U/L (ref 10–40)
ALT SERPL-CCNC: 44 U/L (ref 10–40)
ANA PATTERN: NORMAL
ANA TITER: NORMAL
ANION GAP SERPL CALCULATED.3IONS-SCNC: 15 MMOL/L (ref 4–16)
APTT: 66.8 SECONDS (ref 25.1–37.1)
AST SERPL-CCNC: 30 IU/L (ref 15–37)
AST SERPL-CCNC: 30 IU/L (ref 15–37)
BASOPHILS ABSOLUTE: 0 K/CU MM
BASOPHILS RELATIVE PERCENT: 0.1 % (ref 0–1)
BILIRUB SERPL-MCNC: 2.7 MG/DL (ref 0–1)
BILIRUB SERPL-MCNC: 2.7 MG/DL (ref 0–1)
BILIRUBIN DIRECT: 1.2 MG/DL (ref 0–0.3)
BILIRUBIN, INDIRECT: 1.5 MG/DL (ref 0–0.7)
BUN BLDV-MCNC: 10 MG/DL (ref 6–23)
CALCIUM SERPL-MCNC: 9 MG/DL (ref 8.3–10.6)
CHLORIDE BLD-SCNC: 98 MMOL/L (ref 99–110)
CO2: 24 MMOL/L (ref 21–32)
CREAT SERPL-MCNC: 0.7 MG/DL (ref 0.9–1.3)
DIFFERENTIAL TYPE: ABNORMAL
EOSINOPHILS ABSOLUTE: 0 K/CU MM
EOSINOPHILS RELATIVE PERCENT: 0 % (ref 0–3)
GFR AFRICAN AMERICAN: >60 ML/MIN/1.73M2
GFR NON-AFRICAN AMERICAN: >60 ML/MIN/1.73M2
GLUCOSE BLD-MCNC: 178 MG/DL (ref 70–99)
HCT VFR BLD CALC: 40.4 % (ref 42–52)
HEMOGLOBIN: 14.3 GM/DL (ref 13.5–18)
IMMATURE NEUTROPHIL %: 0.7 % (ref 0–0.43)
INR BLD: 1.38 INDEX
LIPASE: 13 IU/L (ref 13–60)
LYMPHOCYTES ABSOLUTE: 0.6 K/CU MM
LYMPHOCYTES RELATIVE PERCENT: 3.7 % (ref 24–44)
MAGNESIUM: 1.9 MG/DL (ref 1.8–2.4)
MCH RBC QN AUTO: 36.9 PG (ref 27–31)
MCHC RBC AUTO-ENTMCNC: 35.4 % (ref 32–36)
MCV RBC AUTO: 104.1 FL (ref 78–100)
MONOCYTES ABSOLUTE: 0.5 K/CU MM
MONOCYTES RELATIVE PERCENT: 3.4 % (ref 0–4)
NUCLEATED RBC %: 0 %
PDW BLD-RTO: 16.3 % (ref 11.7–14.9)
PLATELET # BLD: 253 K/CU MM (ref 140–440)
PMV BLD AUTO: 10.6 FL (ref 7.5–11.1)
POTASSIUM SERPL-SCNC: 4.9 MMOL/L (ref 3.5–5.1)
PROTHROMBIN TIME: 16.8 SECONDS (ref 11.7–14.5)
RBC # BLD: 3.88 M/CU MM (ref 4.6–6.2)
SEGMENTED NEUTROPHILS ABSOLUTE COUNT: 13.6 K/CU MM
SEGMENTED NEUTROPHILS RELATIVE PERCENT: 92.1 % (ref 36–66)
SODIUM BLD-SCNC: 137 MMOL/L (ref 135–145)
TOTAL IMMATURE NEUTOROPHIL: 0.1 K/CU MM
TOTAL NUCLEATED RBC: 0 K/CU MM
TOTAL PROTEIN: 6.4 GM/DL (ref 6.4–8.2)
TOTAL PROTEIN: 6.4 GM/DL (ref 6.4–8.2)
WBC # BLD: 14.8 K/CU MM (ref 4–10.5)

## 2020-05-02 PROCEDURE — 6370000000 HC RX 637 (ALT 250 FOR IP): Performed by: SURGERY

## 2020-05-02 PROCEDURE — 2700000000 HC OXYGEN THERAPY PER DAY

## 2020-05-02 PROCEDURE — 1200000000 HC SEMI PRIVATE

## 2020-05-02 PROCEDURE — 83735 ASSAY OF MAGNESIUM: CPT

## 2020-05-02 PROCEDURE — 71045 X-RAY EXAM CHEST 1 VIEW: CPT

## 2020-05-02 PROCEDURE — 80048 BASIC METABOLIC PNL TOTAL CA: CPT

## 2020-05-02 PROCEDURE — 85610 PROTHROMBIN TIME: CPT

## 2020-05-02 PROCEDURE — 6360000002 HC RX W HCPCS: Performed by: SURGERY

## 2020-05-02 PROCEDURE — 2580000003 HC RX 258: Performed by: SURGERY

## 2020-05-02 PROCEDURE — 80053 COMPREHEN METABOLIC PANEL: CPT

## 2020-05-02 PROCEDURE — 83690 ASSAY OF LIPASE: CPT

## 2020-05-02 PROCEDURE — 85025 COMPLETE CBC W/AUTO DIFF WBC: CPT

## 2020-05-02 PROCEDURE — 94761 N-INVAS EAR/PLS OXIMETRY MLT: CPT

## 2020-05-02 PROCEDURE — 82248 BILIRUBIN DIRECT: CPT

## 2020-05-02 PROCEDURE — 99232 SBSQ HOSP IP/OBS MODERATE 35: CPT | Performed by: SURGERY

## 2020-05-02 PROCEDURE — 2580000003 HC RX 258: Performed by: SPECIALIST

## 2020-05-02 PROCEDURE — 2580000003 HC RX 258: Performed by: INTERNAL MEDICINE

## 2020-05-02 PROCEDURE — 6360000002 HC RX W HCPCS: Performed by: SPECIALIST

## 2020-05-02 PROCEDURE — 85730 THROMBOPLASTIN TIME PARTIAL: CPT

## 2020-05-02 RX ORDER — SODIUM CHLORIDE, SODIUM LACTATE, POTASSIUM CHLORIDE, CALCIUM CHLORIDE 600; 310; 30; 20 MG/100ML; MG/100ML; MG/100ML; MG/100ML
INJECTION, SOLUTION INTRAVENOUS CONTINUOUS
Status: DISCONTINUED | OUTPATIENT
Start: 2020-05-02 | End: 2020-05-04

## 2020-05-02 RX ORDER — HYDROCODONE BITARTRATE AND ACETAMINOPHEN 5; 325 MG/1; MG/1
2 TABLET ORAL EVERY 4 HOURS PRN
Status: DISCONTINUED | OUTPATIENT
Start: 2020-05-02 | End: 2020-05-06 | Stop reason: HOSPADM

## 2020-05-02 RX ORDER — SODIUM CHLORIDE 9 MG/ML
INJECTION, SOLUTION INTRAVENOUS CONTINUOUS
Status: DISCONTINUED | OUTPATIENT
Start: 2020-05-02 | End: 2020-05-02

## 2020-05-02 RX ORDER — DIPHENHYDRAMINE HYDROCHLORIDE 50 MG/ML
25 INJECTION INTRAMUSCULAR; INTRAVENOUS ONCE
Status: COMPLETED | OUTPATIENT
Start: 2020-05-02 | End: 2020-05-03

## 2020-05-02 RX ORDER — HYDROCODONE BITARTRATE AND ACETAMINOPHEN 5; 325 MG/1; MG/1
1 TABLET ORAL EVERY 4 HOURS PRN
Status: DISCONTINUED | OUTPATIENT
Start: 2020-05-02 | End: 2020-05-06 | Stop reason: HOSPADM

## 2020-05-02 RX ADMIN — ATORVASTATIN CALCIUM 10 MG: 10 TABLET, FILM COATED ORAL at 08:14

## 2020-05-02 RX ADMIN — GUAIFENESIN 1200 MG: 600 TABLET, EXTENDED RELEASE ORAL at 08:14

## 2020-05-02 RX ADMIN — MECLIZINE 12.5 MG: 12.5 TABLET ORAL at 21:51

## 2020-05-02 RX ADMIN — TAMSULOSIN HYDROCHLORIDE 0.4 MG: 0.4 CAPSULE ORAL at 08:16

## 2020-05-02 RX ADMIN — SODIUM CHLORIDE, PRESERVATIVE FREE 10 ML: 5 INJECTION INTRAVENOUS at 21:52

## 2020-05-02 RX ADMIN — AMLODIPINE BESYLATE 5 MG: 5 TABLET ORAL at 08:27

## 2020-05-02 RX ADMIN — GABAPENTIN 800 MG: 400 CAPSULE ORAL at 08:13

## 2020-05-02 RX ADMIN — PIPERACILLIN AND TAZOBACTAM 3.38 G: 3; .375 INJECTION, POWDER, LYOPHILIZED, FOR SOLUTION INTRAVENOUS at 16:20

## 2020-05-02 RX ADMIN — FERROUS SULFATE TAB 325 MG (65 MG ELEMENTAL FE) 325 MG: 325 (65 FE) TAB at 08:12

## 2020-05-02 RX ADMIN — CETIRIZINE HYDROCHLORIDE 10 MG: 10 TABLET, FILM COATED ORAL at 08:16

## 2020-05-02 RX ADMIN — SODIUM CHLORIDE, PRESERVATIVE FREE 10 ML: 5 INJECTION INTRAVENOUS at 08:35

## 2020-05-02 RX ADMIN — GUAIFENESIN 1200 MG: 600 TABLET, EXTENDED RELEASE ORAL at 21:52

## 2020-05-02 RX ADMIN — SODIUM CHLORIDE, POTASSIUM CHLORIDE, SODIUM LACTATE AND CALCIUM CHLORIDE: 600; 310; 30; 20 INJECTION, SOLUTION INTRAVENOUS at 16:19

## 2020-05-02 RX ADMIN — SODIUM CHLORIDE, PRESERVATIVE FREE 10 ML: 5 INJECTION INTRAVENOUS at 21:55

## 2020-05-02 RX ADMIN — PANTOPRAZOLE SODIUM 40 MG: 40 TABLET, DELAYED RELEASE ORAL at 07:05

## 2020-05-02 RX ADMIN — AMITRIPTYLINE HYDROCHLORIDE 25 MG: 25 TABLET, FILM COATED ORAL at 21:52

## 2020-05-02 RX ADMIN — SODIUM CHLORIDE: 9 INJECTION, SOLUTION INTRAVENOUS at 11:39

## 2020-05-02 RX ADMIN — GABAPENTIN 800 MG: 400 CAPSULE ORAL at 14:12

## 2020-05-02 RX ADMIN — Medication 5000 UNITS: at 08:16

## 2020-05-02 RX ADMIN — CELECOXIB 200 MG: 200 CAPSULE ORAL at 08:25

## 2020-05-02 RX ADMIN — ENOXAPARIN SODIUM 80 MG: 80 INJECTION SUBCUTANEOUS at 08:25

## 2020-05-02 RX ADMIN — FINASTERIDE 5 MG: 5 TABLET, FILM COATED ORAL at 08:13

## 2020-05-02 RX ADMIN — MECLIZINE 12.5 MG: 12.5 TABLET ORAL at 08:24

## 2020-05-02 RX ADMIN — GABAPENTIN 800 MG: 400 CAPSULE ORAL at 21:52

## 2020-05-02 ASSESSMENT — PAIN SCALES - GENERAL
PAINLEVEL_OUTOF10: 0
PAINLEVEL_OUTOF10: 7
PAINLEVEL_OUTOF10: 0

## 2020-05-02 NOTE — PROGRESS NOTES
Exam:  General: A&O x 3, no distress. HEENT: Anicteric sclerae, MMM. Extremities: No edema bilat LE. Abdomen: Soft, nontender, moderately distended. Moderately tender to palpation diffusely. Drain with bilious output -140cc this shift      Assessment and Plan:  75 y.o. male with choledocholithiasis. POD2 from lap cholecystectomy with IOC and lavage of his common bile duct after he was unable to have his papilla cannulated on ERCP. Bilirubin stable this morning but increasing bilious drainage is coming from his drain.     Patient Active Problem List:     Jaundice     Elevated liver enzymes     Choledocholithiasis with obstruction     Cholelithiasis with obstruction      - will need either ERCP or IR procedure for clearance of his duct and possible CBD stent  - GI input appreciated  - will try contacting Dr. Filiberto Andino for ERCP, if he is unavailable he may need transferred for ERCP    Yoana Gutierrez MD

## 2020-05-02 NOTE — PLAN OF CARE
Problem: Falls - Risk of:  Goal: Will remain free from falls  Description: Will remain free from falls  5/2/2020 1838 by Baltazar Victor RN  Outcome: Ongoing  5/2/2020 1837 by Baltazar Victor RN  Outcome: Ongoing  Goal: Absence of physical injury  Description: Absence of physical injury  5/2/2020 1838 by Baltazar Victor RN  Outcome: Ongoing  5/2/2020 1837 by Baltazar Victor RN  Outcome: Ongoing

## 2020-05-02 NOTE — PROGRESS NOTES
CLARENCE drain  Remained afebrile      Ten point ROS reviewed negative, unless as noted above    Objective: Intake/Output Summary (Last 24 hours) at 5/2/2020 1436  Last data filed at 5/2/2020 1424  Gross per 24 hour   Intake 1540 ml   Output 1230 ml   Net 310 ml      Vitals:   Vitals:    05/02/20 1424   BP: (!) 154/104   Pulse: 98   Resp: 18   Temp: 98.6 °F (37 °C)   SpO2: 93%     Physical Exam:    Exam:     General appearance: Well, no apparent distress, appears stated age and cooperative. HEENT Normal cephalic, atraumatic without obvious deformity. Pupils equal, round, and reactive to light. Extra ocular muscles intact. Conjunctivae/corneas clear. Neck: Supple, No jugular venous distention/bruits. Trachea midline without thyromegaly or adenopathy with full range of motion. Lungs: Clear to ascultation, bilaterally without Rales/Wheezes/Rhonchi with good respiratory effort. Mild resp distress, tachypneic, bibasilar crackes  Heart: tachycardic rate, regular rhythm with Normal S1/S2 without  murmurs, rubs or gallops, point of maximum impulse non-displaced  Abdomen: Soft, diffusely TTP, no rebound, bilious drainage from CLARENCE drain, non-distended without rigidity or guarding and positive bowel sounds all four quadrants. Extremities: No clubbing, cyanosis, or edema bilaterally. Full range of motion without deformity and normal gait intact. Skin: Skin color, texture, turgor normal. No rashes or lesions. Neurologic: Alert and oriented x3, neurovascularly intact with sensory/motor intact upper extremities/lower extremities, bilaterally.  Cranial nerves:II-XII intact, grossly non-focal.  Mental status: Alert, appropriate, pleasant        Medications:   Medications:    sodium chloride flush  10 mL Intravenous 2 times per day    0.9 % sodium chloride  250 mL Intravenous Once    0.9 % sodium chloride  250 mL Intravenous Once    celecoxib  200 mg Oral Daily    sodium chloride flush  10 mL Intravenous 2 times per day

## 2020-05-02 NOTE — CONSULTS
[address], [address] [place] [address]                                  CONSULTATION    PATIENT NAME: Andreea Rosario                :        1945  MED REC NO:   [id number]                          ROOM:       0286  ACCOUNT NO:   [de-identified]                           ADMIT DATE: 2020  PROVIDER:     Nighat Ansari MD    CONSULT DATE:  2020    PRIMARY CARE PROVIDER:  Bety Sánchez DO    CHIEF COMPLAINT:  Bile leak. HISTORY OF PRESENT ILLNESS:  The patient is a 71-year-old white  gentleman, who resides at [facility], with past medical history  significant for hypertension, hyperlipidemia, gastroesophageal reflux  disease, coronary artery disease, anxiety disorder, BPH, history of  pulmonary embolism, on Coumadin, polyneuropathy, who was referred to  [facility] from [facility] because of  abnormal LFTs and the patient noticed to be jaundiced. The patient,  however, did complain of mild back pain upon admission, but there is no  history of abdominal pain, nausea, vomiting, hematemesis, melena,  hematochezia, anorexia, or recent weight loss. Upon presentation to the  hospital, the patient had a liver profile drawn, which showed bilirubin  of 3.4, AST of 28, ALT of 91, alkaline phosphatase of 100. The patient  had a CAT scan done of the abdomen and pelvis on 2020 and was  noted to have gallstones with no evidence of acute cholecystitis or  biliary ductal dilatation, and then subsequently, MRCP was done on  2020 and again gallstones were noted, but slightly increased size  of the common bile duct was noted and there were several filling defects  within the [id number] common bile duct with the largest measuring  about 3 mm suggestive of choledocholithiasis. Mild hepatic steatosis  was noted along with gallstones and gallbladder sludge.   Of note, the  patient was admitted in 01/2020 also with abnormal LFTs and was seen by  Dr. Scout Bravo from Gastroenterology and had a CT scan done on 01/28/2020  and again there was no evidence of common bile duct dilatation; however,  gallstones were noted along with hiatal hernia and diffuse hepatic  steatosis, and MRCP was performed as an outpatient on 01/31/2020 and the  patient was noted to have gallstones without evidence of acute  cholecystitis or common bile duct stones. The impression was the  patient most likely passed the stone and his LFTs also improved  subsequently; however, the last set of LFTs on 02/01/2020 showed a total  bilirubin of 2.7, and AST of 35, ALT of 84, alkaline phosphatase of 176. The patient was seen by Dr. Scout Bravo in consultation again on 04/28/2020  and the impression was the patient has symptomatic gallstones along with  common bile duct stone. The patient was subsequently seen by the  surgical consultant, Dr. Eric Bautista, on 04/29/2020 and then on  04/30/2020, the patient underwent ERCP to clear the common bile duct of  the stone prior to lap cholecystectomy, but the common bile duct could  not be cannulated since the papilla was flat/friable. On 05/01/2020,  the patient underwent lap cholecystectomy by Dr. Eric Bautista and  had an intraoperative cholangiogram done, which showed sludge in the  common bile duct, which was removed and there was free drainage of the  dye from the common bile duct into the duodenum. After the surgery, CLARENCE  drain was left and the patient has been draining copious amount of dark  greenish bile through the CLARENCE, and since surgery, the patient has drained  approximately 200 mL of bile. The patient is also complaining of upper  abdominal pain, but no vomiting, hematemesis, fever, or chills.     The patient was seen by the surgical consultant, Dr. Rita Willoughby,  who was covering for Dr. Eric Bautista today, and requested me to  see the patient for possible ERCP to find out the etiology of the  patient's bile leak and place a biliary stent as well. The patient is  hemodynamically stable at present and is on a clear liquid diet. The  workup for the patient's LFTs including hepatitis A, B, and C serology  on 01/28/2020 was negative and antinuclear antibody on 04/28/2020 was  detected. Serum protein electrophoresis was unremarkable. REVIEW OF SYSTEMS:  CENTRAL NERVOUS SYSTEM:  The patient denies headache or focal  sensorimotor symptoms. CARDIOVASCULAR SYSTEM:  No history of chest pain, but the patient has  mild shortness of breath. GENITOURINARY SYSTEM:  No history of dysuria, pyuria, or hematuria. MUSCULOSKELETAL SYSTEM:  The patient complains of generalized weakness. RESPIRATORY SYSTEM:  The patient has mild cough and shortness of breath,  but no hemoptysis, fever, or chills. PAST MEDICAL HISTORY:  Significant for history of hypertension,  hyperlipidemia, gastroesophageal reflux disease, coronary artery  disease, BPH, anxiety disorder, pulmonary embolism, on Coumadin, which  has been withheld, polyneuropathy, and Meniere's disease. FAMILY HISTORY:  Noncontributory. MEDICATIONS:  Please refer to chart. SOCIOECONOMIC HISTORY:  No history of EtOH abuse. The patient is a  former smoker. PAST SURGICAL HISTORY:  The patient has had tonsillectomy done,  appendectomy, right above-knee amputation, and had ERCP done on  04/30/2020 to be followed by lap cholecystectomy with intraoperative  cholangiogram by Dr. Kari Moreno on 05/01/2020. ALLERGIES:  The patient is allergic to SULFA ANTIBIOTICS and TRAMADOL. PHYSICAL EXAMINATION:  GENERAL:  Shows a 59-year-old white gentleman of average build and fair  nutritional status for his age, who is lying flat in bed, in no acute  distress. He is awake, alert, and oriented, and pleasant to talk with. VITAL SIGNS:  Stable. HEENT:  Shows skull to be atraumatic. NECK:  Supple.   CHEST:  Shows diminished breath

## 2020-05-03 ENCOUNTER — APPOINTMENT (OUTPATIENT)
Dept: GENERAL RADIOLOGY | Age: 75
DRG: 417 | End: 2020-05-03
Payer: MEDICARE

## 2020-05-03 ENCOUNTER — ANESTHESIA EVENT (OUTPATIENT)
Dept: ENDOSCOPY | Age: 75
DRG: 417 | End: 2020-05-03
Payer: MEDICARE

## 2020-05-03 ENCOUNTER — ANESTHESIA (OUTPATIENT)
Dept: ENDOSCOPY | Age: 75
DRG: 417 | End: 2020-05-03
Payer: MEDICARE

## 2020-05-03 VITALS
DIASTOLIC BLOOD PRESSURE: 78 MMHG | RESPIRATION RATE: 1 BRPM | TEMPERATURE: 97.7 F | OXYGEN SATURATION: 94 % | SYSTOLIC BLOOD PRESSURE: 154 MMHG

## 2020-05-03 LAB
ALBUMIN SERPL-MCNC: 3.2 GM/DL (ref 3.4–5)
ALP BLD-CCNC: 91 IU/L (ref 40–128)
ALT SERPL-CCNC: 27 U/L (ref 10–40)
AMYLASE: 5 U/L (ref 25–115)
ANION GAP SERPL CALCULATED.3IONS-SCNC: 10 MMOL/L (ref 4–16)
APTT: 64.3 SECONDS (ref 25.1–37.1)
AST SERPL-CCNC: 17 IU/L (ref 15–37)
BASOPHILS ABSOLUTE: 0 K/CU MM
BASOPHILS RELATIVE PERCENT: 0.2 % (ref 0–1)
BILIRUB SERPL-MCNC: 3.1 MG/DL (ref 0–1)
BUN BLDV-MCNC: 11 MG/DL (ref 6–23)
CALCIUM SERPL-MCNC: 8.6 MG/DL (ref 8.3–10.6)
CHLORIDE BLD-SCNC: 104 MMOL/L (ref 99–110)
CO2: 27 MMOL/L (ref 21–32)
CREAT SERPL-MCNC: 0.6 MG/DL (ref 0.9–1.3)
DIFFERENTIAL TYPE: ABNORMAL
EOSINOPHILS ABSOLUTE: 0.1 K/CU MM
EOSINOPHILS RELATIVE PERCENT: 0.8 % (ref 0–3)
GFR AFRICAN AMERICAN: >60 ML/MIN/1.73M2
GFR NON-AFRICAN AMERICAN: >60 ML/MIN/1.73M2
GLUCOSE BLD-MCNC: 129 MG/DL (ref 70–99)
HCT VFR BLD CALC: 32.9 % (ref 42–52)
HEMOGLOBIN: 11.6 GM/DL (ref 13.5–18)
IMMATURE NEUTROPHIL %: 0.5 % (ref 0–0.43)
INR BLD: 1.41 INDEX
LACTATE: 1.4 MMOL/L (ref 0.4–2)
LIPASE: 13 IU/L (ref 13–60)
LYMPHOCYTES ABSOLUTE: 1 K/CU MM
LYMPHOCYTES RELATIVE PERCENT: 10.8 % (ref 24–44)
MAGNESIUM: 1.9 MG/DL (ref 1.8–2.4)
MCH RBC QN AUTO: 36.6 PG (ref 27–31)
MCHC RBC AUTO-ENTMCNC: 35.3 % (ref 32–36)
MCV RBC AUTO: 103.8 FL (ref 78–100)
MONOCYTES ABSOLUTE: 0.3 K/CU MM
MONOCYTES RELATIVE PERCENT: 2.7 % (ref 0–4)
NUCLEATED RBC %: 0 %
PDW BLD-RTO: 15.3 % (ref 11.7–14.9)
PLATELET # BLD: 188 K/CU MM (ref 140–440)
PMV BLD AUTO: 10.5 FL (ref 7.5–11.1)
POTASSIUM SERPL-SCNC: 3.9 MMOL/L (ref 3.5–5.1)
PROTHROMBIN TIME: 17.1 SECONDS (ref 11.7–14.5)
RBC # BLD: 3.17 M/CU MM (ref 4.6–6.2)
SEGMENTED NEUTROPHILS ABSOLUTE COUNT: 7.9 K/CU MM
SEGMENTED NEUTROPHILS RELATIVE PERCENT: 85 % (ref 36–66)
SODIUM BLD-SCNC: 141 MMOL/L (ref 135–145)
TOTAL IMMATURE NEUTOROPHIL: 0.05 K/CU MM
TOTAL NUCLEATED RBC: 0 K/CU MM
TOTAL PROTEIN: 5.5 GM/DL (ref 6.4–8.2)
WBC # BLD: 9.3 K/CU MM (ref 4–10.5)

## 2020-05-03 PROCEDURE — 80048 BASIC METABOLIC PNL TOTAL CA: CPT

## 2020-05-03 PROCEDURE — 0F798DZ DILATION OF COMMON BILE DUCT WITH INTRALUMINAL DEVICE, VIA NATURAL OR ARTIFICIAL OPENING ENDOSCOPIC: ICD-10-PCS | Performed by: SPECIALIST

## 2020-05-03 PROCEDURE — 99232 SBSQ HOSP IP/OBS MODERATE 35: CPT | Performed by: SURGERY

## 2020-05-03 PROCEDURE — 2580000003 HC RX 258: Performed by: SPECIALIST

## 2020-05-03 PROCEDURE — C2625 STENT, NON-COR, TEM W/DEL SY: HCPCS | Performed by: SPECIALIST

## 2020-05-03 PROCEDURE — 3609015100 HC ERCP STENT PLACEMENT BILIARY/PANCREATIC DUCT: Performed by: SPECIALIST

## 2020-05-03 PROCEDURE — 2500000003 HC RX 250 WO HCPCS: Performed by: NURSE ANESTHETIST, CERTIFIED REGISTERED

## 2020-05-03 PROCEDURE — 82150 ASSAY OF AMYLASE: CPT

## 2020-05-03 PROCEDURE — 7100000001 HC PACU RECOVERY - ADDTL 15 MIN: Performed by: SPECIALIST

## 2020-05-03 PROCEDURE — 83690 ASSAY OF LIPASE: CPT

## 2020-05-03 PROCEDURE — 85025 COMPLETE CBC W/AUTO DIFF WBC: CPT

## 2020-05-03 PROCEDURE — 1200000000 HC SEMI PRIVATE

## 2020-05-03 PROCEDURE — 6360000004 HC RX CONTRAST MEDICATION: Performed by: SPECIALIST

## 2020-05-03 PROCEDURE — 6360000002 HC RX W HCPCS: Performed by: NURSE ANESTHETIST, CERTIFIED REGISTERED

## 2020-05-03 PROCEDURE — 76000 FLUOROSCOPY <1 HR PHYS/QHP: CPT

## 2020-05-03 PROCEDURE — 6370000000 HC RX 637 (ALT 250 FOR IP): Performed by: SURGERY

## 2020-05-03 PROCEDURE — 6360000002 HC RX W HCPCS: Performed by: SPECIALIST

## 2020-05-03 PROCEDURE — 2709999900 HC NON-CHARGEABLE SUPPLY: Performed by: SPECIALIST

## 2020-05-03 PROCEDURE — 3700000000 HC ANESTHESIA ATTENDED CARE: Performed by: SPECIALIST

## 2020-05-03 PROCEDURE — 85730 THROMBOPLASTIN TIME PARTIAL: CPT

## 2020-05-03 PROCEDURE — 6360000002 HC RX W HCPCS: Performed by: PHYSICIAN ASSISTANT

## 2020-05-03 PROCEDURE — 2700000000 HC OXYGEN THERAPY PER DAY

## 2020-05-03 PROCEDURE — 2580000003 HC RX 258: Performed by: SURGERY

## 2020-05-03 PROCEDURE — 94761 N-INVAS EAR/PLS OXIMETRY MLT: CPT

## 2020-05-03 PROCEDURE — C1769 GUIDE WIRE: HCPCS | Performed by: SPECIALIST

## 2020-05-03 PROCEDURE — 85610 PROTHROMBIN TIME: CPT

## 2020-05-03 PROCEDURE — 6370000000 HC RX 637 (ALT 250 FOR IP): Performed by: SPECIALIST

## 2020-05-03 PROCEDURE — 80053 COMPREHEN METABOLIC PANEL: CPT

## 2020-05-03 PROCEDURE — 2720000010 HC SURG SUPPLY STERILE: Performed by: SPECIALIST

## 2020-05-03 PROCEDURE — 7100000000 HC PACU RECOVERY - FIRST 15 MIN: Performed by: SPECIALIST

## 2020-05-03 PROCEDURE — 83605 ASSAY OF LACTIC ACID: CPT

## 2020-05-03 PROCEDURE — 83735 ASSAY OF MAGNESIUM: CPT

## 2020-05-03 PROCEDURE — 3700000001 HC ADD 15 MINUTES (ANESTHESIA): Performed by: SPECIALIST

## 2020-05-03 DEVICE — BILIARY STENT WITH NAVIFLEXTM RX DELIVERY SYSTEM
Type: IMPLANTABLE DEVICE | Status: FUNCTIONAL
Brand: ADVANIX™ BILIARY

## 2020-05-03 RX ORDER — PROPOFOL 10 MG/ML
INJECTION, EMULSION INTRAVENOUS PRN
Status: DISCONTINUED | OUTPATIENT
Start: 2020-05-03 | End: 2020-05-03 | Stop reason: SDUPTHER

## 2020-05-03 RX ORDER — ONDANSETRON 2 MG/ML
4 INJECTION INTRAMUSCULAR; INTRAVENOUS
Status: DISCONTINUED | OUTPATIENT
Start: 2020-05-03 | End: 2020-05-03 | Stop reason: HOSPADM

## 2020-05-03 RX ORDER — HYDRALAZINE HYDROCHLORIDE 20 MG/ML
5 INJECTION INTRAMUSCULAR; INTRAVENOUS EVERY 10 MIN PRN
Status: DISCONTINUED | OUTPATIENT
Start: 2020-05-03 | End: 2020-05-03 | Stop reason: HOSPADM

## 2020-05-03 RX ORDER — ROCURONIUM BROMIDE 10 MG/ML
INJECTION, SOLUTION INTRAVENOUS PRN
Status: DISCONTINUED | OUTPATIENT
Start: 2020-05-03 | End: 2020-05-03 | Stop reason: SDUPTHER

## 2020-05-03 RX ORDER — LIDOCAINE HYDROCHLORIDE 20 MG/ML
INJECTION, SOLUTION INFILTRATION; PERINEURAL PRN
Status: DISCONTINUED | OUTPATIENT
Start: 2020-05-03 | End: 2020-05-03 | Stop reason: SDUPTHER

## 2020-05-03 RX ORDER — ONDANSETRON 2 MG/ML
INJECTION INTRAMUSCULAR; INTRAVENOUS PRN
Status: DISCONTINUED | OUTPATIENT
Start: 2020-05-03 | End: 2020-05-03 | Stop reason: SDUPTHER

## 2020-05-03 RX ORDER — LABETALOL HYDROCHLORIDE 5 MG/ML
5 INJECTION, SOLUTION INTRAVENOUS EVERY 10 MIN PRN
Status: DISCONTINUED | OUTPATIENT
Start: 2020-05-03 | End: 2020-05-03 | Stop reason: HOSPADM

## 2020-05-03 RX ORDER — FENTANYL CITRATE 50 UG/ML
25 INJECTION, SOLUTION INTRAMUSCULAR; INTRAVENOUS EVERY 5 MIN PRN
Status: DISCONTINUED | OUTPATIENT
Start: 2020-05-03 | End: 2020-05-03 | Stop reason: HOSPADM

## 2020-05-03 RX ORDER — DEXAMETHASONE SODIUM PHOSPHATE 4 MG/ML
INJECTION, SOLUTION INTRA-ARTICULAR; INTRALESIONAL; INTRAMUSCULAR; INTRAVENOUS; SOFT TISSUE PRN
Status: DISCONTINUED | OUTPATIENT
Start: 2020-05-03 | End: 2020-05-03 | Stop reason: SDUPTHER

## 2020-05-03 RX ADMIN — PIPERACILLIN AND TAZOBACTAM 3.38 G: 3; .375 INJECTION, POWDER, LYOPHILIZED, FOR SOLUTION INTRAVENOUS at 16:30

## 2020-05-03 RX ADMIN — CETIRIZINE HYDROCHLORIDE 10 MG: 10 TABLET, FILM COATED ORAL at 11:15

## 2020-05-03 RX ADMIN — ROCURONIUM BROMIDE 50 MG: 10 INJECTION INTRAVENOUS at 08:05

## 2020-05-03 RX ADMIN — PROPOFOL 130 MG: 10 INJECTION, EMULSION INTRAVENOUS at 08:05

## 2020-05-03 RX ADMIN — PIPERACILLIN AND TAZOBACTAM 3.38 G: 3; .375 INJECTION, POWDER, LYOPHILIZED, FOR SOLUTION INTRAVENOUS at 00:53

## 2020-05-03 RX ADMIN — CELECOXIB 200 MG: 200 CAPSULE ORAL at 11:06

## 2020-05-03 RX ADMIN — TAMSULOSIN HYDROCHLORIDE 0.4 MG: 0.4 CAPSULE ORAL at 11:04

## 2020-05-03 RX ADMIN — LIDOCAINE HYDROCHLORIDE 100 MG: 20 INJECTION, SOLUTION INFILTRATION; PERINEURAL at 08:05

## 2020-05-03 RX ADMIN — SUGAMMADEX 200 MG: 100 INJECTION, SOLUTION INTRAVENOUS at 08:48

## 2020-05-03 RX ADMIN — PHENYLEPHRINE HYDROCHLORIDE 200 MCG: 10 INJECTION INTRAVENOUS at 08:08

## 2020-05-03 RX ADMIN — SODIUM CHLORIDE, POTASSIUM CHLORIDE, SODIUM LACTATE AND CALCIUM CHLORIDE: 600; 310; 30; 20 INJECTION, SOLUTION INTRAVENOUS at 01:06

## 2020-05-03 RX ADMIN — DEXAMETHASONE SODIUM PHOSPHATE 4 MG: 4 INJECTION, SOLUTION INTRAMUSCULAR; INTRAVENOUS at 08:05

## 2020-05-03 RX ADMIN — GABAPENTIN 800 MG: 400 CAPSULE ORAL at 15:08

## 2020-05-03 RX ADMIN — SODIUM CHLORIDE, POTASSIUM CHLORIDE, SODIUM LACTATE AND CALCIUM CHLORIDE: 600; 310; 30; 20 INJECTION, SOLUTION INTRAVENOUS at 09:32

## 2020-05-03 RX ADMIN — GUAIFENESIN 1200 MG: 600 TABLET, EXTENDED RELEASE ORAL at 11:05

## 2020-05-03 RX ADMIN — PIPERACILLIN AND TAZOBACTAM 3.38 G: 3; .375 INJECTION, POWDER, LYOPHILIZED, FOR SOLUTION INTRAVENOUS at 11:01

## 2020-05-03 RX ADMIN — SODIUM CHLORIDE, PRESERVATIVE FREE 10 ML: 5 INJECTION INTRAVENOUS at 11:14

## 2020-05-03 RX ADMIN — PANTOPRAZOLE SODIUM 40 MG: 40 TABLET, DELAYED RELEASE ORAL at 06:09

## 2020-05-03 RX ADMIN — MECLIZINE 12.5 MG: 12.5 TABLET ORAL at 11:05

## 2020-05-03 RX ADMIN — INDOMETHACIN 100 MG: 50 SUPPOSITORY RECTAL at 08:54

## 2020-05-03 RX ADMIN — FERROUS SULFATE TAB 325 MG (65 MG ELEMENTAL FE) 325 MG: 325 (65 FE) TAB at 11:05

## 2020-05-03 RX ADMIN — FINASTERIDE 5 MG: 5 TABLET, FILM COATED ORAL at 11:05

## 2020-05-03 RX ADMIN — GABAPENTIN 800 MG: 400 CAPSULE ORAL at 11:05

## 2020-05-03 RX ADMIN — AMLODIPINE BESYLATE 5 MG: 5 TABLET ORAL at 11:06

## 2020-05-03 RX ADMIN — DIPHENHYDRAMINE HYDROCHLORIDE 25 MG: 50 INJECTION, SOLUTION INTRAMUSCULAR; INTRAVENOUS at 00:54

## 2020-05-03 RX ADMIN — ONDANSETRON 4 MG: 2 INJECTION INTRAMUSCULAR; INTRAVENOUS at 08:46

## 2020-05-03 RX ADMIN — ATORVASTATIN CALCIUM 10 MG: 10 TABLET, FILM COATED ORAL at 11:05

## 2020-05-03 RX ADMIN — Medication 5000 UNITS: at 11:04

## 2020-05-03 ASSESSMENT — PULMONARY FUNCTION TESTS
PIF_VALUE: 1
PIF_VALUE: -15
PIF_VALUE: 20
PIF_VALUE: 19
PIF_VALUE: -14
PIF_VALUE: 24
PIF_VALUE: 20
PIF_VALUE: -14
PIF_VALUE: 1
PIF_VALUE: 2
PIF_VALUE: 23
PIF_VALUE: 19
PIF_VALUE: 24
PIF_VALUE: 20
PIF_VALUE: 19
PIF_VALUE: 20
PIF_VALUE: 2
PIF_VALUE: -14
PIF_VALUE: 21
PIF_VALUE: 19
PIF_VALUE: 13
PIF_VALUE: 24
PIF_VALUE: 14
PIF_VALUE: 20
PIF_VALUE: 13
PIF_VALUE: 19
PIF_VALUE: 23
PIF_VALUE: 13
PIF_VALUE: 14
PIF_VALUE: 40
PIF_VALUE: 21
PIF_VALUE: 24
PIF_VALUE: 23
PIF_VALUE: 16
PIF_VALUE: 1
PIF_VALUE: 19
PIF_VALUE: -14
PIF_VALUE: 14
PIF_VALUE: 20
PIF_VALUE: 13
PIF_VALUE: 21
PIF_VALUE: 25
PIF_VALUE: 23
PIF_VALUE: 2
PIF_VALUE: 2
PIF_VALUE: -14
PIF_VALUE: 16
PIF_VALUE: -14
PIF_VALUE: 23
PIF_VALUE: 2
PIF_VALUE: 8
PIF_VALUE: -14
PIF_VALUE: 32
PIF_VALUE: 19
PIF_VALUE: 20
PIF_VALUE: -14
PIF_VALUE: 20
PIF_VALUE: 24
PIF_VALUE: 20
PIF_VALUE: 23
PIF_VALUE: -3
PIF_VALUE: 20
PIF_VALUE: 3
PIF_VALUE: 20
PIF_VALUE: 21
PIF_VALUE: 3
PIF_VALUE: 3
PIF_VALUE: 21
PIF_VALUE: 20
PIF_VALUE: 20
PIF_VALUE: 22
PIF_VALUE: 26
PIF_VALUE: -14
PIF_VALUE: 16
PIF_VALUE: -14
PIF_VALUE: 24
PIF_VALUE: -14
PIF_VALUE: 1
PIF_VALUE: -14
PIF_VALUE: -14
PIF_VALUE: 24
PIF_VALUE: 23
PIF_VALUE: 24
PIF_VALUE: 24
PIF_VALUE: 22
PIF_VALUE: 13
PIF_VALUE: -14
PIF_VALUE: 14
PIF_VALUE: 13

## 2020-05-03 ASSESSMENT — PAIN SCALES - GENERAL
PAINLEVEL_OUTOF10: 0

## 2020-05-03 NOTE — ANESTHESIA POSTPROCEDURE EVALUATION
Department of Anesthesiology  Postprocedure Note    Patient: Krupa Simon  MRN: [id number]  YOB: 1945  Date of evaluation: 5/3/2020  Time:  11:24 AM     Procedure Summary     Date:  05/03/20 Room / Location:  [address]    Anesthesia Start:  1039 Anesthesia Stop:  4336    Procedure:  ERCP STENT INSERTION of 7mm biliary stent, pd not injected (N/A ) Diagnosis:  (cbd stones)    Surgeon:  Phillip Moore MD Responsible Provider:  SIDDHARTH Fine CRNA    Anesthesia Type:  general ASA Status:  4          Anesthesia Type: general    Kenna Phase I: Kenna Score: 9    Kenna Phase II:      Last vitals: Reviewed and per EMR flowsheets.        Anesthesia Post Evaluation    Patient location during evaluation: PACU  Patient participation: complete - patient participated  Level of consciousness: awake and alert  Pain score: 0  Airway patency: patent  Nausea & Vomiting: no nausea and no vomiting  Complications: no  Cardiovascular status: blood pressure returned to baseline  Respiratory status: acceptable  Hydration status: euvolemic

## 2020-05-03 NOTE — PROGRESS NOTES
GENERAL SURGERY PROGRESS NOTE    Javy Noyola is a 75 y.o. male with choledocholithiasis POD 3 from lap colin with IOC and ductal lavage. Subjective:  Had successful ERCP today with stent placement for bile leak. Sleepy when seen this afternoon but endorses improvement in his abdominal pain. Denies complaints. Objective:    Vitals: VITALS:  /60   Pulse 103   Temp 97.6 °F (36.4 °C) (Oral)   Resp 28   Ht 5' 10\" (1.778 m)   Wt 195 lb 6.4 oz (88.6 kg)   SpO2 90%   BMI 28.04 kg/m²     I/O: 05/02 0701 - 05/03 0700  In: 3325.3 [P.O.:1320;  I.V.:1905.3]  Out: 1010 [Urine:600; Drains:410]    Labs/Imaging Results:   Lab Results   Component Value Date     05/03/2020    K 3.9 05/03/2020     05/03/2020    CO2 27 05/03/2020    BUN 11 05/03/2020    CREATININE 0.6 05/03/2020    GLUCOSE 129 05/03/2020    CALCIUM 8.6 05/03/2020      Lab Results   Component Value Date    WBC 9.3 05/03/2020    HGB 11.6 (L) 05/03/2020    HCT 32.9 (L) 05/03/2020    .8 (H) 05/03/2020     05/03/2020         IV Fluids: lactated ringers Last Rate: 100 mL/hr at 05/03/20 0932    Scheduled Meds: piperacillin-tazobactam, 3.375 g, Intravenous, Q8H    sodium chloride flush, 10 mL, Intravenous, 2 times per day    0.9 % sodium chloride, 250 mL, Intravenous, Once    0.9 % sodium chloride, 250 mL, Intravenous, Once    celecoxib, 200 mg, Oral, Daily    sodium chloride flush, 10 mL, Intravenous, 2 times per day    amitriptyline, 25 mg, Oral, Nightly    amLODIPine, 5 mg, Oral, Daily    atorvastatin, 10 mg, Oral, Daily    vitamin D, 5,000 Units, Oral, Daily    ferrous sulfate, 325 mg, Oral, Daily with breakfast    finasteride, 5 mg, Oral, Daily    gabapentin, 800 mg, Oral, TID    guaiFENesin, 1,200 mg, Oral, BID    cetirizine, 10 mg, Oral, Daily    meclizine, 12.5 mg, Oral, BID    pantoprazole, 40 mg, Oral, QAM AC    tamsulosin, 0.4 mg, Oral, Daily    sodium chloride flush, 10 mL, Intravenous, 2 times per day    Physical Exam:  General: A&O x 3, no distress. HEENT: Anicteric sclerae, MMM. Extremities: No edema bilat LE. Abdomen: Soft, nontender, non distended. Mildly tender to palpation    Drain output 410 last 24h but almost nothing in the bulb since his ERCP      Assessment and Plan:  75 y.o. male with choledocholithiasis. POD 3 from lap cholecystectomy with IOC and lavage of his common bile duct after he was unable to have his papilla cannulated on ERCP. Post procedure day 0 from ERCP with stent. Doing well    Principal Problem:    Choledocholithiasis with obstruction  Active Problems:    Jaundice    Cholelithiasis with obstruction  Resolved Problems:    * No resolved hospital problems.  *          - will keep NPO today post ERCP per Dr. Cb Cancino instructions  -monitor drain function and LFTs  - will continue to follow    Stefany Davalos MD  5/3/2020  12:51 PM

## 2020-05-03 NOTE — ANESTHESIA PRE PROCEDURE
Department of Anesthesiology  Preprocedure Note       Name:  Ernesto Espinoza   Age:  75 y.o.  :  1945                                          MRN:  [id number]         Date:  5/3/2020      Surgeon: Hai Avalos):  Ting Wong MD    Procedure: ERCP DIAGNOSTIC (N/A )    Medications prior to admission:   Prior to Admission medications    Medication Sig Start Date End Date Taking? Authorizing Provider   warfarin (COUMADIN) 2.5 MG tablet Take 6.5 mg by mouth daily    Historical Provider, MD   amitriptyline (ELAVIL) 25 MG tablet Take 25 mg by mouth nightly    Historical Provider, MD   amLODIPine (NORVASC) 5 MG tablet Take 5 mg by mouth daily    Historical Provider, MD   atorvastatin (LIPITOR) 10 MG tablet Take 10 mg by mouth daily    Historical Provider, MD   bisacodyl (DULCOLAX) 5 MG EC tablet Take 10 mg by mouth daily    Historical Provider, MD   celecoxib (CELEBREX) 200 MG capsule Take 200 mg by mouth daily    Historical Provider, MD   ferrous sulfate 325 (65 Fe) MG EC tablet Take 325 mg by mouth daily (with breakfast)    Historical Provider, MD   finasteride (PROSCAR) 5 MG tablet Take 5 mg by mouth daily    Historical Provider, MD   loratadine (CLARITIN) 10 MG capsule Take 10 mg by mouth daily    Historical Provider, MD   omeprazole (PRILOSEC) 20 MG delayed release capsule Take 20 mg by mouth Daily    Historical Provider, MD   tamsulosin (FLOMAX) 0.4 MG capsule Take 0.4 mg by mouth daily    Historical Provider, MD   Cholecalciferol (VITAMIN D3) 125 MCG (5000 UT) TABS Take 5,000 Units by mouth daily    Historical Provider, MD   meclizine (ANTIVERT) 12.5 MG tablet Take 12.5 mg by mouth 2 times daily    Historical Provider, MD   guaiFENesin (MUCINEX) 600 MG extended release tablet Take 1,200 mg by mouth 2 times daily    Historical Provider, MD   gabapentin (NEURONTIN) 400 MG capsule Take 800 mg by mouth 3 times daily.     Historical Provider, MD   Sodium Bicarbonate-Citric Acid (LEODAN-SELTZER HEARTBURN PO) Take 2 tablets by mouth daily as needed    Historical Provider, MD   magnesium hydroxide (MILK OF MAGNESIA) 400 MG/5ML suspension Take 30 mLs by mouth daily as needed for Constipation    Historical Provider, MD   polyethylene glycol (GLYCOLAX) packet Take 17 g by mouth daily as needed for Constipation    Historical Provider, MD   terbinafine (LAMISIL) 1 % cream Apply topically 2 times daily Apply topically 2 times daily. Historical Provider, MD       Current medications:    No current facility-administered medications for this visit. No current outpatient medications on file.      Facility-Administered Medications Ordered in Other Visits   Medication Dose Route Frequency Provider Last Rate Last Dose    HYDROcodone-acetaminophen (NORCO) 5-325 MG per tablet 1 tablet  1 tablet Oral Q4H PRN Graciela Simons MD        Or   [facility] HYDROcodone-acetaminophen (NORCO) 5-325 MG per tablet 2 tablet  2 tablet Oral Q4H PRN Graciela Simons MD        lactated ringers infusion   Intravenous Continuous Funmilayo Irvin  mL/hr at 05/03/20 0106      piperacillin-tazobactam (ZOSYN) 3.375 g in dextrose 5 % 50 mL IVPB extended infusion (mini-bag)  3.375 g Intravenous Q8H Funmilayo Irvin MD   Stopped at 05/03/20 0453    indomethacin (INDOCIN) 50 MG suppository 100 mg  100 mg Rectal Once Funmilayo Irvin MD        sodium chloride flush 0.9 % injection 10 mL  10 mL Intravenous 2 times per day Maddy Rousseau MD   10 mL at 05/02/20 2152    sodium chloride flush 0.9 % injection 10 mL  10 mL Intravenous PRN Maddy Rousseau MD        0.9 % sodium chloride infusion 250 mL  250 mL Intravenous Once Maddy Rousseau MD        0.9 % sodium chloride infusion 250 mL  250 mL Intravenous Once Maddy Rousseau MD        celecoxib (CELEBREX) capsule 200 mg  200 mg Oral Daily Maddy Rousseau MD   200 mg at 05/02/20 0825    sodium chloride flush 0.9 % injection 10 mL  10 mL Intravenous 2 times per day Maddy Rousseau MD   10 Jordyn Maurer MD   10 mL at 04/28/20 0229    acetaminophen (TYLENOL) tablet 650 mg  650 mg Oral Q6H PRN Serge Garcia MD        Or   [address] [place] [address] acetaminophen (TYLENOL) suppository 650 mg  650 mg Rectal Q6H PRN Serge Garcia MD        magnesium hydroxide (MILK OF MAGNESIA) 400 MG/5ML suspension 30 mL  30 mL Oral Daily PRN Serge Garcia MD        promethazine (PHENERGAN) tablet 12.5 mg  12.5 mg Oral Q6H PRN Serge Garcia MD        Or    ondansetron [facility] injection 4 mg  4 mg Intravenous Q6H PRN Serge Garcia MD           Allergies:     Allergies   Allergen Reactions    Sulfa Antibiotics Other (See Comments)     unknown    Tramadol Other (See Comments)     Shaking       Problem List:    Patient Active Problem List   Diagnosis Code    Jaundice R17    Elevated liver enzymes R74.8    Choledocholithiasis with obstruction K80.51    Cholelithiasis with obstruction K80.21       Past Medical History:        Diagnosis Date    Allergic rhinitis     Anxiety     BPH (benign prostatic hyperplasia)     CAD (coronary artery disease)     Dermatitis     GERD (gastroesophageal reflux disease)     Hyperlipidemia     Hypertension     Idiopathic peripheral autonomic neuropathy     Insomnia     Meniere's disease     Osteoarthritis     Polyneuropathy     Pulmonary embolism (HCC)     Urinary retention     Vitamin B12 deficiency anemia        Past Surgical History:        Procedure Laterality Date    ABOVE KNEE AMPUTATION Right     APPENDECTOMY      CHOLECYSTECTOMY, LAPAROSCOPIC N/A 5/1/2020    CHOLECYSTECTOMY LAPAROSCOPIC WITH IOC performed by Serge Garcia MD at [place] [address] ERCP N/A 4/30/2020    ERCP DIAGNOSTIC performed by Serge Garcia MD at [facility] ENDOSCOPY    TONSILLECTOMY         Social History:    Social History     Tobacco Use    Smoking status: Former Smoker    Smokeless tobacco: Never Used   Substance Use Topics    Alcohol use: Never     Frequency: Never Counseling given: Not Answered      Vital Signs (Current): There were no vitals filed for this visit. BP Readings from Last 3 Encounters:   05/03/20 136/68   05/01/20 87/62   04/30/20 (!) 147/101       NPO Status:                                                                                 BMI:   Wt Readings from Last 3 Encounters:   05/03/20 195 lb 6.4 oz (88.6 kg)   01/31/20 178 lb (80.7 kg)   01/26/20 197 lb (89.4 kg)     There is no height or weight on file to calculate BMI.    CBC:   Lab Results   Component Value Date    WBC 9.3 05/03/2020    RBC 3.17 05/03/2020    HGB 11.6 05/03/2020    HCT 32.9 05/03/2020    .8 05/03/2020    RDW 15.3 05/03/2020     05/03/2020       CMP:   Lab Results   Component Value Date     05/03/2020    K 3.9 05/03/2020     05/03/2020    CO2 27 05/03/2020    BUN 11 05/03/2020    CREATININE 0.6 05/03/2020    GFRAA >60 05/03/2020    LABGLOM >60 05/03/2020    GLUCOSE 129 05/03/2020    PROT 5.5 05/03/2020    CALCIUM 8.6 05/03/2020    BILITOT 3.1 05/03/2020    ALKPHOS 91 05/03/2020    AST 17 05/03/2020    ALT 27 05/03/2020       POC Tests: No results for input(s): POCGLU, POCNA, POCK, POCCL, POCBUN, POCHEMO, POCHCT in the last 72 hours.     Coags:   Lab Results   Component Value Date    PROTIME 17.1 05/03/2020    INR 1.41 05/03/2020    APTT 64.3 05/03/2020       HCG (If Applicable): No results found for: PREGTESTUR, PREGSERUM, HCG, HCGQUANT     ABGs: No results found for: PHART, PO2ART, HDC8FGO, YSO4OEH, BEART, Z2QCSCRS     Type & Screen (If Applicable):  No results found for: LABABO, LABRH    Anesthesia Evaluation    Airway: Mallampati: IV  TM distance: >3 FB   Neck ROM: full  Mouth opening: < 3 FB Dental:    (+) upper dentures      Pulmonary:   (+) decreased breath sounds,                            ROS comment: Currently on 2 L O2 via Nasal cannula   Cardiovascular:  Exercise tolerance: poor (<4

## 2020-05-03 NOTE — PROGRESS NOTES
nerves:II-XII intact, grossly non-focal.  Mental status: Alert, appropriate, pleasant        Medications:   Medications:    piperacillin-tazobactam  3.375 g Intravenous Q8H    sodium chloride flush  10 mL Intravenous 2 times per day    0.9 % sodium chloride  250 mL Intravenous Once    0.9 % sodium chloride  250 mL Intravenous Once    celecoxib  200 mg Oral Daily    sodium chloride flush  10 mL Intravenous 2 times per day    amitriptyline  25 mg Oral Nightly    amLODIPine  5 mg Oral Daily    atorvastatin  10 mg Oral Daily    vitamin D  5,000 Units Oral Daily    ferrous sulfate  325 mg Oral Daily with breakfast    finasteride  5 mg Oral Daily    gabapentin  800 mg Oral TID    guaiFENesin  1,200 mg Oral BID    cetirizine  10 mg Oral Daily    meclizine  12.5 mg Oral BID    pantoprazole  40 mg Oral QAM AC    tamsulosin  0.4 mg Oral Daily    sodium chloride flush  10 mL Intravenous 2 times per day      Infusions:    lactated ringers 100 mL/hr at 05/03/20 0932     PRN Meds: HYDROcodone 5 mg - acetaminophen, 1 tablet, Q4H PRN    Or  HYDROcodone 5 mg - acetaminophen, 2 tablet, Q4H PRN  sodium chloride flush, 10 mL, PRN  sodium chloride flush, 10 mL, PRN  promethazine, 12.5 mg, Q6H PRN    Or  ondansetron, 4 mg, Q6H PRN  polyethylene glycol, 17 g, Daily PRN  sodium chloride flush, 10 mL, PRN  acetaminophen, 650 mg, Q6H PRN    Or  acetaminophen, 650 mg, Q6H PRN  magnesium hydroxide, 30 mL, Daily PRN  promethazine, 12.5 mg, Q6H PRN    Or  ondansetron, 4 mg, Q6H PRN      WBC 9.3  TB 3.1  Lactate nml      Electronically signed by Emily Mtz MD on 5/3/2020 at 2:12 PM

## 2020-05-04 ENCOUNTER — APPOINTMENT (OUTPATIENT)
Dept: CT IMAGING | Age: 75
DRG: 417 | End: 2020-05-04
Payer: MEDICARE

## 2020-05-04 LAB
ALBUMIN SERPL-MCNC: 3.1 GM/DL (ref 3.4–5)
ALBUMIN SERPL-MCNC: 3.1 GM/DL (ref 3.4–5)
ALP BLD-CCNC: 86 IU/L (ref 40–128)
ALP BLD-CCNC: 86 IU/L (ref 40–129)
ALT SERPL-CCNC: 19 U/L (ref 10–40)
ALT SERPL-CCNC: 19 U/L (ref 10–40)
ANION GAP SERPL CALCULATED.3IONS-SCNC: 11 MMOL/L (ref 4–16)
APTT: 66.2 SECONDS (ref 25.1–37.1)
AST SERPL-CCNC: 14 IU/L (ref 15–37)
AST SERPL-CCNC: 14 IU/L (ref 15–37)
BASOPHILS ABSOLUTE: 0 K/CU MM
BASOPHILS RELATIVE PERCENT: 0.2 % (ref 0–1)
BILIRUB SERPL-MCNC: 2.1 MG/DL (ref 0–1)
BILIRUB SERPL-MCNC: 2.1 MG/DL (ref 0–1)
BILIRUBIN DIRECT: 1.1 MG/DL (ref 0–0.3)
BILIRUBIN, INDIRECT: 1 MG/DL (ref 0–0.7)
BUN BLDV-MCNC: 14 MG/DL (ref 6–23)
CALCIUM SERPL-MCNC: 8.5 MG/DL (ref 8.3–10.6)
CHLORIDE BLD-SCNC: 100 MMOL/L (ref 99–110)
CO2: 25 MMOL/L (ref 21–32)
CREAT SERPL-MCNC: 0.5 MG/DL (ref 0.9–1.3)
DIFFERENTIAL TYPE: ABNORMAL
EOSINOPHILS ABSOLUTE: 0 K/CU MM
EOSINOPHILS RELATIVE PERCENT: 0.2 % (ref 0–3)
GFR AFRICAN AMERICAN: >60 ML/MIN/1.73M2
GFR NON-AFRICAN AMERICAN: >60 ML/MIN/1.73M2
GLUCOSE BLD-MCNC: 136 MG/DL (ref 70–99)
HCT VFR BLD CALC: 34 % (ref 42–52)
HEMOGLOBIN: 11 GM/DL (ref 13.5–18)
IMMATURE NEUTROPHIL %: 0.6 % (ref 0–0.43)
INR BLD: 1.17 INDEX
LYMPHOCYTES ABSOLUTE: 0.5 K/CU MM
LYMPHOCYTES RELATIVE PERCENT: 6 % (ref 24–44)
MAGNESIUM: 2.2 MG/DL (ref 1.8–2.4)
MCH RBC QN AUTO: 34.3 PG (ref 27–31)
MCHC RBC AUTO-ENTMCNC: 32.4 % (ref 32–36)
MCV RBC AUTO: 105.9 FL (ref 78–100)
MONOCYTES ABSOLUTE: 0.3 K/CU MM
MONOCYTES RELATIVE PERCENT: 3.4 % (ref 0–4)
NUCLEATED RBC %: 0 %
PDW BLD-RTO: 13 % (ref 11.7–14.9)
PLATELET # BLD: 172 K/CU MM (ref 140–440)
PMV BLD AUTO: 10.9 FL (ref 7.5–11.1)
POTASSIUM SERPL-SCNC: 3.8 MMOL/L (ref 3.5–5.1)
PROTHROMBIN TIME: 14.2 SECONDS (ref 11.7–14.5)
RBC # BLD: 3.21 M/CU MM (ref 4.6–6.2)
SEGMENTED NEUTROPHILS ABSOLUTE COUNT: 8.1 K/CU MM
SEGMENTED NEUTROPHILS RELATIVE PERCENT: 89.6 % (ref 36–66)
SODIUM BLD-SCNC: 136 MMOL/L (ref 135–145)
TOTAL IMMATURE NEUTOROPHIL: 0.05 K/CU MM
TOTAL NUCLEATED RBC: 0 K/CU MM
TOTAL PROTEIN: 5.4 GM/DL (ref 6.4–8.2)
TOTAL PROTEIN: 5.4 GM/DL (ref 6.4–8.2)
WBC # BLD: 9.1 K/CU MM (ref 4–10.5)

## 2020-05-04 PROCEDURE — 2580000003 HC RX 258: Performed by: SURGERY

## 2020-05-04 PROCEDURE — 2580000003 HC RX 258: Performed by: INTERNAL MEDICINE

## 2020-05-04 PROCEDURE — 6370000000 HC RX 637 (ALT 250 FOR IP): Performed by: INTERNAL MEDICINE

## 2020-05-04 PROCEDURE — 97116 GAIT TRAINING THERAPY: CPT

## 2020-05-04 PROCEDURE — 83735 ASSAY OF MAGNESIUM: CPT

## 2020-05-04 PROCEDURE — 2580000003 HC RX 258: Performed by: SPECIALIST

## 2020-05-04 PROCEDURE — 80048 BASIC METABOLIC PNL TOTAL CA: CPT

## 2020-05-04 PROCEDURE — 97163 PT EVAL HIGH COMPLEX 45 MIN: CPT

## 2020-05-04 PROCEDURE — 94761 N-INVAS EAR/PLS OXIMETRY MLT: CPT

## 2020-05-04 PROCEDURE — 6370000000 HC RX 637 (ALT 250 FOR IP): Performed by: SURGERY

## 2020-05-04 PROCEDURE — 99024 POSTOP FOLLOW-UP VISIT: CPT | Performed by: SURGERY

## 2020-05-04 PROCEDURE — 85730 THROMBOPLASTIN TIME PARTIAL: CPT

## 2020-05-04 PROCEDURE — 85025 COMPLETE CBC W/AUTO DIFF WBC: CPT

## 2020-05-04 PROCEDURE — 80053 COMPREHEN METABOLIC PANEL: CPT

## 2020-05-04 PROCEDURE — 71250 CT THORAX DX C-: CPT

## 2020-05-04 PROCEDURE — 82248 BILIRUBIN DIRECT: CPT

## 2020-05-04 PROCEDURE — 6360000002 HC RX W HCPCS: Performed by: SPECIALIST

## 2020-05-04 PROCEDURE — 85610 PROTHROMBIN TIME: CPT

## 2020-05-04 PROCEDURE — 1200000000 HC SEMI PRIVATE

## 2020-05-04 PROCEDURE — 97530 THERAPEUTIC ACTIVITIES: CPT

## 2020-05-04 RX ORDER — WARFARIN SODIUM 2.5 MG/1
5 TABLET ORAL DAILY
Status: DISCONTINUED | OUTPATIENT
Start: 2020-05-04 | End: 2020-05-06 | Stop reason: HOSPADM

## 2020-05-04 RX ADMIN — GUAIFENESIN 1200 MG: 600 TABLET, EXTENDED RELEASE ORAL at 09:19

## 2020-05-04 RX ADMIN — FINASTERIDE 5 MG: 5 TABLET, FILM COATED ORAL at 09:19

## 2020-05-04 RX ADMIN — SODIUM CHLORIDE, PRESERVATIVE FREE 10 ML: 5 INJECTION INTRAVENOUS at 00:17

## 2020-05-04 RX ADMIN — MECLIZINE 12.5 MG: 12.5 TABLET ORAL at 00:06

## 2020-05-04 RX ADMIN — CETIRIZINE HYDROCHLORIDE 10 MG: 10 TABLET, FILM COATED ORAL at 09:19

## 2020-05-04 RX ADMIN — PIPERACILLIN AND TAZOBACTAM 3.38 G: 3; .375 INJECTION, POWDER, LYOPHILIZED, FOR SOLUTION INTRAVENOUS at 00:18

## 2020-05-04 RX ADMIN — AMITRIPTYLINE HYDROCHLORIDE 25 MG: 25 TABLET, FILM COATED ORAL at 21:59

## 2020-05-04 RX ADMIN — WARFARIN SODIUM 5 MG: 2.5 TABLET ORAL at 17:04

## 2020-05-04 RX ADMIN — MECLIZINE 12.5 MG: 12.5 TABLET ORAL at 21:59

## 2020-05-04 RX ADMIN — CELECOXIB 200 MG: 200 CAPSULE ORAL at 09:19

## 2020-05-04 RX ADMIN — AMLODIPINE BESYLATE 5 MG: 5 TABLET ORAL at 09:19

## 2020-05-04 RX ADMIN — SODIUM CHLORIDE, PRESERVATIVE FREE 10 ML: 5 INJECTION INTRAVENOUS at 21:59

## 2020-05-04 RX ADMIN — SODIUM CHLORIDE, PRESERVATIVE FREE 10 ML: 5 INJECTION INTRAVENOUS at 00:06

## 2020-05-04 RX ADMIN — GABAPENTIN 800 MG: 400 CAPSULE ORAL at 00:16

## 2020-05-04 RX ADMIN — HYDROCODONE BITARTRATE AND ACETAMINOPHEN 2 TABLET: 5; 325 TABLET ORAL at 00:15

## 2020-05-04 RX ADMIN — SODIUM CHLORIDE, POTASSIUM CHLORIDE, SODIUM LACTATE AND CALCIUM CHLORIDE: 600; 310; 30; 20 INJECTION, SOLUTION INTRAVENOUS at 00:28

## 2020-05-04 RX ADMIN — GABAPENTIN 800 MG: 400 CAPSULE ORAL at 21:58

## 2020-05-04 RX ADMIN — AMITRIPTYLINE HYDROCHLORIDE 25 MG: 25 TABLET, FILM COATED ORAL at 00:15

## 2020-05-04 RX ADMIN — GABAPENTIN 800 MG: 400 CAPSULE ORAL at 09:20

## 2020-05-04 RX ADMIN — Medication 5000 UNITS: at 09:18

## 2020-05-04 RX ADMIN — ATORVASTATIN CALCIUM 10 MG: 10 TABLET, FILM COATED ORAL at 09:19

## 2020-05-04 RX ADMIN — GUAIFENESIN 1200 MG: 600 TABLET, EXTENDED RELEASE ORAL at 21:58

## 2020-05-04 RX ADMIN — TAMSULOSIN HYDROCHLORIDE 0.4 MG: 0.4 CAPSULE ORAL at 09:19

## 2020-05-04 RX ADMIN — PANTOPRAZOLE SODIUM 40 MG: 40 TABLET, DELAYED RELEASE ORAL at 06:17

## 2020-05-04 RX ADMIN — GUAIFENESIN 1200 MG: 600 TABLET, EXTENDED RELEASE ORAL at 00:16

## 2020-05-04 RX ADMIN — GABAPENTIN 800 MG: 400 CAPSULE ORAL at 17:11

## 2020-05-04 RX ADMIN — MECLIZINE 12.5 MG: 12.5 TABLET ORAL at 09:19

## 2020-05-04 ASSESSMENT — PAIN SCALES - GENERAL
PAINLEVEL_OUTOF10: 0
PAINLEVEL_OUTOF10: 0
PAINLEVEL_OUTOF10: 7
PAINLEVEL_OUTOF10: 0

## 2020-05-04 NOTE — PROGRESS NOTES
Weight Change: none noted  · Ideal Body Wt: 166 lb (75.3 kg), % Ideal Body 117%  · BMI Classification: BMI 25.0 - 29.9 Overweight    Nutrition Interventions:   Start ONS, Modify current diet  Continued Inpatient Monitoring, Education Not Indicated, Coordination of Care    Nutrition Evaluation:   · Evaluation: Goals set   · Goals: pt will consume greater than 75% of his meals and supplements    · Monitoring: Nutrition Progression, Meal Intake, Supplement Intake, Pertinent Labs, Weight, Wound Healing      Electronically signed by Olamide Berrios RD, LD on [birth year] at 11:32 AM EDT    Contact Number: [phone]

## 2020-05-04 NOTE — PROGRESS NOTES
[facility] [place] [facility]  [address] [facility] [address]      Fazal Hsu :2004 MRN:[id number]      2019 Time Session Began: 1545  Time Session Ended: 1630    Session Type:45 Minute Therapy (50244)    Others Present:      Intervention: Behavioral, Cognitive, Supportive    Suicide/Homicide/Violence Ideation: No    If Yes, explain:      Current Outpatient Medications   Medication Sig   • citalopram (CELEXA) 10 MG tablet Take 1 tablet by mouth daily.   • MELATONIN PO      No current facility-administered medications for this visit.        Change in Medication(s) Reported: Yes  If Yes, explain:  Started Citalopram    Patient/Family Education Provided: Yes  Patient/Family Displays Understanding: Yes    If No, explain:      Chief complaint in patient's own words: Depression and Anxiety    DARP: D:  Met with aFzal who shared that he started taking the medication @ nine days ago and has not seen any changes in mood thus far.   Fazal shared that school has been fine and he hasn't been experiencing any bullying as of late.  Fazal shared that most of his mood and anxiety issues are do to the problems at home, sharing that his parents have continued to argue frequently and he doesn't expect them to stay .  Fazal shared that he feels his mom is \"super toxic\" and he spends more time at his brother's girlfriend's house than he does at his own because he doesn't like being around his mom.  Fazal shared that his panic attacks occur mostly due to his mom yelling at him for things she feels he doesn't do.  A:  Fazal discussed the difficulties that he has been having at home with his mother.  Discussed the relationship he has with his mother and managing his feelings in a healthy manner.  R:  Fazal has hurt and anxiety regarding his mother that he feels is at the root of his anxiety and depression.  P:   [address]    Hospitalist Progress Note      Name:  Nano Segura /Age/Sex: 1945  (75 y.o. male)   MRN & CSN:  [id number] & [id number] Admission Date/Time: 2020  6:39 PM   Location:  [address] [place] [address] PCP: Tom Silva 57 Day: 8    Assessment and Plan:   Nano Segura is a 75 y.o.  male  who presents with jaundice    Chronic active cholecystitis/cholelithiasis/Choledocholithiasis/biliary obstruction   Previous incident was in January with TB 10  MRCP confirms filling defects in CBD with mild CBD dilation   Gi and gen surg following  ERCP  attempted but unable to cannulate papilla, Lap-colin , LFTs stable but bilious drainage from [facility] drain postop, Dr. Griffin Sotelo performed ERCP with biliary stent placement for bile leak 5/3, abd pain improved following procedure, will start clear diet today, advance as tolerate, d/c MIVF, incentive spirometry, OOB,     Sepsis--pt with fever, tachycardia, leukocytosis,  started empirically on zosyn d/t abdominal pain, bile leak, and concern for possible acute cholangitis given choledocholithiasis and obstructive jaundice, biliary stent placement, pt improved clinically, stopped zosyn    Hypertension - controlled with amlodipine,     Hyperlipidemia - on Lipitor     Hx of PE -held coumadin for surgery, will restart coumadin tonight, continue DVT proph lovenox while inpatient, no need for bridging lovenox as PE was 20 years ago, moderate thrombotic risk    S/p right BKA-PT/OT    Incidental LLL pulmonary nodule on CT abdomen - chest CT to further evaluate    Acute resp failure-- On 3L NC, CXR with low lung volumes, SOB likely d/t abd pain and atelectasis,  encouraged incentive spirometry,OOB, wean oxygen as tolerated    dispo--stop zosyn, start clears, d/c MIVF, PT/OT, OOB, restart coumadin, lives at [facility] in independent apartment, f/u PT/OT recs for d/c, d/c 2-3 days, wean oxygen, chest CT today for lung nodule      Diet DIET CLEAR Suggested having a family session to address issues with mom.     Need for Community Resources Assessed: Yes    Resources Needed: No    If Yes, what resources:      Primary Diagnosis: Major Depression Recurrent  : 1Mild    Treatment Plan: Unchanged    Discharge Plan: N/A    Next Appointment: 3/19/2019  Palmira Faith LPC

## 2020-05-04 NOTE — PROGRESS NOTES
GENERAL SURGERY INPATIENT POST-OP NOTE  [facility]) Physicians    PATIENT: Ibis Stewart, 75 y.o., male, MRN: [id number]    Hospital Day:  LOS: 6 days , Post-Op Day: 3 Days Post-Op    Procedure(s):   20 attempted ERCP (unable to cannulate due to flat/friable papilla)  20 laparoscopic cholecystectomy with [facility]  5/3/20 ERCP (Dr. Jerry Madison) with placement of 7F x 7cm stent    Ibis Stewart is a 75 y.o. male who presented with obstructive jaundice and MRCP findings of choledocholithiasis. Subjective:  Chief Complaint: abdominal pain  Pain: 4/10  BM: none since surgery   Diet: DIET CLEAR LIQUID;  Dietary Nutrition Supplements: Standard High Calorie Oral Supplement  Activity: as tolerated    He has been doing ok today. He has some abdominal soreness, but is improving. Denies nausea or vomiting, is hungry.  Surgical drain in place with ~50ml/24h output     Objective:    Vitals: /61   Pulse 77   Temp 97.6 °F (36.4 °C) (Oral)   Resp 16   Ht 5' 10\" (1.778 m)   Wt 194 lb 6.4 oz (88.2 kg)   SpO2 95%   BMI 27.89 kg/m²   Vital Signs (Last 24 Hours)  Temp  Av.7 °F (36.5 °C)  Min: 97.5 °F (36.4 °C)  Max: 98.1 °F (36.7 °C)  Pulse  Av.8  Min: 77  Max: 105  BP  Min: 132/66  Max: 153/71  Resp  Av.8  Min: 15  Max: 19  SpO2  Av %  Min: 92 %  Max: 95 %  Wt Readings from Last 3 Encounters:   20 194 lb 6.4 oz (88.2 kg)   20 178 lb (80.7 kg)   20 197 lb (89.4 kg)       I/O:  0701 -  0700  In: 1050 [P.O.:50; I.V.:1000]  Out: 50 [Drains:50]    IV Fluids:     Scheduled Meds:   warfarin, 5 mg, Oral, Daily    [START ON 2020] enoxaparin, 40 mg, Subcutaneous, Daily    sodium chloride flush, 10 mL, Intravenous, 2 times per day    0.9 % sodium chloride, 250 mL, Intravenous, Once    0.9 % sodium chloride, 250 mL, Intravenous, Once    celecoxib, 200 mg, Oral, Daily    sodium chloride flush, 10 mL, Intravenous, 2 times per day    amitriptyline, 25 mg, 2. 2 1.8 - 2.4 mg/dl   CBC Auto Differential    Collection Time: 05/04/20  3:17 AM   Result Value Ref Range    WBC 9.1 4.0 - 10.5 K/CU MM    RBC 3.21 (L) 4.6 - 6.2 M/CU MM    Hemoglobin 11.0 (L) 13.5 - 18.0 GM/DL    Hematocrit 34.0 (L) 42 - 52 %    .9 (H) 78 - 100 FL    MCH 34.3 (H) 27 - 31 PG    MCHC 32.4 32.0 - 36.0 %    RDW 13.0 11.7 - 14.9 %    Platelets 375 331 - 421 K/CU MM    MPV 10.9 7.5 - 11.1 FL    Differential Type AUTOMATED DIFFERENTIAL     Segs Relative 89.6 (H) 36 - 66 %    Lymphocytes % 6.0 (L) 24 - 44 %    Monocytes % 3.4 0 - 4 %    Eosinophils % 0.2 0 - 3 %    Basophils % 0.2 0 - 1 %    Segs Absolute 8.1 K/CU MM    Lymphocytes Absolute 0.5 K/CU MM    Monocytes Absolute 0.3 K/CU MM    Eosinophils Absolute 0.0 K/CU MM    Basophils Absolute 0.0 K/CU MM    Nucleated RBC % 0.0 %    Total Nucleated RBC 0.0 K/CU MM    Total Immature Neutrophil 0.05 K/CU MM    Immature Neutrophil % 0.6 (H) 0 - 0.43 %   Hepatic Function Panel    Collection Time: 05/04/20  3:17 AM   Result Value Ref Range    Alb 3.1 (L) 3.4 - 5.0 GM/DL    Total Bilirubin 2.1 (H) 0.0 - 1.0 MG/DL    Bilirubin, Direct 1.1 (H) 0.0 - 0.3 MG/DL    Bilirubin, Indirect 1.0 (H) 0 - 0.7 MG/DL    Alkaline Phosphatase 86 40 - 129 IU/L    AST 14 (L) 15 - 37 IU/L    ALT 19 10 - 40 U/L    Total Protein 5.4 (L) 6.4 - 8.2 GM/DL         Assessment:  Olivia Le is a 75 y.o. male with jaundice and CBD stones/sludge on MRCP who is s/p:   POD 4 4/30/20 attempted ERCP (unable to cannulate due to flat/friable papilla)  POD 3 5/1/20 laparoscopic cholecystectomy with IOC  POD 1 5/3/20 ERCP (Dr. Olamide Krueger) with placement of 7F x 7cm stent     Principal Problem:    Choledocholithiasis with obstruction  Active Problems:    Jaundice    Cholelithiasis with obstruction  Resolved Problems:    * No resolved hospital problems. *      Plan:  · Discussed with Fay Leal regarding events over the weekend.  He had increasing bile from his RUQ drain postoperatively suggestive of

## 2020-05-04 NOTE — PROGRESS NOTES
(05/04/20 8013)          Goals  Long term goals  Long term goal 1: In one week, pt will complete all bed mobility with SBAx1  Long term goal 2: In one week, pt will will complete sit <> stand transfers with SBAx1  Long term goal 3: In one week, pt will ambulate 30 feet with minAx1 with LRAD  Long term goal 4:  In one week, pt will independently complete 3 sets of 10 reps of BLE AROM exercises in available and allowed ROM       Time In: 1135  Time Out: 1230  Total Treatment Time: 55  Timed Code Treatment Minutes: 8[address] [facility] Nasrin Rose PT, DPT  License #: [id number]

## 2020-05-05 LAB
ALBUMIN SERPL-MCNC: 2.9 GM/DL (ref 3.4–5)
ALP BLD-CCNC: 89 IU/L (ref 40–128)
ALT SERPL-CCNC: 17 U/L (ref 10–40)
ANION GAP SERPL CALCULATED.3IONS-SCNC: 8 MMOL/L (ref 4–16)
APTT: 53.2 SECONDS (ref 25.1–37.1)
AST SERPL-CCNC: 14 IU/L (ref 15–37)
BASOPHILS ABSOLUTE: 0 K/CU MM
BASOPHILS RELATIVE PERCENT: 0.2 % (ref 0–1)
BILIRUB SERPL-MCNC: 1.7 MG/DL (ref 0–1)
BUN BLDV-MCNC: 11 MG/DL (ref 6–23)
CALCIUM SERPL-MCNC: 8.2 MG/DL (ref 8.3–10.6)
CHLORIDE BLD-SCNC: 102 MMOL/L (ref 99–110)
CO2: 27 MMOL/L (ref 21–32)
CREAT SERPL-MCNC: 0.4 MG/DL (ref 0.9–1.3)
DIFFERENTIAL TYPE: ABNORMAL
EOSINOPHILS ABSOLUTE: 0.2 K/CU MM
EOSINOPHILS RELATIVE PERCENT: 2.3 % (ref 0–3)
GFR AFRICAN AMERICAN: >60 ML/MIN/1.73M2
GFR NON-AFRICAN AMERICAN: >60 ML/MIN/1.73M2
GLUCOSE BLD-MCNC: 113 MG/DL (ref 70–99)
HCT VFR BLD CALC: 31 % (ref 42–52)
HEMOGLOBIN: 10.5 GM/DL (ref 13.5–18)
IMMATURE NEUTROPHIL %: 0.5 % (ref 0–0.43)
INR BLD: 1.23 INDEX
LYMPHOCYTES ABSOLUTE: 0.9 K/CU MM
LYMPHOCYTES RELATIVE PERCENT: 9.8 % (ref 24–44)
MAGNESIUM: 2 MG/DL (ref 1.8–2.4)
MCH RBC QN AUTO: 34.5 PG (ref 27–31)
MCHC RBC AUTO-ENTMCNC: 33.9 % (ref 32–36)
MCV RBC AUTO: 102 FL (ref 78–100)
MONOCYTES ABSOLUTE: 0.5 K/CU MM
MONOCYTES RELATIVE PERCENT: 5 % (ref 0–4)
NUCLEATED RBC %: 0 %
PDW BLD-RTO: 13.5 % (ref 11.7–14.9)
PLATELET # BLD: 234 K/CU MM (ref 140–440)
PMV BLD AUTO: 10.3 FL (ref 7.5–11.1)
POTASSIUM SERPL-SCNC: 3.5 MMOL/L (ref 3.5–5.1)
PROTHROMBIN TIME: 14.9 SECONDS (ref 11.7–14.5)
RBC # BLD: 3.04 M/CU MM (ref 4.6–6.2)
SEGMENTED NEUTROPHILS ABSOLUTE COUNT: 7.6 K/CU MM
SEGMENTED NEUTROPHILS RELATIVE PERCENT: 82.2 % (ref 36–66)
SODIUM BLD-SCNC: 137 MMOL/L (ref 135–145)
TOTAL IMMATURE NEUTOROPHIL: 0.05 K/CU MM
TOTAL NUCLEATED RBC: 0 K/CU MM
TOTAL PROTEIN: 5.3 GM/DL (ref 6.4–8.2)
WBC # BLD: 9.2 K/CU MM (ref 4–10.5)

## 2020-05-05 PROCEDURE — 1200000000 HC SEMI PRIVATE

## 2020-05-05 PROCEDURE — 94761 N-INVAS EAR/PLS OXIMETRY MLT: CPT

## 2020-05-05 PROCEDURE — 97530 THERAPEUTIC ACTIVITIES: CPT

## 2020-05-05 PROCEDURE — 85730 THROMBOPLASTIN TIME PARTIAL: CPT

## 2020-05-05 PROCEDURE — 2700000000 HC OXYGEN THERAPY PER DAY

## 2020-05-05 PROCEDURE — 6370000000 HC RX 637 (ALT 250 FOR IP): Performed by: SURGERY

## 2020-05-05 PROCEDURE — 97535 SELF CARE MNGMENT TRAINING: CPT

## 2020-05-05 PROCEDURE — 85610 PROTHROMBIN TIME: CPT

## 2020-05-05 PROCEDURE — 2580000003 HC RX 258: Performed by: SURGERY

## 2020-05-05 PROCEDURE — 6370000000 HC RX 637 (ALT 250 FOR IP): Performed by: INTERNAL MEDICINE

## 2020-05-05 PROCEDURE — 83735 ASSAY OF MAGNESIUM: CPT

## 2020-05-05 PROCEDURE — 80048 BASIC METABOLIC PNL TOTAL CA: CPT

## 2020-05-05 PROCEDURE — 6360000002 HC RX W HCPCS: Performed by: INTERNAL MEDICINE

## 2020-05-05 PROCEDURE — 99024 POSTOP FOLLOW-UP VISIT: CPT | Performed by: SURGERY

## 2020-05-05 PROCEDURE — 85025 COMPLETE CBC W/AUTO DIFF WBC: CPT

## 2020-05-05 PROCEDURE — 80053 COMPREHEN METABOLIC PANEL: CPT

## 2020-05-05 PROCEDURE — 36415 COLL VENOUS BLD VENIPUNCTURE: CPT

## 2020-05-05 PROCEDURE — 97167 OT EVAL HIGH COMPLEX 60 MIN: CPT

## 2020-05-05 RX ORDER — POLYETHYLENE GLYCOL 3350 17 G/17G
17 POWDER, FOR SOLUTION ORAL DAILY
Status: DISCONTINUED | OUTPATIENT
Start: 2020-05-05 | End: 2020-05-06 | Stop reason: HOSPADM

## 2020-05-05 RX ORDER — DOCUSATE SODIUM 100 MG/1
100 CAPSULE, LIQUID FILLED ORAL 2 TIMES DAILY
Status: DISCONTINUED | OUTPATIENT
Start: 2020-05-05 | End: 2020-05-06 | Stop reason: HOSPADM

## 2020-05-05 RX ADMIN — MECLIZINE 12.5 MG: 12.5 TABLET ORAL at 12:35

## 2020-05-05 RX ADMIN — SODIUM CHLORIDE, PRESERVATIVE FREE 10 ML: 5 INJECTION INTRAVENOUS at 10:19

## 2020-05-05 RX ADMIN — AMLODIPINE BESYLATE 5 MG: 5 TABLET ORAL at 10:12

## 2020-05-05 RX ADMIN — CELECOXIB 200 MG: 200 CAPSULE ORAL at 12:35

## 2020-05-05 RX ADMIN — DOCUSATE SODIUM 100 MG: 100 CAPSULE, LIQUID FILLED ORAL at 21:39

## 2020-05-05 RX ADMIN — SODIUM CHLORIDE, PRESERVATIVE FREE 10 ML: 5 INJECTION INTRAVENOUS at 21:38

## 2020-05-05 RX ADMIN — ENOXAPARIN SODIUM 40 MG: 40 INJECTION SUBCUTANEOUS at 10:11

## 2020-05-05 RX ADMIN — Medication 5000 UNITS: at 10:11

## 2020-05-05 RX ADMIN — GABAPENTIN 800 MG: 400 CAPSULE ORAL at 22:04

## 2020-05-05 RX ADMIN — GABAPENTIN 800 MG: 400 CAPSULE ORAL at 15:09

## 2020-05-05 RX ADMIN — CETIRIZINE HYDROCHLORIDE 10 MG: 10 TABLET, FILM COATED ORAL at 10:12

## 2020-05-05 RX ADMIN — MECLIZINE 12.5 MG: 12.5 TABLET ORAL at 21:38

## 2020-05-05 RX ADMIN — WARFARIN SODIUM 5 MG: 2.5 TABLET ORAL at 18:38

## 2020-05-05 RX ADMIN — GUAIFENESIN 1200 MG: 600 TABLET, EXTENDED RELEASE ORAL at 10:11

## 2020-05-05 RX ADMIN — GABAPENTIN 800 MG: 400 CAPSULE ORAL at 10:11

## 2020-05-05 RX ADMIN — GUAIFENESIN 1200 MG: 600 TABLET, EXTENDED RELEASE ORAL at 21:38

## 2020-05-05 RX ADMIN — TAMSULOSIN HYDROCHLORIDE 0.4 MG: 0.4 CAPSULE ORAL at 10:12

## 2020-05-05 RX ADMIN — SODIUM CHLORIDE, PRESERVATIVE FREE 10 ML: 5 INJECTION INTRAVENOUS at 21:43

## 2020-05-05 RX ADMIN — DOCUSATE SODIUM 100 MG: 100 CAPSULE, LIQUID FILLED ORAL at 15:10

## 2020-05-05 RX ADMIN — PANTOPRAZOLE SODIUM 40 MG: 40 TABLET, DELAYED RELEASE ORAL at 06:11

## 2020-05-05 RX ADMIN — POLYETHYLENE GLYCOL (3350) 17 G: 17 POWDER, FOR SOLUTION ORAL at 15:09

## 2020-05-05 RX ADMIN — ATORVASTATIN CALCIUM 10 MG: 10 TABLET, FILM COATED ORAL at 10:12

## 2020-05-05 RX ADMIN — AMITRIPTYLINE HYDROCHLORIDE 25 MG: 25 TABLET, FILM COATED ORAL at 21:38

## 2020-05-05 RX ADMIN — FERROUS SULFATE TAB 325 MG (65 MG ELEMENTAL FE) 325 MG: 325 (65 FE) TAB at 10:12

## 2020-05-05 RX ADMIN — FINASTERIDE 5 MG: 5 TABLET, FILM COATED ORAL at 10:12

## 2020-05-05 ASSESSMENT — PAIN DESCRIPTION - PROGRESSION
CLINICAL_PROGRESSION: GRADUALLY IMPROVING

## 2020-05-05 ASSESSMENT — PAIN SCALES - GENERAL
PAINLEVEL_OUTOF10: 0
PAINLEVEL_OUTOF10: 0

## 2020-05-05 NOTE — PLAN OF CARE
Problem: Falls - Risk of:  Goal: Will remain free from falls  Description: Will remain free from falls  Outcome: Ongoing  Goal: Absence of physical injury  Description: Absence of physical injury  Outcome: Ongoing     Problem: Pain:  Goal: Pain level will decrease  Description: Pain level will decrease  Outcome: Ongoing  Goal: Control of acute pain  Description: Control of acute pain  Outcome: Ongoing  Goal: Control of chronic pain  Description: Control of chronic pain  Outcome: Ongoing     Problem: Infection:  Goal: Will remain free from infection  Description: Will remain free from infection  Outcome: Ongoing     Problem: Safety:  Goal: Free from accidental physical injury  Description: Free from accidental physical injury  Outcome: Ongoing  Goal: Free from intentional harm  Description: Free from intentional harm  Outcome: Ongoing     Problem: Daily Care:  Goal: Daily care needs are met  Description: Daily care needs are met  Outcome: Ongoing     Problem: Skin Integrity:  Goal: Skin integrity will stabilize  Description: Skin integrity will stabilize  Outcome: Ongoing     Problem: Discharge Planning:  Goal: Patients continuum of care needs are met  Description: Patients continuum of care needs are met  Outcome: Ongoing     Problem: Nutrition  Goal: Optimal nutrition therapy  Outcome: Ongoing

## 2020-05-05 NOTE — PROGRESS NOTES
mg Oral TID    guaiFENesin  1,200 mg Oral BID    cetirizine  10 mg Oral Daily    meclizine  12.5 mg Oral BID    pantoprazole  40 mg Oral QAM AC    tamsulosin  0.4 mg Oral Daily    sodium chloride flush  10 mL Intravenous 2 times per day      Infusions:   PRN Meds: HYDROcodone 5 mg - acetaminophen, 1 tablet, Q4H PRN    Or  HYDROcodone 5 mg - acetaminophen, 2 tablet, Q4H PRN  sodium chloride flush, 10 mL, PRN  sodium chloride flush, 10 mL, PRN  promethazine, 12.5 mg, Q6H PRN    Or  ondansetron, 4 mg, Q6H PRN  polyethylene glycol, 17 g, Daily PRN  sodium chloride flush, 10 mL, PRN  acetaminophen, 650 mg, Q6H PRN    Or  acetaminophen, 650 mg, Q6H PRN  magnesium hydroxide, 30 mL, Daily PRN  promethazine, 12.5 mg, Q6H PRN    Or  ondansetron, 4 mg, Q6H PRN          Electronically signed by Johanna Espinoza MD on 5/5/2020 at 3:30 PM

## 2020-05-05 NOTE — PROGRESS NOTES
sequence, UE/LE placement, awareness, and balance. Activities performed today included bed mobility training, sup-sit, sit-stand x 2 trials, stand to sit, sit to supine, scooting to [facility]      Safety: patient left in bed with bed alarm, call light within reach, RN notified, gait belt used. Assessment:  Pt is a 76 yo male admitted from home for choledocholithiasis. Pt currently presents w/ deficits in ADL and high level IADL independence, functional activity tolerance, dynamic sitting and standing balance and tolerance and functional transfers, BUE strength and cognition. Pt would benefit from continued acute care OT services w/ discharge to SNF  Complexity: High  Prognosis: Good, no significant barriers to participation at this time.    Plan  Times per week: 2x+  Times per day: Daily  Current Treatment Recommendations: Strengthening, Endurance Training, Patient/Caregiver Education & Training, Positioning, Equipment Evaluation, Education, & procurement, Balance Training, Pain Management, Cognitive Reorientation, Self-Care / ADL, Functional Mobility Training, Safety Education & Training, Cognitive/Perceptual Training, Home Management Training     Equipment: defer    Goals:  Pt goal: go home  Time Frame for STGs: discharge  Goal 1: Pt will perform UE ADLs SBA  Goal 2: Pt will perform LE ADLs CGA w/ AD  Goal 3: Pt will perform toileting CGA  Goal 4: Pt will perform functional transfer w/ AD CGA  Goal 5: Pt will perform functional mobility w/ AD CGA  Goal 6: Pt will perform therex/theract in order to increase functional activity tolerance and dynamic standing balance    Treatment plan:  Pt will perform functional task in stand reaching in all 3 planes in order to increase dynamic standing balance and functional activity tolerance    Recommendations for NURSING activity: BRIGITTE to chair    Time:   Time in: 0959  Time out: 1057  Timed treatment minutes: 53 minutes  Total time: 58 minutes    Electronically signed by:

## 2020-05-06 VITALS
OXYGEN SATURATION: 84 % | HEART RATE: 78 BPM | DIASTOLIC BLOOD PRESSURE: 71 MMHG | TEMPERATURE: 97.9 F | SYSTOLIC BLOOD PRESSURE: 152 MMHG | RESPIRATION RATE: 19 BRPM | HEIGHT: 70 IN | BODY MASS INDEX: 28.06 KG/M2 | WEIGHT: 196 LBS

## 2020-05-06 LAB
ALBUMIN SERPL-MCNC: 2.7 GM/DL (ref 3.4–5)
ALP BLD-CCNC: 102 IU/L (ref 40–128)
ALT SERPL-CCNC: 28 U/L (ref 10–40)
ANION GAP SERPL CALCULATED.3IONS-SCNC: 11 MMOL/L (ref 4–16)
APTT: 54.1 SECONDS (ref 25.1–37.1)
AST SERPL-CCNC: 26 IU/L (ref 15–37)
BASOPHILS ABSOLUTE: 0 K/CU MM
BASOPHILS RELATIVE PERCENT: 0.3 % (ref 0–1)
BILIRUB SERPL-MCNC: 1.6 MG/DL (ref 0–1)
BUN BLDV-MCNC: 9 MG/DL (ref 6–23)
CALCIUM SERPL-MCNC: 8.5 MG/DL (ref 8.3–10.6)
CHLORIDE BLD-SCNC: 106 MMOL/L (ref 99–110)
CO2: 26 MMOL/L (ref 21–32)
CREAT SERPL-MCNC: 0.5 MG/DL (ref 0.9–1.3)
DIFFERENTIAL TYPE: ABNORMAL
EOSINOPHILS ABSOLUTE: 0.3 K/CU MM
EOSINOPHILS RELATIVE PERCENT: 3.3 % (ref 0–3)
GFR AFRICAN AMERICAN: >60 ML/MIN/1.73M2
GFR NON-AFRICAN AMERICAN: >60 ML/MIN/1.73M2
GLUCOSE BLD-MCNC: 137 MG/DL (ref 70–99)
HCT VFR BLD CALC: 29.5 % (ref 42–52)
HEMOGLOBIN: 10.2 GM/DL (ref 13.5–18)
IMMATURE NEUTROPHIL %: 0.6 % (ref 0–0.43)
INR BLD: 1.38 INDEX
LYMPHOCYTES ABSOLUTE: 1.1 K/CU MM
LYMPHOCYTES RELATIVE PERCENT: 14.4 % (ref 24–44)
MAGNESIUM: 2.1 MG/DL (ref 1.8–2.4)
MCH RBC QN AUTO: 36.6 PG (ref 27–31)
MCHC RBC AUTO-ENTMCNC: 34.6 % (ref 32–36)
MCV RBC AUTO: 105.7 FL (ref 78–100)
MONOCYTES ABSOLUTE: 0.6 K/CU MM
MONOCYTES RELATIVE PERCENT: 7.9 % (ref 0–4)
NUCLEATED RBC %: 0 %
PDW BLD-RTO: 14.8 % (ref 11.7–14.9)
PLATELET # BLD: 253 K/CU MM (ref 140–440)
PMV BLD AUTO: 10.3 FL (ref 7.5–11.1)
POTASSIUM SERPL-SCNC: 3.7 MMOL/L (ref 3.5–5.1)
PROTHROMBIN TIME: 16.7 SECONDS (ref 11.7–14.5)
RBC # BLD: 2.79 M/CU MM (ref 4.6–6.2)
SEGMENTED NEUTROPHILS ABSOLUTE COUNT: 5.8 K/CU MM
SEGMENTED NEUTROPHILS RELATIVE PERCENT: 73.5 % (ref 36–66)
SODIUM BLD-SCNC: 143 MMOL/L (ref 135–145)
TOTAL IMMATURE NEUTOROPHIL: 0.05 K/CU MM
TOTAL NUCLEATED RBC: 0 K/CU MM
TOTAL PROTEIN: 5.2 GM/DL (ref 6.4–8.2)
WBC # BLD: 7.9 K/CU MM (ref 4–10.5)

## 2020-05-06 PROCEDURE — 6370000000 HC RX 637 (ALT 250 FOR IP): Performed by: SURGERY

## 2020-05-06 PROCEDURE — 83735 ASSAY OF MAGNESIUM: CPT

## 2020-05-06 PROCEDURE — 85610 PROTHROMBIN TIME: CPT

## 2020-05-06 PROCEDURE — 2700000000 HC OXYGEN THERAPY PER DAY

## 2020-05-06 PROCEDURE — 2580000003 HC RX 258: Performed by: SURGERY

## 2020-05-06 PROCEDURE — 80048 BASIC METABOLIC PNL TOTAL CA: CPT

## 2020-05-06 PROCEDURE — 85025 COMPLETE CBC W/AUTO DIFF WBC: CPT

## 2020-05-06 PROCEDURE — 94761 N-INVAS EAR/PLS OXIMETRY MLT: CPT

## 2020-05-06 PROCEDURE — 80053 COMPREHEN METABOLIC PANEL: CPT

## 2020-05-06 PROCEDURE — 6360000002 HC RX W HCPCS: Performed by: INTERNAL MEDICINE

## 2020-05-06 PROCEDURE — 85730 THROMBOPLASTIN TIME PARTIAL: CPT

## 2020-05-06 RX ADMIN — MECLIZINE 12.5 MG: 12.5 TABLET ORAL at 08:06

## 2020-05-06 RX ADMIN — CELECOXIB 200 MG: 200 CAPSULE ORAL at 08:06

## 2020-05-06 RX ADMIN — GUAIFENESIN 1200 MG: 600 TABLET, EXTENDED RELEASE ORAL at 08:06

## 2020-05-06 RX ADMIN — ATORVASTATIN CALCIUM 10 MG: 10 TABLET, FILM COATED ORAL at 08:06

## 2020-05-06 RX ADMIN — GABAPENTIN 800 MG: 400 CAPSULE ORAL at 13:15

## 2020-05-06 RX ADMIN — ENOXAPARIN SODIUM 40 MG: 40 INJECTION SUBCUTANEOUS at 08:06

## 2020-05-06 RX ADMIN — FINASTERIDE 5 MG: 5 TABLET, FILM COATED ORAL at 08:06

## 2020-05-06 RX ADMIN — AMLODIPINE BESYLATE 5 MG: 5 TABLET ORAL at 08:07

## 2020-05-06 RX ADMIN — FERROUS SULFATE TAB 325 MG (65 MG ELEMENTAL FE) 325 MG: 325 (65 FE) TAB at 08:06

## 2020-05-06 RX ADMIN — CETIRIZINE HYDROCHLORIDE 10 MG: 10 TABLET, FILM COATED ORAL at 08:06

## 2020-05-06 RX ADMIN — DOCUSATE SODIUM 100 MG: 100 CAPSULE, LIQUID FILLED ORAL at 08:06

## 2020-05-06 RX ADMIN — Medication 5000 UNITS: at 08:06

## 2020-05-06 RX ADMIN — PANTOPRAZOLE SODIUM 40 MG: 40 TABLET, DELAYED RELEASE ORAL at 06:10

## 2020-05-06 RX ADMIN — TAMSULOSIN HYDROCHLORIDE 0.4 MG: 0.4 CAPSULE ORAL at 08:06

## 2020-05-06 RX ADMIN — SODIUM CHLORIDE, PRESERVATIVE FREE 10 ML: 5 INJECTION INTRAVENOUS at 08:06

## 2020-05-06 ASSESSMENT — PAIN DESCRIPTION - PROGRESSION

## 2020-05-06 ASSESSMENT — PAIN SCALES - GENERAL
PAINLEVEL_OUTOF10: 0

## 2020-05-06 NOTE — DISCHARGE SUMMARY
mouth Daily             polyethylene glycol (GLYCOLAX) packet  Take 17 g by mouth daily as needed for Constipation             Sodium Bicarbonate-Citric Acid (LEODAN-SELTZER HEARTBURN PO)  Take 2 tablets by mouth daily as needed             tamsulosin (FLOMAX) 0.4 MG capsule  Take 0.4 mg by mouth daily             terbinafine (LAMISIL) 1 % cream  Apply topically 2 times daily Apply topically 2 times daily. warfarin (COUMADIN) 2.5 MG tablet  Take 6.5 mg by mouth daily                 Subjective _patient is resting in bed with no distress, he is tolerating solid diet with no issues    Objective Findings at Discharge:   BP (!) 152/71   Pulse 78   Temp 97.9 °F (36.6 °C) (Oral)   Resp 19   Ht 5' 10\" (1.778 m)   Wt 196 lb (88.9 kg)   SpO2 (!) 84% Comment: wean trial  BMI 28.12 kg/m²            PHYSICAL EXAM   GEN Awake male, resting in bed in no apparent distress. Appears given age. RESP Clear to auscultation, no wheezes, rales or rhonchi. CARDIO/VAS - S1/S2 auscultated. Regular rate without appreciable murmurs, rubs, or gallops. Peripheral pulses equal bilaterally and palpable. No peripheral edema. GI CLARENCE drain in place right quadrant, abdomen is soft without significant tenderness, masses, or guarding.  Bowel sounds are normoactive  MSK Right BKA   BMP/CBC  Recent Labs     05/04/20  0317 05/05/20  0417 05/06/20  0136    137 143   K 3.8 3.5 3.7    102 106   CO2 25 27 26   BUN 14 11 9   CREATININE 0.5* 0.4* 0.5*   WBC 9.1 9.2 7.9   HCT 34.0* 31.0* 29.5*    234 253         Discharge Time of 36 minutes    Electronically signed by Ford Gonsalves MD on 5/6/2020 at 12:20 PM

## 2020-05-06 NOTE — PROGRESS NOTES
Physical Therapy  \"Let do one thing at a time\", patient waiting for his breakfast, doesn't want to get up until after breakfast, offered to assist patient to chair in order to eat breakfast sitting, continued to decline service.

## 2020-05-06 NOTE — PROGRESS NOTES
Attempted to wean pt from 02. Pt desat to 84% on room air at rest after 3 minutes of O2 removed. Replaced on 1L and pt recovered to 94%.

## 2020-05-06 NOTE — PROGRESS NOTES
Attempted to call report. This time leaving a message for the nursing surpervisor with my call back number. Faxed AVS. 1N charge notified. Pt already picked up by transport.

## 2020-05-13 ENCOUNTER — HOSPITAL ENCOUNTER (OUTPATIENT)
Dept: CT IMAGING | Age: 75
Discharge: HOME | End: 2020-05-13
Payer: MEDICARE

## 2020-05-13 PROCEDURE — 71250 CT THORAX DX C-: CPT

## 2020-05-26 ENCOUNTER — OFFICE VISIT (OUTPATIENT)
Dept: SURGERY | Age: 75
End: 2020-05-26

## 2020-05-26 VITALS
WEIGHT: 195 LBS | TEMPERATURE: 98.3 F | HEART RATE: 82 BPM | SYSTOLIC BLOOD PRESSURE: 138 MMHG | HEIGHT: 70 IN | BODY MASS INDEX: 27.92 KG/M2 | DIASTOLIC BLOOD PRESSURE: 62 MMHG | RESPIRATION RATE: 14 BRPM | OXYGEN SATURATION: 97 %

## 2020-05-26 PROCEDURE — 99024 POSTOP FOLLOW-UP VISIT: CPT | Performed by: SURGERY

## 2020-05-26 ASSESSMENT — ENCOUNTER SYMPTOMS
ABDOMINAL DISTENTION: 0
EYE DISCHARGE: 0
EYE REDNESS: 0
ABDOMINAL PAIN: 0
COLOR CHANGE: 0
SHORTNESS OF BREATH: 0
CHEST TIGHTNESS: 0
NAUSEA: 0
DIARRHEA: 0
BACK PAIN: 1
CONSTIPATION: 0
VOMITING: 0
SORE THROAT: 0

## 2020-05-26 NOTE — PATIENT INSTRUCTIONS
You will be called to arrange for pre-procedure testing (if applicable) and to schedule your procedure. Please call the surgery office at [phone] if you do not receive a call. You will be called with a day and time for your procedure. One week prior to the procedure, stop taking your Coumadin, and start doing the Lovenox shots (per prescription instructions). You will need to have a COVID-19 test before your procedure. You will be contacted by my office with instructions, and will get the test approximately 96 hours before your procedure. After the test is done, please self-quarantine after the test is performed until your procedure.

## 2020-05-26 NOTE — PROGRESS NOTES
daily as needed for Constipation      terbinafine (LAMISIL) 1 % cream Apply topically 2 times daily Apply topically 2 times daily. No current facility-administered medications for this visit. Allergies:  Sulfa antibiotics and Tramadol    Social History:   Social History     Socioeconomic History    Marital status:      Spouse name: None    Number of children: None    Years of education: None    Highest education level: None   Occupational History    None   Social Needs    Financial resource strain: None    Food insecurity     Worry: None     Inability: None    Transportation needs     Medical: None     Non-medical: None   Tobacco Use    Smoking status: Former Smoker    Smokeless tobacco: Never Used   Substance and Sexual Activity    Alcohol use: Never     Frequency: Never    Drug use: Never    Sexual activity: None   Lifestyle    Physical activity     Days per week: None     Minutes per session: None    Stress: None   Relationships    Social connections     Talks on phone: None     Gets together: None     Attends Orthodoxy service: None     Active member of club or organization: None     Attends meetings of clubs or organizations: None     Relationship status: None    Intimate partner violence     Fear of current or ex partner: None     Emotionally abused: None     Physically abused: None     Forced sexual activity: None   Other Topics Concern    None   Social History Narrative    None       Family History:   No family history on file. REVIEW OF SYSTEMS:    Review of Systems   Constitutional: Negative for chills and fever. HENT: Negative for congestion and sore throat. Eyes: Negative for discharge and redness. Respiratory: Negative for chest tightness and shortness of breath. Cardiovascular: Negative for chest pain and palpitations. Gastrointestinal: Negative for abdominal distention, abdominal pain, constipation, diarrhea, nausea and vomiting.    Genitourinary: Negative for dysuria and flank pain. Musculoskeletal: Positive for arthralgias (arthritis) and back pain (chronic). Negative for myalgias. Skin: Negative for color change and rash. Neurological: Positive for dizziness. Negative for numbness. Psychiatric/Behavioral: Negative for confusion. The patient is not nervous/anxious. I have reviewed the patient's information pertinent to this visit, including medical history, family history, social history and review of systems. PHYSICAL EXAM:    Vitals:    05/26/20 1520   BP: 138/62   Site: Left Upper Arm   Position: Sitting   Cuff Size: Medium Adult   Pulse: 82   Resp: 14   Temp: 98.3 °F (36.8 °C)   TempSrc: Infrared   SpO2: 97%   Weight: 195 lb (88.5 kg)   Height: 5' 10\" (1.778 m)     Physical Exam  Constitutional:       General: He is not in acute distress. Appearance: He is well-developed. He is not diaphoretic. HENT:      Head: Normocephalic and atraumatic. Eyes:      General:         Right eye: No discharge. Left eye: No discharge. Pupils: Pupils are equal, round, and reactive to light. Neck:      Musculoskeletal: Neck supple. Trachea: No tracheal deviation. Cardiovascular:      Rate and Rhythm: Normal rate and regular rhythm. Pulmonary:      Effort: Pulmonary effort is normal. No respiratory distress. Breath sounds: No wheezing. Abdominal:      General: There is no distension. Palpations: Abdomen is soft. Tenderness: There is no abdominal tenderness. There is no guarding or rebound. Comments: Well healed laparoscopic incisions. RUQ drain with trace serous output. Removed without complication. Drain appeared intact. Dressing applied. The patient tolerated drain removal without apparent complication. Musculoskeletal:         General: No tenderness or deformity. 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 Findings: No rash.    Neurological: Mental Status: He is alert and oriented to person, place, and time. Psychiatric:         Behavior: Behavior normal.         DATA:    Lab Results   Component Value Date    WBC 7.9 05/06/2020    HGB 10.2 (L) 05/06/2020    HCT 29.5 (L) 05/06/2020     05/06/2020     05/06/2020    K 3.7 05/06/2020     05/06/2020    CO2 26 05/06/2020    BUN 9 05/06/2020    CREATININE 0.5 (L) 05/06/2020    GLUCOSE 137 (H) 05/06/2020    CALCIUM 8.5 05/06/2020    PROT 5.2 (L) 05/06/2020    BILITOT 1.6 (H) 05/06/2020    AST 26 05/06/2020    ALT 28 05/06/2020    ALKPHOS 102 05/06/2020    AMYLASE 5 (L) 05/03/2020    LIPASE 13 05/03/2020    INR 1.38 05/06/2020       Imaging:  Pertinent laboratory and imaging studies were personally reviewed if available. IMPRESSION:    Kayden Sharp is a 75 y.o. male with history of acute cholecystitis, biliary obstruction, s/p laparoscopic cholecystectomy with IOC and drain placement; postoperativel bile leak treated with ERCP, placement of 7F x 7cm biliary stent    1. Postoperative bile leak    2. History of pulmonary embolism    3. Encounter for screening for respiratory disorder      Patient Active Problem List    Diagnosis Date Noted    Postoperative bile leak 05/27/2020    Choledocholithiasis with obstruction 04/27/2020    Cholelithiasis with obstruction 04/27/2020    Elevated liver enzymes 02/02/2020    Jaundice 01/28/2020     PLAN:  · Discussed findings and options with Kayden Sharp. His temporary biliary stent needs removed versus exchanged via ERCP approximately 4-6 weeks post-placement (5/3/20). He has not heard anything from Dr. Eneida Mancera office about follow up or scheduling this with him, and is comfortable scheduling it with me. Will aim for late next week depending on scheduling availability. · He stays at [facility]  · On Coumadin. Will prescribe a Lovenox bridge.  Stop Coumadin 1 week prior to the procedure, and use Lovenox BID until the

## 2020-05-27 PROBLEM — K83.8 POSTOPERATIVE BILE LEAK: Status: ACTIVE | Noted: 2020-05-27

## 2020-05-27 PROBLEM — K91.89 POSTOPERATIVE BILE LEAK: Status: ACTIVE | Noted: 2020-05-27

## 2020-05-27 RX ORDER — SODIUM CHLORIDE, SODIUM LACTATE, POTASSIUM CHLORIDE, CALCIUM CHLORIDE 600; 310; 30; 20 MG/100ML; MG/100ML; MG/100ML; MG/100ML
INJECTION, SOLUTION INTRAVENOUS CONTINUOUS
Status: CANCELLED | OUTPATIENT
Start: 2020-05-27

## 2020-05-27 RX ORDER — SODIUM CHLORIDE 0.9 % (FLUSH) 0.9 %
10 SYRINGE (ML) INJECTION EVERY 12 HOURS SCHEDULED
Status: CANCELLED | OUTPATIENT
Start: 2020-05-27

## 2020-05-27 RX ORDER — SODIUM CHLORIDE 0.9 % (FLUSH) 0.9 %
10 SYRINGE (ML) INJECTION PRN
Status: CANCELLED | OUTPATIENT
Start: 2020-05-27

## 2020-06-02 ENCOUNTER — TELEPHONE (OUTPATIENT)
Dept: SURGERY | Age: 75
End: 2020-06-02

## 2020-06-11 ENCOUNTER — HOSPITAL ENCOUNTER (OUTPATIENT)
Dept: CT IMAGING | Age: 75
Discharge: HOME | End: 2020-06-11
Payer: MEDICARE

## 2020-06-11 PROCEDURE — 71250 CT THORAX DX C-: CPT

## 2020-06-17 ENCOUNTER — HOSPITAL ENCOUNTER (OUTPATIENT)
Age: 75
Discharge: HOME | End: 2020-06-17
Payer: MEDICARE

## 2020-06-17 LAB
ALBUMIN SERPL-MCNC: 4.2 GM/DL (ref 3.4–5)
ALP BLD-CCNC: 77 IU/L (ref 40–128)
ALT SERPL-CCNC: 17 U/L (ref 10–40)
ANION GAP SERPL CALCULATED.3IONS-SCNC: 10 MMOL/L (ref 4–16)
AST SERPL-CCNC: 17 IU/L (ref 15–37)
BILIRUB SERPL-MCNC: 1.4 MG/DL (ref 0–1)
BUN BLDV-MCNC: 11 MG/DL (ref 6–23)
CALCIUM SERPL-MCNC: 9.2 MG/DL (ref 8.3–10.6)
CHLORIDE BLD-SCNC: 103 MMOL/L (ref 99–110)
CO2: 25 MMOL/L (ref 21–32)
CREAT SERPL-MCNC: 0.7 MG/DL (ref 0.9–1.3)
GFR AFRICAN AMERICAN: >60 ML/MIN/1.73M2
GFR NON-AFRICAN AMERICAN: >60 ML/MIN/1.73M2
GLUCOSE BLD-MCNC: 154 MG/DL (ref 70–99)
HCT VFR BLD CALC: 25.9 % (ref 42–52)
HEMOGLOBIN: 12.9 GM/DL (ref 13.5–18)
MCH RBC QN AUTO: 59.7 PG (ref 27–31)
MCHC RBC AUTO-ENTMCNC: 49.8 % (ref 32–36)
MCV RBC AUTO: 119.9 FL (ref 78–100)
PLATELET # BLD: 184 K/CU MM (ref 140–440)
PMV BLD AUTO: 10.7 FL (ref 7.5–11.1)
POTASSIUM SERPL-SCNC: 4.4 MMOL/L (ref 3.5–5.1)
RBC # BLD: 2.16 M/CU MM (ref 4.6–6.2)
SODIUM BLD-SCNC: 138 MMOL/L (ref 135–145)
TOTAL PROTEIN: 6.9 GM/DL (ref 6.4–8.2)
WBC # BLD: 3.6 K/CU MM (ref 4–10.5)

## 2020-06-17 PROCEDURE — 80053 COMPREHEN METABOLIC PANEL: CPT

## 2020-06-17 PROCEDURE — U0002 COVID-19 LAB TEST NON-CDC: HCPCS

## 2020-06-17 PROCEDURE — 36415 COLL VENOUS BLD VENIPUNCTURE: CPT

## 2020-06-17 PROCEDURE — 85027 COMPLETE CBC AUTOMATED: CPT

## 2020-06-18 ENCOUNTER — ANESTHESIA EVENT (OUTPATIENT)
Dept: ENDOSCOPY | Age: 75
End: 2020-06-18
Payer: MEDICARE

## 2020-06-18 LAB
SARS-COV-2: NOT DETECTED
SOURCE: NORMAL

## 2020-06-18 RX ORDER — ACETAMINOPHEN 325 MG/1
650 TABLET ORAL EVERY 4 HOURS PRN
COMMUNITY

## 2020-06-18 RX ORDER — SODIUM CHLORIDE FOR INHALATION 0.9 %
3 VIAL, NEBULIZER (ML) INHALATION DAILY
COMMUNITY

## 2020-06-18 NOTE — ANESTHESIA PRE PROCEDURE
Department of Anesthesiology  Preprocedure Note       Name:  Leonard Rosario   Age:  67 y.o.  :  1945                                          MRN:  [id number]         Date:  2020      Surgeon: Kassi Britton):  Tony Mitchell MD    Procedure: Procedure(s):  ERCP FOREIGN BODY REMOVAL (STENT REMOVAL VS EXCHANGE)    Medications prior to admission:   Prior to Admission medications    Medication Sig Start Date End Date Taking?  Authorizing Provider   acetaminophen (TYLENOL) 325 MG tablet Take 650 mg by mouth every 4 hours as needed for Pain 2 tablets   Yes Historical Provider, MD   sodium chloride nebulizer 0.9 % solution 3 mLs daily   Yes Historical Provider, MD   tuberculin 5 UNIT/0.1ML injection Inject 5 Units into the skin once Every 12 months   Yes Historical Provider, MD   Enoxaparin Sodium (LOVENOX IJ) Inject as directed 2 times daily    Yes Historical Provider, MD   warfarin (COUMADIN) 2.5 MG tablet Take 3 mg by mouth daily Give 3 tablet orally one time a day    Historical Provider, MD   amitriptyline (ELAVIL) 25 MG tablet Take 25 mg by mouth nightly    Historical Provider, MD   amLODIPine (NORVASC) 5 MG tablet Take 5 mg by mouth daily    Historical Provider, MD   atorvastatin (LIPITOR) 10 MG tablet Take 10 mg by mouth daily    Historical Provider, MD   bisacodyl (DULCOLAX) 5 MG EC tablet Take 10 mg by mouth daily    Historical Provider, MD   celecoxib (CELEBREX) 200 MG capsule Take 200 mg by mouth daily    Historical Provider, MD   ferrous sulfate 325 (65 Fe) MG EC tablet Take 325 mg by mouth daily (with breakfast)    Historical Provider, MD   finasteride (PROSCAR) 5 MG tablet Take 5 mg by mouth daily    Historical Provider, MD   loratadine (CLARITIN) 10 MG capsule Take 10 mg by mouth daily    Historical Provider, MD   omeprazole (PRILOSEC) 20 MG delayed release capsule Take 20 mg by mouth Daily    Historical Provider, MD   tamsulosin (FLOMAX) 0.4 MG capsule Take 0.4 mg by mouth daily Historical Provider, MD   Cholecalciferol (VITAMIN D3) 125 MCG (5000 UT) TABS Take 5,000 Units by mouth daily    Historical Provider, MD   meclizine (ANTIVERT) 12.5 MG tablet Take 12.5 mg by mouth 2 times daily    Historical Provider, MD   guaiFENesin (MUCINEX) 600 MG extended release tablet Take 600 mg by mouth 2 times daily     Historical Provider, MD   gabapentin (NEURONTIN) 400 MG capsule Take 800 mg by mouth 3 times daily. Historical Provider, MD   magnesium hydroxide (MILK OF MAGNESIA) 400 MG/5ML suspension Take 30 mLs by mouth daily as needed for Constipation    Historical Provider, MD   polyethylene glycol (GLYCOLAX) packet Take 17 g by mouth daily as needed for Constipation    Historical Provider, MD   terbinafine (LAMISIL) 1 % cream Apply topically 2 times daily Apply topically 2 times daily. Historical Provider, MD       Current medications:    No current facility-administered medications for this encounter.       Current Outpatient Medications   Medication Sig Dispense Refill    acetaminophen (TYLENOL) 325 MG tablet Take 650 mg by mouth every 4 hours as needed for Pain 2 tablets      sodium chloride nebulizer 0.9 % solution 3 mLs daily      tuberculin 5 UNIT/0.1ML injection Inject 5 Units into the skin once Every 12 months      Enoxaparin Sodium (LOVENOX IJ) Inject as directed 2 times daily       warfarin (COUMADIN) 2.5 MG tablet Take 3 mg by mouth daily Give 3 tablet orally one time a day      amitriptyline (ELAVIL) 25 MG tablet Take 25 mg by mouth nightly      amLODIPine (NORVASC) 5 MG tablet Take 5 mg by mouth daily      atorvastatin (LIPITOR) 10 MG tablet Take 10 mg by mouth daily      bisacodyl (DULCOLAX) 5 MG EC tablet Take 10 mg by mouth daily      celecoxib (CELEBREX) 200 MG capsule Take 200 mg by mouth daily      ferrous sulfate 325 (65 Fe) MG EC tablet Take 325 mg by mouth daily (with breakfast)      finasteride (PROSCAR) 5 MG tablet Take 5 mg by mouth daily      loratadine performed by Manuel Klein MD at [facility] ERCP N/A 4/30/2020    ERCP DIAGNOSTIC performed by Manuel Klein MD at [facility] ERCP N/A 5/3/2020    ERCP STENT INSERTION of 7mm biliary stent, pd not injected performed by Axel Land MD at [address] [place] [address] History:    Social History     Tobacco Use    Smoking status: Former Smoker    Smokeless tobacco: Never Used   Substance Use Topics    Alcohol use: Never     Frequency: Never                                Counseling given: Not Answered      Vital Signs (Current): There were no vitals filed for this visit. BP Readings from Last 3 Encounters:   05/26/20 138/62   05/06/20 (!) 152/71   05/03/20 (!) 154/78       NPO Status:                                                                                 BMI:   Wt Readings from Last 3 Encounters:   05/26/20 195 lb (88.5 kg)   05/06/20 196 lb (88.9 kg)   01/31/20 178 lb (80.7 kg)     There is no height or weight on file to calculate BMI.    CBC:   Lab Results   Component Value Date    WBC 3.6 06/17/2020    RBC 2.16 06/17/2020    HGB 12.9 06/17/2020    HCT 25.9 06/17/2020    .9 06/17/2020    RDW 14.8 05/06/2020     06/17/2020       CMP:   Lab Results   Component Value Date     06/17/2020    K 4.4 06/17/2020     06/17/2020    CO2 25 06/17/2020    BUN 11 06/17/2020    CREATININE 0.7 06/17/2020    GFRAA >60 06/17/2020    LABGLOM >60 06/17/2020    GLUCOSE 154 06/17/2020    PROT 6.9 06/17/2020    CALCIUM 9.2 06/17/2020    BILITOT 1.4 06/17/2020    ALKPHOS 77 06/17/2020    AST 17 06/17/2020    ALT 17 06/17/2020       POC Tests: No results for input(s): POCGLU, POCNA, POCK, POCCL, POCBUN, POCHEMO, POCHCT in the last 72 hours.     Coags:   Lab Results   Component Value Date    PROTIME 16.7 05/06/2020    INR 1.38 05/06/2020    APTT 54.1 05/06/2020       HCG (If Applicable): No results found for: PREGTESTUR, PREGSERUM, HCG, HCGQUANT     ABGs: No results found for: PHART, PO2ART, ASR5QSU, AKO7LVW, BEART, O4EUKTAO     Type & Screen (If Applicable):  No results found for: LABABO, LABRH    Drug/Infectious Status (If Applicable):  Lab Results   Component Value Date    HEPCAB NON REACTIVE 01/28/2020       COVID-19 Screening (If Applicable): No results found for: COVID19      Anesthesia Evaluation    Airway:         Dental:          Pulmonary:                             ROS comment: Chest CT 6/2020: Impression  1. Stable 9 x 7 mm solid subpleural left lower lobe pulmonary nodule as well  as a 7 mm solid nodule along the right major fissure, dating back to  01/28/2020.  Continued follow-up is recommended per [address] to remote prior exams would be helpful if available. 2. 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 Former Smoker   Cardiovascular:    (+) hypertension:, CAD:, hyperlipidemia         Beta Blocker:  Not on Beta Blocker         Neuro/Psych:   (+) psychiatric history:depression/anxiety             GI/Hepatic/Renal:   (+) GERD:,          ROS comment: IMPRESSION:    Suresh Lane is a 75 y.o. male with history of acute cholecystitis, biliary obstruction, s/p laparoscopic cholecystectomy with IOC and drain placement; postoperativel bile leak treated with ERCP, placement of 7F x 7cm biliary stent    Postoperative bile leak     . Endo/Other:    (+) : arthritis: OA., .                 Abdominal:           Vascular:   + PE. Anesthesia Plan      general     ASA 3       Induction: intravenous. SIDDHARTH Marina - HOPE   6/18/2020         Pre Anesthesia Assessment complete.  Chart reviewed on 6/18/2020

## 2020-06-19 ENCOUNTER — HOSPITAL ENCOUNTER (OUTPATIENT)
Age: 75
Setting detail: OUTPATIENT SURGERY
Discharge: HOME | End: 2020-06-19
Attending: SURGERY | Admitting: SURGERY
Payer: MEDICARE

## 2020-06-19 ENCOUNTER — ANESTHESIA (OUTPATIENT)
Dept: ENDOSCOPY | Age: 75
End: 2020-06-19
Payer: MEDICARE

## 2020-06-19 ENCOUNTER — APPOINTMENT (OUTPATIENT)
Dept: GENERAL RADIOLOGY | Age: 75
End: 2020-06-19
Attending: SURGERY
Payer: MEDICARE

## 2020-06-19 VITALS
SYSTOLIC BLOOD PRESSURE: 139 MMHG | HEART RATE: 57 BPM | TEMPERATURE: 97 F | RESPIRATION RATE: 16 BRPM | OXYGEN SATURATION: 94 % | BODY MASS INDEX: 27.92 KG/M2 | WEIGHT: 195 LBS | DIASTOLIC BLOOD PRESSURE: 61 MMHG | HEIGHT: 70 IN

## 2020-06-19 VITALS
RESPIRATION RATE: 2 BRPM | OXYGEN SATURATION: 100 % | TEMPERATURE: 97.7 F | DIASTOLIC BLOOD PRESSURE: 67 MMHG | SYSTOLIC BLOOD PRESSURE: 146 MMHG

## 2020-06-19 LAB
INR BLD: 0.96 INDEX
PROTHROMBIN TIME: 11.6 SECONDS (ref 11.7–14.5)

## 2020-06-19 PROCEDURE — 2500000003 HC RX 250 WO HCPCS: Performed by: NURSE ANESTHETIST, CERTIFIED REGISTERED

## 2020-06-19 PROCEDURE — 6360000002 HC RX W HCPCS: Performed by: SURGERY

## 2020-06-19 PROCEDURE — 6370000000 HC RX 637 (ALT 250 FOR IP): Performed by: SURGERY

## 2020-06-19 PROCEDURE — 2709999900 HC NON-CHARGEABLE SUPPLY: Performed by: SURGERY

## 2020-06-19 PROCEDURE — 76000 FLUOROSCOPY <1 HR PHYS/QHP: CPT

## 2020-06-19 PROCEDURE — 74328 X-RAY BILE DUCT ENDOSCOPY: CPT | Performed by: SURGERY

## 2020-06-19 PROCEDURE — 7100000011 HC PHASE II RECOVERY - ADDTL 15 MIN: Performed by: SURGERY

## 2020-06-19 PROCEDURE — 2720000010 HC SURG SUPPLY STERILE: Performed by: SURGERY

## 2020-06-19 PROCEDURE — 7100000000 HC PACU RECOVERY - FIRST 15 MIN: Performed by: SURGERY

## 2020-06-19 PROCEDURE — 6360000002 HC RX W HCPCS: Performed by: NURSE ANESTHETIST, CERTIFIED REGISTERED

## 2020-06-19 PROCEDURE — 7100000010 HC PHASE II RECOVERY - FIRST 15 MIN: Performed by: SURGERY

## 2020-06-19 PROCEDURE — 3609015000 HC ERCP REMOVE FOREIGN BODY/STENT BILIARY/PANC DUCT: Performed by: SURGERY

## 2020-06-19 PROCEDURE — 85610 PROTHROMBIN TIME: CPT

## 2020-06-19 PROCEDURE — 43264 ERCP REMOVE DUCT CALCULI: CPT | Performed by: SURGERY

## 2020-06-19 PROCEDURE — C1769 GUIDE WIRE: HCPCS | Performed by: SURGERY

## 2020-06-19 PROCEDURE — 43275 ERCP REMOVE FORGN BODY DUCT: CPT | Performed by: SURGERY

## 2020-06-19 PROCEDURE — 3700000000 HC ANESTHESIA ATTENDED CARE: Performed by: SURGERY

## 2020-06-19 PROCEDURE — 3700000001 HC ADD 15 MINUTES (ANESTHESIA): Performed by: SURGERY

## 2020-06-19 PROCEDURE — 2580000003 HC RX 258: Performed by: SURGERY

## 2020-06-19 PROCEDURE — 6360000004 HC RX CONTRAST MEDICATION: Performed by: SURGERY

## 2020-06-19 PROCEDURE — 7100000001 HC PACU RECOVERY - ADDTL 15 MIN: Performed by: SURGERY

## 2020-06-19 RX ORDER — PROMETHAZINE HYDROCHLORIDE 25 MG/ML
6.25 INJECTION, SOLUTION INTRAMUSCULAR; INTRAVENOUS
Status: DISCONTINUED | OUTPATIENT
Start: 2020-06-19 | End: 2020-06-19 | Stop reason: HOSPADM

## 2020-06-19 RX ORDER — VECURONIUM BROMIDE 1 MG/ML
INJECTION, POWDER, LYOPHILIZED, FOR SOLUTION INTRAVENOUS PRN
Status: DISCONTINUED | OUTPATIENT
Start: 2020-06-19 | End: 2020-06-19 | Stop reason: SDUPTHER

## 2020-06-19 RX ORDER — SODIUM CHLORIDE, SODIUM LACTATE, POTASSIUM CHLORIDE, CALCIUM CHLORIDE 600; 310; 30; 20 MG/100ML; MG/100ML; MG/100ML; MG/100ML
INJECTION, SOLUTION INTRAVENOUS CONTINUOUS
Status: DISCONTINUED | OUTPATIENT
Start: 2020-06-19 | End: 2020-06-19 | Stop reason: HOSPADM

## 2020-06-19 RX ORDER — HYDRALAZINE HYDROCHLORIDE 20 MG/ML
5 INJECTION INTRAMUSCULAR; INTRAVENOUS EVERY 10 MIN PRN
Status: DISCONTINUED | OUTPATIENT
Start: 2020-06-19 | End: 2020-06-19 | Stop reason: HOSPADM

## 2020-06-19 RX ORDER — FENTANYL CITRATE 50 UG/ML
25 INJECTION, SOLUTION INTRAMUSCULAR; INTRAVENOUS EVERY 5 MIN PRN
Status: DISCONTINUED | OUTPATIENT
Start: 2020-06-19 | End: 2020-06-19 | Stop reason: HOSPADM

## 2020-06-19 RX ORDER — LABETALOL HYDROCHLORIDE 5 MG/ML
5 INJECTION, SOLUTION INTRAVENOUS EVERY 10 MIN PRN
Status: DISCONTINUED | OUTPATIENT
Start: 2020-06-19 | End: 2020-06-19 | Stop reason: HOSPADM

## 2020-06-19 RX ORDER — FENTANYL CITRATE 50 UG/ML
INJECTION, SOLUTION INTRAMUSCULAR; INTRAVENOUS PRN
Status: DISCONTINUED | OUTPATIENT
Start: 2020-06-19 | End: 2020-06-19 | Stop reason: SDUPTHER

## 2020-06-19 RX ORDER — LIDOCAINE HYDROCHLORIDE 20 MG/ML
INJECTION, SOLUTION INFILTRATION; PERINEURAL PRN
Status: DISCONTINUED | OUTPATIENT
Start: 2020-06-19 | End: 2020-06-19 | Stop reason: SDUPTHER

## 2020-06-19 RX ORDER — HYDROMORPHONE HCL 110MG/55ML
0.5 PATIENT CONTROLLED ANALGESIA SYRINGE INTRAVENOUS EVERY 5 MIN PRN
Status: DISCONTINUED | OUTPATIENT
Start: 2020-06-19 | End: 2020-06-19 | Stop reason: HOSPADM

## 2020-06-19 RX ORDER — CEFAZOLIN SODIUM 2 G/100ML
2 INJECTION, SOLUTION INTRAVENOUS
Status: COMPLETED | OUTPATIENT
Start: 2020-06-19 | End: 2020-06-19

## 2020-06-19 RX ORDER — SODIUM CHLORIDE 0.9 % (FLUSH) 0.9 %
10 SYRINGE (ML) INJECTION PRN
Status: DISCONTINUED | OUTPATIENT
Start: 2020-06-19 | End: 2020-06-19 | Stop reason: HOSPADM

## 2020-06-19 RX ORDER — SODIUM CHLORIDE 0.9 % (FLUSH) 0.9 %
10 SYRINGE (ML) INJECTION EVERY 12 HOURS SCHEDULED
Status: DISCONTINUED | OUTPATIENT
Start: 2020-06-19 | End: 2020-06-19 | Stop reason: HOSPADM

## 2020-06-19 RX ORDER — HYDROMORPHONE HCL 110MG/55ML
0.25 PATIENT CONTROLLED ANALGESIA SYRINGE INTRAVENOUS EVERY 5 MIN PRN
Status: DISCONTINUED | OUTPATIENT
Start: 2020-06-19 | End: 2020-06-19 | Stop reason: HOSPADM

## 2020-06-19 RX ORDER — DEXAMETHASONE SODIUM PHOSPHATE 4 MG/ML
INJECTION, SOLUTION INTRA-ARTICULAR; INTRALESIONAL; INTRAMUSCULAR; INTRAVENOUS; SOFT TISSUE PRN
Status: DISCONTINUED | OUTPATIENT
Start: 2020-06-19 | End: 2020-06-19 | Stop reason: SDUPTHER

## 2020-06-19 RX ORDER — PROPOFOL 10 MG/ML
INJECTION, EMULSION INTRAVENOUS PRN
Status: DISCONTINUED | OUTPATIENT
Start: 2020-06-19 | End: 2020-06-19 | Stop reason: SDUPTHER

## 2020-06-19 RX ORDER — ONDANSETRON 2 MG/ML
4 INJECTION INTRAMUSCULAR; INTRAVENOUS
Status: DISCONTINUED | OUTPATIENT
Start: 2020-06-19 | End: 2020-06-19 | Stop reason: HOSPADM

## 2020-06-19 RX ORDER — FENTANYL CITRATE 50 UG/ML
50 INJECTION, SOLUTION INTRAMUSCULAR; INTRAVENOUS EVERY 5 MIN PRN
Status: DISCONTINUED | OUTPATIENT
Start: 2020-06-19 | End: 2020-06-19 | Stop reason: HOSPADM

## 2020-06-19 RX ADMIN — DEXAMETHASONE SODIUM PHOSPHATE 4 MG: 4 INJECTION, SOLUTION INTRAMUSCULAR; INTRAVENOUS at 08:35

## 2020-06-19 RX ADMIN — VECURONIUM BROMIDE FOR INJECTION 2 MG: 1 INJECTION, POWDER, LYOPHILIZED, FOR SOLUTION INTRAVENOUS at 08:49

## 2020-06-19 RX ADMIN — PHENYLEPHRINE HYDROCHLORIDE 100 MCG: 10 INJECTION INTRAVENOUS at 08:37

## 2020-06-19 RX ADMIN — FENTANYL CITRATE 50 MCG: 50 INJECTION INTRAMUSCULAR; INTRAVENOUS at 08:17

## 2020-06-19 RX ADMIN — CEFAZOLIN SODIUM 2 G: 2 INJECTION, SOLUTION INTRAVENOUS at 08:29

## 2020-06-19 RX ADMIN — LIDOCAINE HYDROCHLORIDE 100 MG: 20 INJECTION, SOLUTION INFILTRATION; PERINEURAL at 08:19

## 2020-06-19 RX ADMIN — PROPOFOL 130 MG: 10 INJECTION, EMULSION INTRAVENOUS at 08:19

## 2020-06-19 RX ADMIN — SODIUM CHLORIDE, POTASSIUM CHLORIDE, SODIUM LACTATE AND CALCIUM CHLORIDE: 600; 310; 30; 20 INJECTION, SOLUTION INTRAVENOUS at 06:48

## 2020-06-19 RX ADMIN — INDOMETHACIN 100 MG: 50 SUPPOSITORY RECTAL at 09:35

## 2020-06-19 RX ADMIN — PHENYLEPHRINE HYDROCHLORIDE 150 MCG: 10 INJECTION INTRAVENOUS at 08:27

## 2020-06-19 RX ADMIN — SUGAMMADEX 200 MG: 100 INJECTION, SOLUTION INTRAVENOUS at 09:43

## 2020-06-19 ASSESSMENT — PULMONARY FUNCTION TESTS
PIF_VALUE: 2
PIF_VALUE: 27
PIF_VALUE: 1
PIF_VALUE: 26
PIF_VALUE: 25
PIF_VALUE: 26
PIF_VALUE: 23
PIF_VALUE: 26
PIF_VALUE: 27
PIF_VALUE: 26
PIF_VALUE: 27
PIF_VALUE: 18
PIF_VALUE: 23
PIF_VALUE: 27
PIF_VALUE: 26
PIF_VALUE: 24
PIF_VALUE: -14
PIF_VALUE: 20
PIF_VALUE: 26
PIF_VALUE: 23
PIF_VALUE: 18
PIF_VALUE: 24
PIF_VALUE: 23
PIF_VALUE: 1
PIF_VALUE: 27
PIF_VALUE: 23
PIF_VALUE: 1
PIF_VALUE: 22
PIF_VALUE: 27
PIF_VALUE: 24
PIF_VALUE: 18
PIF_VALUE: 18
PIF_VALUE: 25
PIF_VALUE: 18
PIF_VALUE: 23
PIF_VALUE: 23
PIF_VALUE: 26
PIF_VALUE: 2
PIF_VALUE: 19
PIF_VALUE: 24
PIF_VALUE: 25
PIF_VALUE: 21
PIF_VALUE: 21
PIF_VALUE: 27
PIF_VALUE: 27
PIF_VALUE: -14
PIF_VALUE: 17
PIF_VALUE: -14
PIF_VALUE: 21
PIF_VALUE: 23
PIF_VALUE: 18
PIF_VALUE: 26
PIF_VALUE: 27
PIF_VALUE: 26
PIF_VALUE: 21
PIF_VALUE: -15
PIF_VALUE: 26
PIF_VALUE: 21
PIF_VALUE: 23
PIF_VALUE: 26
PIF_VALUE: 26
PIF_VALUE: 25
PIF_VALUE: 23
PIF_VALUE: 27
PIF_VALUE: 17
PIF_VALUE: 2
PIF_VALUE: 17
PIF_VALUE: 22
PIF_VALUE: 22
PIF_VALUE: 27
PIF_VALUE: 17
PIF_VALUE: 23
PIF_VALUE: 22
PIF_VALUE: 18
PIF_VALUE: 21
PIF_VALUE: 18
PIF_VALUE: 24
PIF_VALUE: 26
PIF_VALUE: 26
PIF_VALUE: 23
PIF_VALUE: 0
PIF_VALUE: 18
PIF_VALUE: 17
PIF_VALUE: 18
PIF_VALUE: 27
PIF_VALUE: 22
PIF_VALUE: 27
PIF_VALUE: 17
PIF_VALUE: 22
PIF_VALUE: 27
PIF_VALUE: 21
PIF_VALUE: 26
PIF_VALUE: 26
PIF_VALUE: 23
PIF_VALUE: 27
PIF_VALUE: 23
PIF_VALUE: 23

## 2020-06-19 ASSESSMENT — PAIN SCALES - GENERAL
PAINLEVEL_OUTOF10: 0

## 2020-06-19 ASSESSMENT — PAIN - FUNCTIONAL ASSESSMENT: PAIN_FUNCTIONAL_ASSESSMENT: 0-10

## 2020-06-19 NOTE — PROGRESS NOTES
Returned to room from PACU. Awake, alert. Chest clear, abdomen soft. Offers no complaints. Coffee given po.

## 2020-06-19 NOTE — ANESTHESIA POSTPROCEDURE EVALUATION
Department of Anesthesiology  Postprocedure Note    Patient: Alonso Saldana  MRN: [id number]  YOB: 1945  Date of evaluation: 6/19/2020  Time:  9:57 AM     Procedure Summary     Date:  06/19/20 Room / Location:  [address]    Anesthesia Start:  0340 Anesthesia Stop:  4136    Procedure:  ERCP FOREIGN BODY REMOVAL (STENT REMOVAL VS EXCHANGE) (N/A ) Diagnosis:       Postoperative bile leak      (postoperative bile leak)    Surgeon:  Camryn Dupree MD Responsible Provider:  SIDDHARTH Dick CRNA    Anesthesia Type:  general ASA Status:  3          Anesthesia Type: general    Kenna Phase I:      Kenna Phase II:      Last vitals: Reviewed and per EMR flowsheets.        Anesthesia Post Evaluation    Patient location during evaluation: PACU  Patient participation: complete - patient participated  Level of consciousness: awake and alert  Pain score: 0  Airway patency: patent  Nausea & Vomiting: no vomiting and no nausea  Complications: no  Cardiovascular status: blood pressure returned to baseline and hemodynamically stable  Respiratory status: acceptable, spontaneous ventilation, nonlabored ventilation and face mask  Hydration status: stable

## 2020-06-19 NOTE — BRIEF OP NOTE
GENERAL SURGERY BRIEF OPERATIVE NOTE  [facility]    PATIENT: Jojo Chanel 1945   MRN: [id number]    DATE: 6/19/2020    SURGEON: Mildred Brewster MD    CASE ID: [id number]     PROCEDURE(S) PERFORMED:      ERCP FOREIGN BODY REMOVAL (STENT REMOVAL VS EXCHANGE): 27441 (CPT®), SPHINCTEROTOMY, AND STONE EXTRACTION    PREOPERATIVE DIAGNOSIS:  postoperative bile leak, history of choledocholithiasis    POSTOPERATIVE DIAGNOSIS:   Same, choledocholithiasis and sludge    INDICATIONS: Jojo Chanel is a 67 y.o. male presenting with history of choledocholithiasis for which he underwent attempted ERCP with inability to cannulate, laparoscopic cholecystectomy with IOC, and subsequent ERCP with stent placement for a postoperative bile leak with possible persistent obstruction. He is due for stent removal versus exchange. The risks, benefits, potential complications and possible alternatives of the procedure were discussed in detail, including complications of but not limited to infection, bleeding, anesthesia-related complications, death, injury to surrounding structures, pain, pancreatitis, residual bile leak, need for further stents or drains, or need for additional interventions. All questions were answered. The patient wished to proceed and consent was documented in the medical record. FINDINGS:   Uncomplicated stent removal, no residual bile leak. Residual choledocholithiasis and sludge was removed. ANESTHESIA:   General endotracheal anesthesia  Anesthesiologist: Jerica Rowan MD  CRNA: SIDDHARTH Dove - CRNA    STAFF:   Scrub Person First: Prabhu Diamond  Scrub Person Second: Anoop Garner RN    ESTIMATED BLOOD LOSS: Minimal    SPECIMEN(S):   * No specimens in log *    DRAINS & IMPLANTS:  * No implants in log *     COMPLICATIONS: none    DISPOSITION: PACU - hemodynamically stable.      CONDITION: stable     Electronically signed:   Mildred Brewster MD 6/19/2020 9:43 AM

## 2020-06-19 NOTE — ANESTHESIA PRE PROCEDURE
K80.51    Cholelithiasis with obstruction K80.21    Postoperative bile leak K91.89, K83.8       Past Medical History:        Diagnosis Date    Allergic rhinitis     Anxiety     BPH (benign prostatic hyperplasia)     CAD (coronary artery disease)     Dermatitis     GERD (gastroesophageal reflux disease)     Hyperlipidemia     Hypertension     Idiopathic peripheral autonomic neuropathy     Insomnia     Meniere's disease     Osteoarthritis     Polyneuropathy     Pulmonary embolism (HCC)     Urinary retention     Vitamin B12 deficiency anemia        Past Surgical History:        Procedure Laterality Date    ABOVE KNEE AMPUTATION Right     APPENDECTOMY      CHOLECYSTECTOMY, LAPAROSCOPIC N/A 5/1/2020    CHOLECYSTECTOMY LAPAROSCOPIC WITH IOC performed by Soo Sabillon MD at [facility] OR    ERCP N/A 4/30/2020    ERCP DIAGNOSTIC performed by Soo Sabillon MD at [facility] ENDOSCOPY    ERCP N/A 5/3/2020    ERCP STENT INSERTION of 7mm biliary stent, pd not injected performed by Bonnie Mishra MD at [place] [address]         Social History:    Social History     Tobacco Use    Smoking status: Former Smoker    Smokeless tobacco: Never Used   Substance Use Topics    Alcohol use: Never     Frequency: Never                                Counseling given: Not Answered      Vital Signs (Current): There were no vitals filed for this visit.                                            BP Readings from Last 3 Encounters:   06/19/20 (!) 151/80   05/26/20 138/62   05/06/20 (!) 152/71       NPO Status:                                                                                 BMI:   Wt Readings from Last 3 Encounters:   06/19/20 195 lb (88.5 kg)   05/26/20 195 lb (88.5 kg)   05/06/20 196 lb (88.9 kg)     There is no height or weight on file to calculate BMI.    CBC:   Lab Results   Component Value Date    WBC 3.6 06/17/2020    RBC 2.16 06/17/2020    HGB 12.9 06/17/2020    HCT 25.9 06/17/2020

## 2020-06-20 NOTE — OP NOTE
obtained and consent was documented in the medical record. The patient was brought to the endoscopy suite and positioned supine on the procedure table. Anesthesia was initiated and a time out was performed in which all were in agreement. Appropriate perioperative antibiotics were administered and the patient was transitioned to prone positioning with a bump under the right side, taking care to pad pressure points. The radiographic  film showed a stent in place. Without a detailed examination of the pharynx, larynx, associated structures or upper GI tract due to the side-viewing optics of the duodenoscope, the esophagus was successfully intubated with the duodenoscope which passed easily into the stomach and trough the pylorus into the duodenum. The upper GI tract was grossly normal. The duodenoscope was advanced to the major papilla in the descending duodenum which appeared to have a stent in place. The stent was removed with a snare, and appeared intact. Cannulation: A wire first approach was used, passing a TrueTome 30mm 4.4F sphincterotome and a straight tip 0.035in Dreamwire into the biliary tree. Selective cannulation was achieved. Glucagon was not required to aid in cannulation. The bile duct was then deeply cannulated, and a cholangiogram performed. Cholangiogram, Radiographic Findings: Contrast was injected. I personally interpreted the radiographic images during the procedure. Ductal flow of contrast was adequate. Image quality was adequate. Contrast extended to the hepatic duct bifurcation and into the biliary tree. The distal common bile duct appeared to contain sludge. The cystic duct appeared consistent with cholecystectomy, and had no evidence of extravasation. The remaining visualized portions of the biliary tree were grossly normal.     Sphincterotomy: A 9mm biliary sphincterotomy was made with the sphincterotome using traction in the standard fashion, extending not quite to the level of the transverse duodenal fold. A feedlback regulated (ERBE). electrocautery unit was used. The sphincterotomy was hemostatic. Balloon Sweep: The biliary tree was swept with a 9/12mm balloon catheter starting at the hepatic duct bifurcation. sludge and debris was found, as well as one approximately 5mm stone. The common bile duct was swept until free of filling defects or debris and the balloon catheter removed. At the end of the procedure, the  visualized portions of the biliary system were clear of of obstruction or filling defects. All catheters and guidewires were removed. The endoscope was withdrawn from the patient and the insufflated CO2 gas was suctioned out. No biopsy or cytology specimen was collected for this exam. Rectal indomethacin was given for the prevention of post-ERCP pancreatitis. The patient tolerated the procedure well with no apparent complications and was awakened from anesthesia and transferred to PACU - hemodynamically stable. I was present and primarily performed all critical portions of the case.         Electronically signed:   Eufemia Pepper MD 6/19/2020 9:43 AM

## 2020-06-22 LAB
ALBUMIN SERPL-MCNC: 3.6 G/DL
ALP BLD-CCNC: 72 U/L
ALT SERPL-CCNC: 20 U/L
ANION GAP SERPL CALCULATED.3IONS-SCNC: ABNORMAL MMOL/L
AST SERPL-CCNC: 15 U/L
BASOPHILS ABSOLUTE: ABNORMAL
BASOPHILS RELATIVE PERCENT: 1.3 %
BILIRUB SERPL-MCNC: 1 MG/DL (ref 0.1–1.4)
BUN BLDV-MCNC: 9 MG/DL
CALCIUM SERPL-MCNC: 9 MG/DL
CHLORIDE BLD-SCNC: 105 MMOL/L
CO2: 29 MMOL/L
CREAT SERPL-MCNC: 0.6 MG/DL
EOSINOPHILS ABSOLUTE: 0.3 /ΜL
EOSINOPHILS RELATIVE PERCENT: 6.9 %
GFR CALCULATED: 131
GLUCOSE BLD-MCNC: 103 MG/DL
HCT VFR BLD CALC: 33.4 % (ref 41–53)
HEMOGLOBIN: 10.8 G/DL (ref 13.5–17.5)
LYMPHOCYTES ABSOLUTE: 1.7 /ΜL
LYMPHOCYTES RELATIVE PERCENT: 44.1 %
MCH RBC QN AUTO: 31.5 PG
MCHC RBC AUTO-ENTMCNC: 32.2 G/DL
MCV RBC AUTO: 97.7 FL
MONOCYTES ABSOLUTE: 0.3 /ΜL
MONOCYTES RELATIVE PERCENT: 7.5 %
NEUTROPHILS ABSOLUTE: 1.5 /ΜL
NEUTROPHILS RELATIVE PERCENT: 40.2 %
PLATELET # BLD: 199 K/ΜL
PMV BLD AUTO: 8.3 FL
POTASSIUM SERPL-SCNC: 4.3 MMOL/L
RBC # BLD: 3.42 10^6/ΜL
SODIUM BLD-SCNC: 142 MMOL/L
TOTAL PROTEIN: 6.5
WBC # BLD: 3.8 10^3/ML

## 2020-06-23 ENCOUNTER — OFFICE VISIT (OUTPATIENT)
Dept: SURGERY | Age: 75
End: 2020-06-23

## 2020-06-23 VITALS
RESPIRATION RATE: 14 BRPM | HEIGHT: 70 IN | HEART RATE: 77 BPM | OXYGEN SATURATION: 97 % | DIASTOLIC BLOOD PRESSURE: 70 MMHG | WEIGHT: 195 LBS | SYSTOLIC BLOOD PRESSURE: 132 MMHG | TEMPERATURE: 98.5 F | BODY MASS INDEX: 27.92 KG/M2

## 2020-06-23 PROCEDURE — 99024 POSTOP FOLLOW-UP VISIT: CPT | Performed by: SURGERY

## 2020-06-23 NOTE — PROGRESS NOTES
Teressa Causey MD at [facility]         Current Medications:   Current Outpatient Medications   Medication Sig Dispense Refill    acetaminophen (TYLENOL) 325 MG tablet Take 650 mg by mouth every 4 hours as needed for Pain 2 tablets      sodium chloride nebulizer 0.9 % solution 3 mLs daily      tuberculin 5 UNIT/0.1ML injection Inject 5 Units into the skin once Every 12 months      warfarin (COUMADIN) 2.5 MG tablet Take 3 mg by mouth daily Give 3 tablet orally one time a day      amitriptyline (ELAVIL) 25 MG tablet Take 25 mg by mouth nightly      amLODIPine (NORVASC) 5 MG tablet Take 5 mg by mouth daily      atorvastatin (LIPITOR) 10 MG tablet Take 10 mg by mouth daily      bisacodyl (DULCOLAX) 5 MG EC tablet Take 10 mg by mouth daily      celecoxib (CELEBREX) 200 MG capsule Take 200 mg by mouth daily      ferrous sulfate 325 (65 Fe) MG EC tablet Take 325 mg by mouth daily (with breakfast)      finasteride (PROSCAR) 5 MG tablet Take 5 mg by mouth daily      loratadine (CLARITIN) 10 MG capsule Take 10 mg by mouth daily      omeprazole (PRILOSEC) 20 MG delayed release capsule Take 20 mg by mouth Daily      tamsulosin (FLOMAX) 0.4 MG capsule Take 0.4 mg by mouth daily      Cholecalciferol (VITAMIN D3) 125 MCG (5000 UT) TABS Take 5,000 Units by mouth daily      meclizine (ANTIVERT) 12.5 MG tablet Take 12.5 mg by mouth 2 times daily      guaiFENesin (MUCINEX) 600 MG extended release tablet Take 600 mg by mouth 2 times daily       gabapentin (NEURONTIN) 400 MG capsule Take 800 mg by mouth 3 times daily.  magnesium hydroxide (MILK OF MAGNESIA) 400 MG/5ML suspension Take 30 mLs by mouth daily as needed for Constipation      polyethylene glycol (GLYCOLAX) packet Take 17 g by mouth daily as needed for Constipation      terbinafine (LAMISIL) 1 % cream Apply topically 2 times daily Apply topically 2 times daily. No current facility-administered medications for this visit. Allergies:  Sulfa antibiotics and Tramadol    Social History:   Social History     Socioeconomic History    Marital status:      Spouse name: None    Number of children: None    Years of education: None    Highest education level: None   Occupational History    None   Social Needs    Financial resource strain: None    Food insecurity     Worry: None     Inability: None    Transportation needs     Medical: None     Non-medical: None   Tobacco Use    Smoking status: Former Smoker    Smokeless tobacco: Never Used   Substance and Sexual Activity    Alcohol use: Never     Frequency: Never    Drug use: Never    Sexual activity: None   Lifestyle    Physical activity     Days per week: None     Minutes per session: None    Stress: None   Relationships    Social connections     Talks on phone: None     Gets together: None     Attends Mu-ism service: None     Active member of club or organization: None     Attends meetings of clubs or organizations: None     Relationship status: None    Intimate partner violence     Fear of current or ex partner: None     Emotionally abused: None     Physically abused: None     Forced sexual activity: None   Other Topics Concern    None   Social History Narrative    None       Family History:   No family history on file. REVIEW OF SYSTEMS:    Review of Systems   Constitutional: Negative for chills and fever. HENT: Negative for congestion and sore throat. Eyes: Negative for discharge and redness. Respiratory: Negative for chest tightness and shortness of breath. Cardiovascular: Negative for chest pain and palpitations. Gastrointestinal: Negative for abdominal distention, abdominal pain, constipation, diarrhea, nausea and vomiting. Genitourinary: Negative for dysuria and flank pain. Musculoskeletal: Positive for arthralgias (arthritis) and back pain (chronic). Negative for myalgias. Skin: Negative for color change and rash.    Neurological: Positive for dizziness. Negative for numbness. Psychiatric/Behavioral: Negative for confusion. The patient is not nervous/anxious. I have reviewed the patient's information pertinent to this visit, including medical history, family history, social history and review of systems. PHYSICAL EXAM:    Vitals:    06/23/20 1254   BP: 132/70   Site: Right Upper Arm   Position: Sitting   Cuff Size: Medium Adult   Pulse: 77   Resp: 14   Temp: 98.5 °F (36.9 °C)   TempSrc: Infrared   SpO2: 97%   Weight: 195 lb (88.5 kg)   Height: 5' 10\" (1.778 m)       Physical Exam  Constitutional:       General: He is not in acute distress. Appearance: He is well-developed. He is not diaphoretic. HENT:      Head: Normocephalic and atraumatic. Eyes:      General:         Right eye: No discharge. Left eye: No discharge. Pupils: Pupils are equal, round, and reactive to light. Neck:      Musculoskeletal: Neck supple. Trachea: No tracheal deviation. Cardiovascular:      Rate and Rhythm: Normal rate and regular rhythm. Pulmonary:      Effort: Pulmonary effort is normal. No respiratory distress. Breath sounds: No wheezing. Abdominal:      General: There is no distension. Palpations: Abdomen is soft. Tenderness: There is no abdominal tenderness. There is no guarding or rebound. Comments: Well healed laparoscopic incisions. Musculoskeletal:         General: No tenderness or deformity. 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 Findings: No rash. Neurological:      Mental Status: He is alert and oriented to person, place, and time.    Psychiatric:         Behavior: Behavior normal.         DATA:    Pathology Results:   Labs:  Lab Results   Component Value Date    WBC 3.6 (L) 06/17/2020    HGB 12.9 (L) 06/17/2020    HCT 25.9 (L) 06/17/2020     06/17/2020     06/17/2020    K 4.4 06/17/2020     06/17/2020    CO2 25 06/17/2020    BUN 11 06/17/2020    CREATININE 0.7 (L) 06/17/2020    GLUCOSE 154 (H) 06/17/2020    CALCIUM 9.2 06/17/2020    PROT 6.9 06/17/2020    BILITOT 1.4 (H) 06/17/2020    AST 17 06/17/2020    ALT 17 06/17/2020    ALKPHOS 77 06/17/2020    AMYLASE 5 (L) 05/03/2020    LIPASE 13 05/03/2020    INR 0.96 06/19/2020       Imaging:   Pertinent laboratory and imaging studies were personally reviewed if available. IMPRESSION:    Kala Rocha is a 67 y.o. male following-up postoperatively from ERCP with stent removal, sphincterotomy, and stone extraction. Status post laparoscopic cholecystectomy, and ERCP with stent placement for postoperative bile leak, now resolved. Visit Diagnoses:  1. Postoperative bile leak    2. Postop check        Patient Active Problem List    Diagnosis Date Noted    Postoperative bile leak 05/27/2020    Choledocholithiasis with obstruction 04/27/2020    Cholelithiasis with obstruction 04/27/2020    Elevated liver enzymes 02/02/2020    Jaundice 01/28/2020       PLAN:   Continue current care   Increase activity as tolerated   Resume Coumadin as instructed   CBC and CMP collected through the [facility] show normalization of bilirubin, and improving LFTs.  His bile leak has resolved, and there was no extravasation of contrast on his ERCP. Some stones and sludge were removed at the time of the stent removal, and a sphincterotomy was performed. This appears to have cleared his common bile duct of obstruction, and facilitated normalization of his LFTs. No further intervention needed at this time. Discussed signs and symptoms to watch out for in the rare event of a primary bile duct stone causing obstruction.  Follow Up: Return if symptoms worsen or fail to improve. No orders of the defined types were placed in this encounter. No orders of the defined types were placed in this encounter.       Electronically signed by My Bruno MD, 6/25/2020, 11:15 PM.

## 2020-06-25 ASSESSMENT — ENCOUNTER SYMPTOMS
EYE REDNESS: 0
SORE THROAT: 0
VOMITING: 0
CONSTIPATION: 0
ABDOMINAL PAIN: 0
DIARRHEA: 0
ABDOMINAL DISTENTION: 0
COLOR CHANGE: 0
SHORTNESS OF BREATH: 0
EYE DISCHARGE: 0
CHEST TIGHTNESS: 0
NAUSEA: 0
BACK PAIN: 1

## 2021-02-03 ENCOUNTER — APPOINTMENT (OUTPATIENT)
Dept: CT IMAGING | Age: 76
End: 2021-02-03
Payer: MEDICARE

## 2021-02-03 ENCOUNTER — HOSPITAL ENCOUNTER (EMERGENCY)
Age: 76
Discharge: HOME | End: 2021-02-04
Attending: EMERGENCY MEDICINE
Payer: MEDICARE

## 2021-02-03 DIAGNOSIS — S01.01XA LACERATION OF SCALP, INITIAL ENCOUNTER: ICD-10-CM

## 2021-02-03 DIAGNOSIS — S09.90XA CLOSED HEAD INJURY, INITIAL ENCOUNTER: Primary | ICD-10-CM

## 2021-02-03 PROCEDURE — 99285 EMERGENCY DEPT VISIT HI MDM: CPT

## 2021-02-03 PROCEDURE — 72131 CT LUMBAR SPINE W/O DYE: CPT

## 2021-02-03 PROCEDURE — 12002 RPR S/N/AX/GEN/TRNK2.6-7.5CM: CPT

## 2021-02-03 PROCEDURE — 70450 CT HEAD/BRAIN W/O DYE: CPT

## 2021-02-03 PROCEDURE — 72125 CT NECK SPINE W/O DYE: CPT

## 2021-02-03 ASSESSMENT — PAIN DESCRIPTION - PAIN TYPE: TYPE: ACUTE PAIN

## 2021-02-03 ASSESSMENT — PAIN DESCRIPTION - LOCATION: LOCATION: HEAD

## 2021-02-04 VITALS
DIASTOLIC BLOOD PRESSURE: 82 MMHG | HEART RATE: 74 BPM | OXYGEN SATURATION: 95 % | RESPIRATION RATE: 18 BRPM | SYSTOLIC BLOOD PRESSURE: 128 MMHG | TEMPERATURE: 98.7 F | BODY MASS INDEX: 27.98 KG/M2 | WEIGHT: 195 LBS

## 2021-02-04 LAB
ANION GAP SERPL CALCULATED.3IONS-SCNC: 11 MMOL/L (ref 4–16)
BASOPHILS ABSOLUTE: 0 K/CU MM
BASOPHILS RELATIVE PERCENT: 0.3 % (ref 0–1)
BUN BLDV-MCNC: 15 MG/DL (ref 6–23)
CALCIUM SERPL-MCNC: 8.8 MG/DL (ref 8.3–10.6)
CHLORIDE BLD-SCNC: 101 MMOL/L (ref 99–110)
CO2: 24 MMOL/L (ref 21–32)
CREAT SERPL-MCNC: 0.6 MG/DL (ref 0.9–1.3)
DIFFERENTIAL TYPE: ABNORMAL
EOSINOPHILS ABSOLUTE: 0.1 K/CU MM
EOSINOPHILS RELATIVE PERCENT: 1.2 % (ref 0–3)
GFR AFRICAN AMERICAN: >60 ML/MIN/1.73M2
GFR NON-AFRICAN AMERICAN: >60 ML/MIN/1.73M2
GLUCOSE BLD-MCNC: 176 MG/DL (ref 70–99)
HCT VFR BLD CALC: 36 % (ref 42–52)
HEMOGLOBIN: 13.3 GM/DL (ref 13.5–18)
IMMATURE NEUTROPHIL %: 1.4 % (ref 0–0.43)
INR BLD: 2.55 INDEX
LYMPHOCYTES ABSOLUTE: 1.7 K/CU MM
LYMPHOCYTES RELATIVE PERCENT: 22.3 % (ref 24–44)
MCH RBC QN AUTO: 38.9 PG (ref 27–31)
MCHC RBC AUTO-ENTMCNC: 36.9 % (ref 32–36)
MCV RBC AUTO: 105.3 FL (ref 78–100)
MONOCYTES ABSOLUTE: 0.4 K/CU MM
MONOCYTES RELATIVE PERCENT: 5.6 % (ref 0–4)
NUCLEATED RBC %: 0 %
PDW BLD-RTO: 15.8 % (ref 11.7–14.9)
PLATELET # BLD: 212 K/CU MM (ref 140–440)
PMV BLD AUTO: 10.5 FL (ref 7.5–11.1)
POTASSIUM SERPL-SCNC: 3.9 MMOL/L (ref 3.5–5.1)
PROTHROMBIN TIME: 31.2 SECONDS (ref 11.7–14.5)
RBC # BLD: 3.42 M/CU MM (ref 4.6–6.2)
SEGMENTED NEUTROPHILS ABSOLUTE COUNT: 5.3 K/CU MM
SEGMENTED NEUTROPHILS RELATIVE PERCENT: 69.2 % (ref 36–66)
SODIUM BLD-SCNC: 136 MMOL/L (ref 135–145)
TOTAL IMMATURE NEUTOROPHIL: 0.11 K/CU MM
TOTAL NUCLEATED RBC: 0 K/CU MM
WBC # BLD: 7.7 K/CU MM (ref 4–10.5)

## 2021-02-04 PROCEDURE — 85610 PROTHROMBIN TIME: CPT

## 2021-02-04 PROCEDURE — 36415 COLL VENOUS BLD VENIPUNCTURE: CPT

## 2021-02-04 PROCEDURE — 85025 COMPLETE CBC W/AUTO DIFF WBC: CPT

## 2021-02-04 PROCEDURE — 80048 BASIC METABOLIC PNL TOTAL CA: CPT

## 2021-02-04 PROCEDURE — 90715 TDAP VACCINE 7 YRS/> IM: CPT | Performed by: EMERGENCY MEDICINE

## 2021-02-04 PROCEDURE — 6370000000 HC RX 637 (ALT 250 FOR IP): Performed by: EMERGENCY MEDICINE

## 2021-02-04 PROCEDURE — 90471 IMMUNIZATION ADMIN: CPT | Performed by: EMERGENCY MEDICINE

## 2021-02-04 PROCEDURE — 6360000002 HC RX W HCPCS: Performed by: EMERGENCY MEDICINE

## 2021-02-04 RX ORDER — HYDROCODONE BITARTRATE AND ACETAMINOPHEN 5; 325 MG/1; MG/1
1 TABLET ORAL ONCE
Status: COMPLETED | OUTPATIENT
Start: 2021-02-04 | End: 2021-02-04

## 2021-02-04 RX ADMIN — TETANUS TOXOID, REDUCED DIPHTHERIA TOXOID AND ACELLULAR PERTUSSIS VACCINE, ADSORBED 0.5 ML: 5; 2.5; 8; 8; 2.5 SUSPENSION INTRAMUSCULAR at 01:10

## 2021-02-04 RX ADMIN — HYDROCODONE BITARTRATE AND ACETAMINOPHEN 1 TABLET: 5; 325 TABLET ORAL at 01:02

## 2021-02-04 ASSESSMENT — PAIN SCALES - GENERAL: PAINLEVEL_OUTOF10: 7

## 2021-02-04 NOTE — ED NOTES
This nurse called and updated Abdoul George supervisor of patient returning to their facility and POC. Nursing supervisor voices understanding.      Renee Best RN  02/04/21 1522

## 2021-02-04 NOTE — ED NOTES
Bed: ED-17  Expected date: 2/3/21  Expected time:   Means of arrival:   Comments:  Dayan Ballard  02/03/21 4430

## 2021-02-04 NOTE — ED NOTES
Report called to Fransisca Garcia, nursing supervisor. All questions answered.       Sneha Vicente, RN  02/04/21 1698

## 2021-02-04 NOTE — ED PROVIDER NOTES
Triage Chief Complaint:   Fall    [place]:  Marco A Mcknight is a 67 y.o. male that presents via EMS after a fall. Patient is from [facility]. States that he got his feet tangled up while he was moving back to his recliner and he fell backwards striking his head on a piece of furniture. Denies loss of consciousness. States that he has some lower back soreness. Denies any headache, neck pain, chest or abdominal pain, vision changes, motor or sensory deficits. The patient is on Coumadin and EMS reports a laceration to the back of his scalp. Vital signs have been normal.    ROS:  At least 10 systems reviewed and otherwise acutely negative except as in the [address] Past Medical History:   Diagnosis Date    Allergic rhinitis     Anxiety     BPH (benign prostatic hyperplasia)     CAD (coronary artery disease)     Dermatitis     GERD (gastroesophageal reflux disease)     Hyperlipidemia     Hypertension     Idiopathic peripheral autonomic neuropathy     Insomnia     Meniere's disease     Osteoarthritis     Polyneuropathy     Pulmonary embolism (HCC)     Urinary retention     Vitamin B12 deficiency anemia      Past Surgical History:   Procedure Laterality Date    ABOVE KNEE AMPUTATION Right     APPENDECTOMY      CHOLECYSTECTOMY, LAPAROSCOPIC N/A 5/1/2020    CHOLECYSTECTOMY LAPAROSCOPIC WITH IOC performed by Yessenia Marshall MD at [facility] ERCP N/A 4/30/2020    ERCP DIAGNOSTIC performed by Yessenia Marshall MD at [place] [address] ERCP N/A 5/3/2020    ERCP STENT INSERTION of 7mm biliary stent, pd not injected performed by Yanely Alcantara MD at [facility] ERCP N/A 6/19/2020    ERCP FOREIGN BODY REMOVAL/STENT REMOVAL/ SPHINCTEROTOMY AND BALLOON SWEEP AND REMOVAL OF MULTIPLE STONES AND SLUDGE performed by Yessenia Marshall MD at [address] [place] [address]       History reviewed. No pertinent family history. Social History     Socioeconomic History    Marital status:   Spouse name: Not on file    Number of children: Not on file    Years of education: Not on file    Highest education level: Not on file   Occupational History    Not on file   Social Needs    Financial resource strain: Not on file    Food insecurity     Worry: Not on file     Inability: Not on file    Transportation needs     Medical: Not on file     Non-medical: Not on file   Tobacco Use    Smoking status: Former Smoker    Smokeless tobacco: Never Used   Substance and Sexual Activity    Alcohol use: Never     Frequency: Never    Drug use: Never    Sexual activity: Not on file   Lifestyle    Physical activity     Days per week: Not on file     Minutes per session: Not on file    Stress: Not on file   Relationships    Social connections     Talks on phone: Not on file     Gets together: Not on file     Attends Anabaptist service: Not on file     Active member of club or organization: Not on file     Attends meetings of clubs or organizations: Not on file     Relationship status: Not on file    Intimate partner violence     Fear of current or ex partner: Not on file     Emotionally abused: Not on file     Physically abused: Not on file     Forced sexual activity: Not on file   Other Topics Concern    Not on file   Social History Narrative    Not on file     No current facility-administered medications for this encounter.       Current Outpatient Medications   Medication Sig Dispense Refill    acetaminophen (TYLENOL) 325 MG tablet Take 650 mg by mouth every 4 hours as needed for Pain 2 tablets      sodium chloride nebulizer 0.9 % solution 3 mLs daily      tuberculin 5 UNIT/0.1ML injection Inject 5 Units into the skin once Every 12 months      warfarin (COUMADIN) 2.5 MG tablet Take 3 mg by mouth daily Give 3 tablet orally one time a day      amitriptyline (ELAVIL) 25 MG tablet Take 25 mg by mouth nightly      amLODIPine (NORVASC) 5 MG tablet Take 5 mg by mouth daily      atorvastatin (LIPITOR) 10 MG tablet Take 10 mg by mouth daily      bisacodyl (DULCOLAX) 5 MG EC tablet Take 10 mg by mouth daily      celecoxib (CELEBREX) 200 MG capsule Take 200 mg by mouth daily      ferrous sulfate 325 (65 Fe) MG EC tablet Take 325 mg by mouth daily (with breakfast)      finasteride (PROSCAR) 5 MG tablet Take 5 mg by mouth daily      loratadine (CLARITIN) 10 MG capsule Take 10 mg by mouth daily      omeprazole (PRILOSEC) 20 MG delayed release capsule Take 20 mg by mouth Daily      tamsulosin (FLOMAX) 0.4 MG capsule Take 0.4 mg by mouth daily      Cholecalciferol (VITAMIN D3) 125 MCG (5000 UT) TABS Take 5,000 Units by mouth daily      meclizine (ANTIVERT) 12.5 MG tablet Take 12.5 mg by mouth 2 times daily      guaiFENesin (MUCINEX) 600 MG extended release tablet Take 600 mg by mouth 2 times daily       gabapentin (NEURONTIN) 400 MG capsule Take 800 mg by mouth 3 times daily.  magnesium hydroxide (MILK OF MAGNESIA) 400 MG/5ML suspension Take 30 mLs by mouth daily as needed for Constipation      polyethylene glycol (GLYCOLAX) packet Take 17 g by mouth daily as needed for Constipation      terbinafine (LAMISIL) 1 % cream Apply topically 2 times daily Apply topically 2 times daily. Allergies   Allergen Reactions    Sulfa Antibiotics Other (See Comments)     unknown    Tramadol Other (See Comments)     Shaking       Nursing Notes Reviewed    Physical Exam:  ED Triage Vitals   Enc Vitals Group      BP       Pulse       Resp       Temp       Temp src       SpO2       Weight       Height       Head Circumference       Peak Flow       Pain Score       Pain Loc       Pain Edu? Excl. in [address]? General appearance:  No acute distress. Head: Normocephalic, 6cm linear laceration to the posterior scalp  Face: No bony deformity, midface stable  Skin:  Warm. Dry. No acute wounds  Eye:  Extraocular movements intact.   Pupils equal and reactive  Ears, nose, mouth and throat:  Oral mucosa moist  Neck: Trachea midline. Extremity:  No swelling. Normal ROM. No tenderness to palpation x4 extremities   Back: No midline CTLS tenderness to palpation or step-offs  Heart:  Regular rate and rhythm  Respiratory:  Lungs clear to auscultation bilaterally. Respirations nonlabored. Chest: No tenderness to palpation. No ecchymosis or deformity. Abdominal:  Soft. Nontender. Non distended.      Neurological:  Alert and oriented times 4.  5 out of 5 motor strength and sensation intact to light touch in all extremities    I have reviewed and interpreted all of the currently available lab results from this visit (if applicable):  Results for orders placed or performed during the hospital encounter of 02/03/21   PT - INR   Result Value Ref Range    Protime 31.2 (H) 11.7 - 14.5 SECONDS    INR 2.55 INDEX   CBC Auto Differential   Result Value Ref Range    WBC 7.7 4.0 - 10.5 K/CU MM    RBC 3.42 (L) 4.6 - 6.2 M/CU MM    Hemoglobin 13.3 (L) 13.5 - 18.0 GM/DL    Hematocrit 36.0 (L) 42 - 52 %    .3 (H) 78 - 100 FL    MCH 38.9 (H) 27 - 31 PG    MCHC 36.9 (H) 32.0 - 36.0 %    RDW 15.8 (H) 11.7 - 14.9 %    Platelets 880 042 - 214 K/CU MM    MPV 10.5 7.5 - 11.1 FL    Differential Type AUTOMATED DIFFERENTIAL     Segs Relative 69.2 (H) 36 - 66 %    Lymphocytes % 22.3 (L) 24 - 44 %    Monocytes % 5.6 (H) 0 - 4 %    Eosinophils % 1.2 0 - 3 %    Basophils % 0.3 0 - 1 %    Segs Absolute 5.3 K/CU MM    Lymphocytes Absolute 1.7 K/CU MM    Monocytes Absolute 0.4 K/CU MM    Eosinophils Absolute 0.1 K/CU MM    Basophils Absolute 0.0 K/CU MM    Nucleated RBC % 0.0 %    Total Nucleated RBC 0.0 K/CU MM    Total Immature Neutrophil 0.11 K/CU MM    Immature Neutrophil % 1.4 (H) 0 - 0.43 %   BMP   Result Value Ref Range    Sodium 136 135 - 145 MMOL/L    Potassium 3.9 3.5 - 5.1 MMOL/L    Chloride 101 99 - 110 mMol/L    CO2 24 21 - 32 MMOL/L    Anion Gap 11 4 - 16    BUN 15 6 - 23 MG/DL    CREATININE 0.6 (L) 0.9 - 1.3 MG/DL    Glucose 176 (H) 70 - 99 MG/DL    Calcium 8.8 8.3 - 10.6 MG/DL    GFR Non-African American >60 >60 mL/min/1.73m2    GFR African American >60 >60 mL/min/1.73m2      Radiographs (if obtained):  [] The following radiograph was interpreted by myself in the absence of a radiologist:  [x] Radiologist's Report Reviewed:    EKG (if obtained): (All EKG's are interpreted by myself in the absence of a cardiologist)    MDM:  Plan of care is discussed thoroughly with the patient and family if present. If performed, all imaging and lab work also discussed with patient. All relevant prior results and chart reviewed if available. Patient presents as above. On arrival, he is hemodynamically stable with a GCS of 15. Presents after mechanical fall without loss of consciousness and a posterior scalp laceration on Coumadin. He is placed on telemetry. Exam otherwise does not show any evidence of acute trauma. He has no midline spine tenderness. Taken to CT for images of head, neck, lower back. Patient's laceration is hemostatic. Images are unremarkable for any evidence of acute abnormality. Patient's INR is therapeutic. Laceration repaired without difficulty. Discharged in good condition. Procedure Note - Laceration repair:  Questions were sought and answered and verbal consent was given by patient for the procedure. The area was prepped and draped in standard bedside fashion. The wound was copiously irrigated with water. The wound was explored with No foreign bodies found. The wound with length of 6cm was repaired with 4 staples. The patient tolerated the procedure well without complications and my repeat neurovascular exam post-procedure is unchanged. Wound care and scar minimization education was provided. Instructions were given to return for increasing pain, redness, streaking, discharge, or any other worsening or worrisome concerns. Clinical Impression:  1. Closed head injury, initial encounter    2.  Laceration of scalp, initial encounter      (Please note that portions of this note may have been completed with a voice recognition program. Efforts were made to edit the dictations but occasionally words are mis-transcribed.)    MD Sol Gan MD  02/04/21 8751

## 2021-03-03 PROBLEM — O22.30 DVT (DEEP VEIN THROMBOSIS) IN PREGNANCY: Status: ACTIVE | Noted: 2021-03-03

## 2021-03-03 PROBLEM — I26.99 PULMONARY EMBOLUS (HCC): Status: ACTIVE | Noted: 2021-03-03

## 2021-03-03 PROBLEM — I10 ESSENTIAL HYPERTENSION: Status: ACTIVE | Noted: 2021-03-03

## 2021-03-03 PROBLEM — Z89.611 S/P AKA (ABOVE KNEE AMPUTATION) UNILATERAL, RIGHT (HCC): Status: ACTIVE | Noted: 2021-03-03

## 2021-05-05 ENCOUNTER — HOSPITAL ENCOUNTER (OUTPATIENT)
Age: 76
Setting detail: SPECIMEN
Discharge: HOME | End: 2021-05-05
Payer: MEDICARE

## 2021-05-05 PROCEDURE — 87086 URINE CULTURE/COLONY COUNT: CPT

## 2021-05-05 PROCEDURE — 81001 URINALYSIS AUTO W/SCOPE: CPT

## 2021-05-05 PROCEDURE — 87186 SC STD MICRODIL/AGAR DIL: CPT

## 2021-05-05 PROCEDURE — 87088 URINE BACTERIA CULTURE: CPT

## 2021-05-06 LAB
BACTERIA: ABNORMAL /HPF
BILIRUBIN URINE: NEGATIVE MG/DL
BLOOD, URINE: NEGATIVE
CLARITY: ABNORMAL
COLOR: YELLOW
GLUCOSE, URINE: NEGATIVE MG/DL
KETONES, URINE: NEGATIVE MG/DL
LEUKOCYTE ESTERASE, URINE: ABNORMAL
NITRITE URINE, QUANTITATIVE: POSITIVE
PH, URINE: 5 (ref 5–8)
PROTEIN UA: 30 MG/DL
RBC URINE: ABNORMAL /HPF (ref 0–3)
SPECIFIC GRAVITY UA: 1.03 (ref 1–1.03)
TRICHOMONAS: ABNORMAL /HPF
UROBILINOGEN, URINE: 2 MG/DL (ref 0.2–1)
WBC UA: 31 /HPF (ref 0–2)

## 2021-05-09 LAB
CULTURE: ABNORMAL
CULTURE: ABNORMAL
Lab: ABNORMAL
SPECIMEN: ABNORMAL

## 2021-06-03 ENCOUNTER — HOSPITAL ENCOUNTER (OUTPATIENT)
Age: 76
Setting detail: SPECIMEN
Discharge: HOME | End: 2021-06-03
Payer: MEDICARE

## 2021-06-03 LAB
ALBUMIN SERPL-MCNC: 4.9 GM/DL (ref 3.4–5)
ALP BLD-CCNC: 106 IU/L (ref 40–129)
ALT SERPL-CCNC: 15 U/L (ref 10–40)
AST SERPL-CCNC: 14 IU/L (ref 15–37)
BILIRUB SERPL-MCNC: 1.1 MG/DL (ref 0–1)
BILIRUBIN DIRECT: 0.3 MG/DL (ref 0–0.3)
BILIRUBIN, INDIRECT: 0.8 MG/DL (ref 0–0.7)
ESTIMATED AVERAGE GLUCOSE: 117 MG/DL
HBA1C MFR BLD: 5.7 % (ref 4.2–6.3)
HCT VFR BLD CALC: 40.2 % (ref 42–52)
HEMOGLOBIN: 13.3 GM/DL (ref 13.5–18)
MCH RBC QN AUTO: 32.2 PG (ref 27–31)
MCHC RBC AUTO-ENTMCNC: 30.2 % (ref 32–36)
MCV RBC AUTO: 106.8 FL (ref 78–100)
PDW BLD-RTO: 12.9 % (ref 11.7–14.9)
PLATELET # BLD: 263 K/CU MM (ref 140–440)
PMV BLD AUTO: 10.2 FL (ref 7.5–11.1)
RBC # BLD: 4.13 M/CU MM (ref 4.6–6.2)
TOTAL PROTEIN: 7.2 GM/DL (ref 6.4–8.2)
WBC # BLD: 5.4 K/CU MM (ref 4–10.5)

## 2021-06-03 PROCEDURE — 83036 HEMOGLOBIN GLYCOSYLATED A1C: CPT

## 2021-06-03 PROCEDURE — 80076 HEPATIC FUNCTION PANEL: CPT

## 2021-06-03 PROCEDURE — 85027 COMPLETE CBC AUTOMATED: CPT

## 2021-06-03 PROCEDURE — 36415 COLL VENOUS BLD VENIPUNCTURE: CPT

## 2021-06-15 ENCOUNTER — HOSPITAL ENCOUNTER (OUTPATIENT)
Age: 76
Setting detail: SPECIMEN
Discharge: HOME | End: 2021-06-15
Payer: MEDICARE

## 2021-06-15 LAB
ALBUMIN SERPL-MCNC: 4.3 GM/DL (ref 3.4–5)
ALP BLD-CCNC: 100 IU/L (ref 40–129)
ALT SERPL-CCNC: 14 U/L (ref 10–40)
AST SERPL-CCNC: 14 IU/L (ref 15–37)
BILIRUB SERPL-MCNC: 1.1 MG/DL (ref 0–1)
BILIRUBIN DIRECT: 0.3 MG/DL (ref 0–0.3)
BILIRUBIN, INDIRECT: 0.8 MG/DL (ref 0–0.7)
TOTAL PROTEIN: 7.1 GM/DL (ref 6.4–8.2)

## 2021-06-15 PROCEDURE — 36415 COLL VENOUS BLD VENIPUNCTURE: CPT

## 2021-06-15 PROCEDURE — 80076 HEPATIC FUNCTION PANEL: CPT

## 2021-06-29 ENCOUNTER — HOSPITAL ENCOUNTER (OUTPATIENT)
Age: 76
Setting detail: SPECIMEN
Discharge: HOME | End: 2021-06-29
Payer: MEDICARE

## 2021-06-29 PROCEDURE — 87186 SC STD MICRODIL/AGAR DIL: CPT

## 2021-06-29 PROCEDURE — 81001 URINALYSIS AUTO W/SCOPE: CPT

## 2021-06-29 PROCEDURE — 87088 URINE BACTERIA CULTURE: CPT

## 2021-06-29 PROCEDURE — 87086 URINE CULTURE/COLONY COUNT: CPT

## 2021-06-30 ENCOUNTER — HOSPITAL ENCOUNTER (OUTPATIENT)
Age: 76
Setting detail: SPECIMEN
Discharge: HOME | End: 2021-06-30
Payer: MEDICARE

## 2021-06-30 LAB
ALBUMIN SERPL-MCNC: 4.6 GM/DL (ref 3.4–5)
ALP BLD-CCNC: 91 IU/L (ref 40–129)
ALT SERPL-CCNC: 12 U/L (ref 10–40)
AMMONIA: 21 UMOL/L (ref 16–60)
ANION GAP SERPL CALCULATED.3IONS-SCNC: 16 MMOL/L (ref 4–16)
AST SERPL-CCNC: 15 IU/L (ref 15–37)
BACTERIA: ABNORMAL /HPF
BILIRUB SERPL-MCNC: 1 MG/DL (ref 0–1)
BILIRUBIN URINE: NEGATIVE MG/DL
BLOOD, URINE: NEGATIVE
BUN BLDV-MCNC: 8 MG/DL (ref 6–23)
CALCIUM SERPL-MCNC: 9 MG/DL (ref 8.3–10.6)
CHLORIDE BLD-SCNC: 98 MMOL/L (ref 99–110)
CLARITY: ABNORMAL
CO2: 22 MMOL/L (ref 21–32)
COLOR: YELLOW
CREAT SERPL-MCNC: 0.5 MG/DL (ref 0.9–1.3)
GFR AFRICAN AMERICAN: >60 ML/MIN/1.73M2
GFR NON-AFRICAN AMERICAN: >60 ML/MIN/1.73M2
GLUCOSE BLD-MCNC: 131 MG/DL (ref 70–99)
GLUCOSE, URINE: NEGATIVE MG/DL
KETONES, URINE: NEGATIVE MG/DL
LEUKOCYTE ESTERASE, URINE: ABNORMAL
MUCUS: ABNORMAL HPF
NITRITE URINE, QUANTITATIVE: NEGATIVE
PH, URINE: 6 (ref 5–8)
POTASSIUM SERPL-SCNC: 4.2 MMOL/L (ref 3.5–5.1)
PROTEIN UA: NEGATIVE MG/DL
RBC URINE: ABNORMAL /HPF (ref 0–3)
SODIUM BLD-SCNC: 136 MMOL/L (ref 135–145)
SPECIFIC GRAVITY UA: 1.01 (ref 1–1.03)
TOTAL PROTEIN: 6.7 GM/DL (ref 6.4–8.2)
TRICHOMONAS: ABNORMAL /HPF
UROBILINOGEN, URINE: NEGATIVE MG/DL (ref 0.2–1)
WBC UA: 3 /HPF (ref 0–2)

## 2021-06-30 PROCEDURE — 82140 ASSAY OF AMMONIA: CPT

## 2021-06-30 PROCEDURE — 36415 COLL VENOUS BLD VENIPUNCTURE: CPT

## 2021-06-30 PROCEDURE — 80053 COMPREHEN METABOLIC PANEL: CPT

## 2021-07-02 LAB
CULTURE: ABNORMAL
CULTURE: ABNORMAL
Lab: ABNORMAL
SPECIMEN: ABNORMAL

## 2021-07-19 ENCOUNTER — HOSPITAL ENCOUNTER (OUTPATIENT)
Age: 76
Setting detail: SPECIMEN
Discharge: HOME | End: 2021-07-19
Payer: MEDICARE

## 2021-07-19 LAB
T3 FREE: 3.2 PG/ML (ref 2.3–4.2)
T4 FREE: 1.16 NG/DL (ref 0.9–1.8)
TSH HIGH SENSITIVITY: 1.26 UIU/ML (ref 0.27–4.2)

## 2021-07-19 PROCEDURE — 84481 FREE ASSAY (FT-3): CPT

## 2021-07-19 PROCEDURE — 84439 ASSAY OF FREE THYROXINE: CPT

## 2021-07-19 PROCEDURE — 84443 ASSAY THYROID STIM HORMONE: CPT

## 2021-07-19 PROCEDURE — 36415 COLL VENOUS BLD VENIPUNCTURE: CPT

## 2021-07-19 PROCEDURE — 86376 MICROSOMAL ANTIBODY EACH: CPT

## 2021-07-19 PROCEDURE — 86800 THYROGLOBULIN ANTIBODY: CPT

## 2021-07-20 LAB
ANTITHYROGLOBULIN AB: <0.9 IU/ML (ref 0–4)
ANTITHYROID MICORSOMAL: <0.3 IU/ML (ref 0–9)

## 2021-08-04 ENCOUNTER — HOSPITAL ENCOUNTER (OUTPATIENT)
Age: 76
Setting detail: SPECIMEN
Discharge: HOME | End: 2021-08-04
Payer: MEDICARE

## 2021-08-04 LAB
ALBUMIN SERPL-MCNC: 4.3 GM/DL (ref 3.4–5)
ALP BLD-CCNC: 107 IU/L (ref 40–128)
ALT SERPL-CCNC: 16 U/L (ref 10–40)
ANION GAP SERPL CALCULATED.3IONS-SCNC: 13 MMOL/L (ref 4–16)
AST SERPL-CCNC: 14 IU/L (ref 15–37)
BILIRUB SERPL-MCNC: 1.1 MG/DL (ref 0–1)
BUN BLDV-MCNC: 9 MG/DL (ref 6–23)
CALCIUM SERPL-MCNC: 9.2 MG/DL (ref 8.3–10.6)
CHLORIDE BLD-SCNC: 100 MMOL/L (ref 99–110)
CO2: 23 MMOL/L (ref 21–32)
CREAT SERPL-MCNC: 0.5 MG/DL (ref 0.9–1.3)
GFR AFRICAN AMERICAN: >60 ML/MIN/1.73M2
GFR NON-AFRICAN AMERICAN: >60 ML/MIN/1.73M2
GLUCOSE BLD-MCNC: 210 MG/DL (ref 70–99)
HCT VFR BLD CALC: 32.8 % (ref 42–52)
HEMOGLOBIN: 13.3 GM/DL (ref 13.5–18)
MCH RBC QN AUTO: 45.4 PG (ref 27–31)
MCHC RBC AUTO-ENTMCNC: 40.5 % (ref 32–36)
MCV RBC AUTO: 111.9 FL (ref 78–100)
PLATELET # BLD: 228 K/CU MM (ref 140–440)
PMV BLD AUTO: 10.5 FL (ref 7.5–11.1)
POTASSIUM SERPL-SCNC: 4.1 MMOL/L (ref 3.5–5.1)
RBC # BLD: 2.93 M/CU MM (ref 4.6–6.2)
SODIUM BLD-SCNC: 136 MMOL/L (ref 135–145)
TOTAL PROTEIN: 6.8 GM/DL (ref 6.4–8.2)
WBC # BLD: 5 K/CU MM (ref 4–10.5)

## 2021-08-04 PROCEDURE — 80053 COMPREHEN METABOLIC PANEL: CPT

## 2021-08-04 PROCEDURE — 36415 COLL VENOUS BLD VENIPUNCTURE: CPT

## 2021-08-04 PROCEDURE — 85027 COMPLETE CBC AUTOMATED: CPT

## 2021-08-11 ENCOUNTER — HOSPITAL ENCOUNTER (OUTPATIENT)
Age: 76
Setting detail: SPECIMEN
Discharge: HOME | End: 2021-08-11
Payer: MEDICARE

## 2021-08-11 PROCEDURE — 88305 TISSUE EXAM BY PATHOLOGIST: CPT | Performed by: PATHOLOGY

## 2021-08-11 PROCEDURE — 88173 CYTOPATH EVAL FNA REPORT: CPT | Performed by: PATHOLOGY

## 2021-08-31 ENCOUNTER — HOSPITAL ENCOUNTER (OUTPATIENT)
Age: 76
Setting detail: SPECIMEN
Discharge: HOME | End: 2021-08-31
Payer: MEDICARE

## 2021-08-31 PROCEDURE — 87086 URINE CULTURE/COLONY COUNT: CPT

## 2021-08-31 PROCEDURE — 81001 URINALYSIS AUTO W/SCOPE: CPT

## 2021-09-01 ENCOUNTER — HOSPITAL ENCOUNTER (OUTPATIENT)
Age: 76
Setting detail: SPECIMEN
Discharge: HOME | End: 2021-09-01
Payer: MEDICARE

## 2021-09-01 LAB
ALBUMIN SERPL-MCNC: 4.1 GM/DL (ref 3.4–5)
ALP BLD-CCNC: 98 IU/L (ref 40–129)
ALT SERPL-CCNC: 14 U/L (ref 10–40)
AMMONIA: 22 UMOL/L (ref 16–60)
ANION GAP SERPL CALCULATED.3IONS-SCNC: 14 MMOL/L (ref 4–16)
AST SERPL-CCNC: 13 IU/L (ref 15–37)
BACTERIA: NEGATIVE /HPF
BILIRUB SERPL-MCNC: 0.9 MG/DL (ref 0–1)
BILIRUBIN URINE: NEGATIVE MG/DL
BLOOD, URINE: NEGATIVE
BUN BLDV-MCNC: 13 MG/DL (ref 6–23)
CALCIUM SERPL-MCNC: 9.2 MG/DL (ref 8.3–10.6)
CHLORIDE BLD-SCNC: 101 MMOL/L (ref 99–110)
CLARITY: ABNORMAL
CO2: 22 MMOL/L (ref 21–32)
COLOR: YELLOW
CREAT SERPL-MCNC: 0.7 MG/DL (ref 0.9–1.3)
GFR AFRICAN AMERICAN: >60 ML/MIN/1.73M2
GFR NON-AFRICAN AMERICAN: >60 ML/MIN/1.73M2
GLUCOSE BLD-MCNC: 225 MG/DL (ref 70–99)
GLUCOSE, URINE: >500 MG/DL
HCT VFR BLD CALC: 36.1 % (ref 42–52)
HEMOGLOBIN: 13 GM/DL (ref 13.5–18)
KETONES, URINE: NEGATIVE MG/DL
LEUKOCYTE ESTERASE, URINE: NEGATIVE
MCH RBC QN AUTO: 38 PG (ref 27–31)
MCHC RBC AUTO-ENTMCNC: 36 % (ref 32–36)
MCV RBC AUTO: 105.6 FL (ref 78–100)
MUCUS: ABNORMAL HPF
NITRITE URINE, QUANTITATIVE: NEGATIVE
PDW BLD-RTO: 15.5 % (ref 11.7–14.9)
PH, URINE: 5 (ref 5–8)
PLATELET # BLD: 242 K/CU MM (ref 140–440)
PMV BLD AUTO: 10.4 FL (ref 7.5–11.1)
POTASSIUM SERPL-SCNC: 3.9 MMOL/L (ref 3.5–5.1)
PROTEIN UA: NEGATIVE MG/DL
RBC # BLD: 3.42 M/CU MM (ref 4.6–6.2)
RBC URINE: ABNORMAL /HPF (ref 0–3)
SODIUM BLD-SCNC: 137 MMOL/L (ref 135–145)
SPECIFIC GRAVITY UA: 1.03 (ref 1–1.03)
TOTAL PROTEIN: 6.7 GM/DL (ref 6.4–8.2)
TRICHOMONAS: ABNORMAL /HPF
UNCLASSIFIED CRYSTAL: ABNORMAL /HPF
UROBILINOGEN, URINE: NEGATIVE MG/DL (ref 0.2–1)
WBC # BLD: 6.4 K/CU MM (ref 4–10.5)
WBC CLUMP: ABNORMAL /HPF
WBC UA: 98 /HPF (ref 0–2)
YEAST: ABNORMAL /HPF

## 2021-09-01 PROCEDURE — 36415 COLL VENOUS BLD VENIPUNCTURE: CPT

## 2021-09-01 PROCEDURE — 82140 ASSAY OF AMMONIA: CPT

## 2021-09-01 PROCEDURE — 85027 COMPLETE CBC AUTOMATED: CPT

## 2021-09-01 PROCEDURE — 80053 COMPREHEN METABOLIC PANEL: CPT

## 2021-09-02 LAB
CULTURE: NORMAL
Lab: NORMAL
SPECIMEN: NORMAL

## 2021-09-29 ENCOUNTER — HOSPITAL ENCOUNTER (OUTPATIENT)
Age: 76
Setting detail: SPECIMEN
Discharge: HOME | End: 2021-09-29
Payer: MEDICARE

## 2021-09-29 LAB
ALBUMIN SERPL-MCNC: 4 GM/DL (ref 3.4–5)
ALP BLD-CCNC: 109 IU/L (ref 40–129)
ALT SERPL-CCNC: 23 U/L (ref 10–40)
ANION GAP SERPL CALCULATED.3IONS-SCNC: 11 MMOL/L (ref 4–16)
AST SERPL-CCNC: 25 IU/L (ref 15–37)
BACTERIA: ABNORMAL /HPF
BILIRUB SERPL-MCNC: 1.6 MG/DL (ref 0–1)
BILIRUBIN URINE: NEGATIVE MG/DL
BLOOD, URINE: NEGATIVE
BUN BLDV-MCNC: 10 MG/DL (ref 6–23)
CALCIUM OXALATE CRYSTALS: ABNORMAL /HPF
CALCIUM SERPL-MCNC: 8.3 MG/DL (ref 8.3–10.6)
CHLORIDE BLD-SCNC: 99 MMOL/L (ref 99–110)
CLARITY: CLEAR
CO2: 23 MMOL/L (ref 21–32)
COLOR: ABNORMAL
CREAT SERPL-MCNC: 0.3 MG/DL (ref 0.9–1.3)
GFR AFRICAN AMERICAN: >60 ML/MIN/1.73M2
GFR NON-AFRICAN AMERICAN: >60 ML/MIN/1.73M2
GLUCOSE BLD-MCNC: 141 MG/DL (ref 70–99)
GLUCOSE, URINE: 50 MG/DL
HCT VFR BLD CALC: 35.3 % (ref 42–52)
HEMOGLOBIN: 11 GM/DL (ref 13.5–18)
KETONES, URINE: NEGATIVE MG/DL
LEUKOCYTE ESTERASE, URINE: NEGATIVE
MCH RBC QN AUTO: 31.6 PG (ref 27–31)
MCHC RBC AUTO-ENTMCNC: 31.2 % (ref 32–36)
MCV RBC AUTO: 101.4 FL (ref 78–100)
MUCUS: ABNORMAL HPF
NITRITE URINE, QUANTITATIVE: NEGATIVE
PDW BLD-RTO: 13.3 % (ref 11.7–14.9)
PH, URINE: 5 (ref 5–8)
PLATELET # BLD: 196 K/CU MM (ref 140–440)
PMV BLD AUTO: 10.4 FL (ref 7.5–11.1)
POTASSIUM SERPL-SCNC: 3.6 MMOL/L (ref 3.5–5.1)
PROTEIN UA: 30 MG/DL
RBC # BLD: 3.48 M/CU MM (ref 4.6–6.2)
RBC URINE: <1 /HPF (ref 0–3)
SODIUM BLD-SCNC: 133 MMOL/L (ref 135–145)
SPECIFIC GRAVITY UA: 1.03 (ref 1–1.03)
SQUAMOUS EPITHELIAL: <1 /HPF
TOTAL PROTEIN: 6.4 GM/DL (ref 6.4–8.2)
TRICHOMONAS: ABNORMAL /HPF
UROBILINOGEN, URINE: 2 MG/DL (ref 0.2–1)
WBC # BLD: 3.3 K/CU MM (ref 4–10.5)
WBC UA: <1 /HPF (ref 0–2)

## 2021-09-29 PROCEDURE — 80053 COMPREHEN METABOLIC PANEL: CPT

## 2021-09-29 PROCEDURE — 36415 COLL VENOUS BLD VENIPUNCTURE: CPT

## 2021-09-29 PROCEDURE — 81001 URINALYSIS AUTO W/SCOPE: CPT

## 2021-09-29 PROCEDURE — 87186 SC STD MICRODIL/AGAR DIL: CPT

## 2021-09-29 PROCEDURE — 87086 URINE CULTURE/COLONY COUNT: CPT

## 2021-09-29 PROCEDURE — 87077 CULTURE AEROBIC IDENTIFY: CPT

## 2021-09-29 PROCEDURE — 85027 COMPLETE CBC AUTOMATED: CPT

## 2021-10-01 LAB
CULTURE: ABNORMAL
CULTURE: ABNORMAL
Lab: ABNORMAL
SPECIMEN: ABNORMAL

## 2021-10-04 ENCOUNTER — HOSPITAL ENCOUNTER (OUTPATIENT)
Age: 76
Setting detail: SPECIMEN
Discharge: HOME | End: 2021-10-04

## 2021-10-04 LAB
ALBUMIN SERPL-MCNC: 3.6 GM/DL (ref 3.4–5)
ALP BLD-CCNC: 110 IU/L (ref 40–128)
ALT SERPL-CCNC: 13 U/L (ref 10–40)
ANION GAP SERPL CALCULATED.3IONS-SCNC: 13 MMOL/L (ref 4–16)
AST SERPL-CCNC: 17 IU/L (ref 15–37)
BILIRUB SERPL-MCNC: 1.2 MG/DL (ref 0–1)
BUN BLDV-MCNC: 9 MG/DL (ref 6–23)
CALCIUM SERPL-MCNC: 8.4 MG/DL (ref 8.3–10.6)
CHLORIDE BLD-SCNC: 101 MMOL/L (ref 99–110)
CO2: 24 MMOL/L (ref 21–32)
CREAT SERPL-MCNC: 0.5 MG/DL (ref 0.9–1.3)
GFR AFRICAN AMERICAN: >60 ML/MIN/1.73M2
GFR NON-AFRICAN AMERICAN: >60 ML/MIN/1.73M2
GLUCOSE BLD-MCNC: 121 MG/DL (ref 70–99)
HCT VFR BLD CALC: 24.2 % (ref 42–52)
HEMOGLOBIN: 10.6 GM/DL (ref 13.5–18)
MCH RBC QN AUTO: 51 PG (ref 27–31)
MCHC RBC AUTO-ENTMCNC: 43.8 % (ref 32–36)
MCV RBC AUTO: 116.3 FL (ref 78–100)
PLATELET # BLD: 229 K/CU MM (ref 140–440)
PMV BLD AUTO: 10.5 FL (ref 7.5–11.1)
POTASSIUM SERPL-SCNC: 3.5 MMOL/L (ref 3.5–5.1)
RBC # BLD: 2.08 M/CU MM (ref 4.6–6.2)
SODIUM BLD-SCNC: 138 MMOL/L (ref 135–145)
TOTAL PROTEIN: 6.4 GM/DL (ref 6.4–8.2)
WBC # BLD: 4.3 K/CU MM (ref 4–10.5)

## 2021-10-04 PROCEDURE — 80053 COMPREHEN METABOLIC PANEL: CPT

## 2021-10-04 PROCEDURE — 85027 COMPLETE CBC AUTOMATED: CPT

## 2021-10-04 PROCEDURE — 36415 COLL VENOUS BLD VENIPUNCTURE: CPT

## 2021-10-07 ENCOUNTER — HOSPITAL ENCOUNTER (OUTPATIENT)
Age: 76
Setting detail: SPECIMEN
Discharge: HOME | End: 2021-10-07

## 2021-10-07 LAB
ALBUMIN SERPL-MCNC: 3.6 GM/DL (ref 3.4–5)
ALP BLD-CCNC: 99 IU/L (ref 40–128)
ALT SERPL-CCNC: 12 U/L (ref 10–40)
ANION GAP SERPL CALCULATED.3IONS-SCNC: 11 MMOL/L (ref 4–16)
AST SERPL-CCNC: 15 IU/L (ref 15–37)
BACTERIA: NEGATIVE /HPF
BILIRUB SERPL-MCNC: 0.8 MG/DL (ref 0–1)
BILIRUBIN URINE: NEGATIVE MG/DL
BLOOD, URINE: NEGATIVE
BUN BLDV-MCNC: 7 MG/DL (ref 6–23)
CALCIUM SERPL-MCNC: 8.4 MG/DL (ref 8.3–10.6)
CHLORIDE BLD-SCNC: 105 MMOL/L (ref 99–110)
CLARITY: CLEAR
CO2: 23 MMOL/L (ref 21–32)
COLOR: ABNORMAL
CREAT SERPL-MCNC: 0.4 MG/DL (ref 0.9–1.3)
ESTIMATED AVERAGE GLUCOSE: 126 MG/DL
GFR AFRICAN AMERICAN: >60 ML/MIN/1.73M2
GFR NON-AFRICAN AMERICAN: >60 ML/MIN/1.73M2
GLUCOSE BLD-MCNC: 117 MG/DL (ref 70–99)
GLUCOSE, URINE: NEGATIVE MG/DL
HBA1C MFR BLD: 6 % (ref 4.2–6.3)
HCT VFR BLD CALC: 34.7 % (ref 42–52)
HEMOGLOBIN: 10.6 GM/DL (ref 13.5–18)
KETONES, URINE: NEGATIVE MG/DL
LEUKOCYTE ESTERASE, URINE: NEGATIVE
MCH RBC QN AUTO: 31.2 PG (ref 27–31)
MCHC RBC AUTO-ENTMCNC: 30.5 % (ref 32–36)
MCV RBC AUTO: 102.1 FL (ref 78–100)
MUCUS: ABNORMAL HPF
NITRITE URINE, QUANTITATIVE: NEGATIVE
PDW BLD-RTO: 13.8 % (ref 11.7–14.9)
PH, URINE: 5 (ref 5–8)
PLATELET # BLD: 313 K/CU MM (ref 140–440)
PMV BLD AUTO: 9.7 FL (ref 7.5–11.1)
POTASSIUM SERPL-SCNC: 3.7 MMOL/L (ref 3.5–5.1)
PROTEIN UA: NEGATIVE MG/DL
RBC # BLD: 3.4 M/CU MM (ref 4.6–6.2)
RBC URINE: <1 /HPF (ref 0–3)
SODIUM BLD-SCNC: 139 MMOL/L (ref 135–145)
SPECIFIC GRAVITY UA: 1.01 (ref 1–1.03)
TOTAL PROTEIN: 6.1 GM/DL (ref 6.4–8.2)
TRICHOMONAS: ABNORMAL /HPF
UROBILINOGEN, URINE: 2 MG/DL (ref 0.2–1)
WBC # BLD: 4 K/CU MM (ref 4–10.5)
WBC UA: 1 /HPF (ref 0–2)

## 2021-10-07 PROCEDURE — 87186 SC STD MICRODIL/AGAR DIL: CPT

## 2021-10-07 PROCEDURE — 36415 COLL VENOUS BLD VENIPUNCTURE: CPT

## 2021-10-07 PROCEDURE — 80053 COMPREHEN METABOLIC PANEL: CPT

## 2021-10-07 PROCEDURE — 83036 HEMOGLOBIN GLYCOSYLATED A1C: CPT

## 2021-10-07 PROCEDURE — 81001 URINALYSIS AUTO W/SCOPE: CPT

## 2021-10-07 PROCEDURE — 85027 COMPLETE CBC AUTOMATED: CPT

## 2021-10-07 PROCEDURE — 87086 URINE CULTURE/COLONY COUNT: CPT

## 2021-10-09 LAB
CULTURE: ABNORMAL
Lab: ABNORMAL
SPECIMEN: ABNORMAL

## 2021-10-11 ENCOUNTER — HOSPITAL ENCOUNTER (OUTPATIENT)
Age: 76
Setting detail: SPECIMEN
Discharge: HOME | End: 2021-10-11

## 2021-10-11 LAB
ALBUMIN SERPL-MCNC: 4 GM/DL (ref 3.4–5)
ALP BLD-CCNC: 95 IU/L (ref 40–129)
ALT SERPL-CCNC: 15 U/L (ref 10–40)
ANION GAP SERPL CALCULATED.3IONS-SCNC: 13 MMOL/L (ref 4–16)
AST SERPL-CCNC: 18 IU/L (ref 15–37)
BILIRUB SERPL-MCNC: 0.8 MG/DL (ref 0–1)
BUN BLDV-MCNC: 8 MG/DL (ref 6–23)
CALCIUM SERPL-MCNC: 8.9 MG/DL (ref 8.3–10.6)
CHLORIDE BLD-SCNC: 105 MMOL/L (ref 99–110)
CO2: 24 MMOL/L (ref 21–32)
CREAT SERPL-MCNC: 0.3 MG/DL (ref 0.9–1.3)
GFR AFRICAN AMERICAN: >60 ML/MIN/1.73M2
GFR NON-AFRICAN AMERICAN: >60 ML/MIN/1.73M2
GLUCOSE BLD-MCNC: 152 MG/DL (ref 70–99)
HCT VFR BLD CALC: 39.1 % (ref 42–52)
HEMOGLOBIN: 12.2 GM/DL (ref 13.5–18)
MCH RBC QN AUTO: 31 PG (ref 27–31)
MCHC RBC AUTO-ENTMCNC: 31.2 % (ref 32–36)
MCV RBC AUTO: 99.5 FL (ref 78–100)
PDW BLD-RTO: 13.9 % (ref 11.7–14.9)
PLATELET # BLD: 315 K/CU MM (ref 140–440)
PMV BLD AUTO: 10.1 FL (ref 7.5–11.1)
POTASSIUM SERPL-SCNC: 3.9 MMOL/L (ref 3.5–5.1)
RBC # BLD: 3.93 M/CU MM (ref 4.6–6.2)
SODIUM BLD-SCNC: 142 MMOL/L (ref 135–145)
TOTAL PROTEIN: 6.4 GM/DL (ref 6.4–8.2)
WBC # BLD: 5 K/CU MM (ref 4–10.5)

## 2021-10-11 PROCEDURE — 85027 COMPLETE CBC AUTOMATED: CPT

## 2021-10-11 PROCEDURE — 36415 COLL VENOUS BLD VENIPUNCTURE: CPT

## 2021-10-11 PROCEDURE — 80053 COMPREHEN METABOLIC PANEL: CPT

## 2021-10-12 ENCOUNTER — HOSPITAL ENCOUNTER (OUTPATIENT)
Age: 76
Setting detail: SPECIMEN
Discharge: HOME | End: 2021-10-12

## 2021-10-12 LAB
HCT VFR BLD CALC: 40.9 % (ref 42–52)
HEMOGLOBIN: 12.3 GM/DL (ref 13.5–18)
MCH RBC QN AUTO: 31.5 PG (ref 27–31)
MCHC RBC AUTO-ENTMCNC: 30.1 % (ref 32–36)
MCV RBC AUTO: 104.6 FL (ref 78–100)
PDW BLD-RTO: 14.3 % (ref 11.7–14.9)
PLATELET # BLD: 295 K/CU MM (ref 140–440)
PMV BLD AUTO: 10.2 FL (ref 7.5–11.1)
RBC # BLD: 3.91 M/CU MM (ref 4.6–6.2)
WBC # BLD: 5.2 K/CU MM (ref 4–10.5)

## 2021-10-12 PROCEDURE — 36415 COLL VENOUS BLD VENIPUNCTURE: CPT

## 2021-10-12 PROCEDURE — 85027 COMPLETE CBC AUTOMATED: CPT

## 2021-10-13 ENCOUNTER — HOSPITAL ENCOUNTER (OUTPATIENT)
Age: 76
Setting detail: SPECIMEN
Discharge: HOME | End: 2021-10-13

## 2021-10-13 LAB
HCT VFR BLD CALC: 35 % (ref 42–52)
HEMOGLOBIN: 11.7 GM/DL (ref 13.5–18)
MCH RBC QN AUTO: 34.5 PG (ref 27–31)
MCHC RBC AUTO-ENTMCNC: 33.4 % (ref 32–36)
MCV RBC AUTO: 103.2 FL (ref 78–100)
PDW BLD-RTO: 13.6 % (ref 11.7–14.9)
PLATELET # BLD: 276 K/CU MM (ref 140–440)
PMV BLD AUTO: 10.2 FL (ref 7.5–11.1)
RBC # BLD: 3.39 M/CU MM (ref 4.6–6.2)
WBC # BLD: 4.9 K/CU MM (ref 4–10.5)

## 2021-10-13 PROCEDURE — 36415 COLL VENOUS BLD VENIPUNCTURE: CPT

## 2021-10-13 PROCEDURE — 85027 COMPLETE CBC AUTOMATED: CPT

## 2021-10-27 NOTE — PROGRESS NOTES
VANCO RENAL INSUFFICIENCY NOTE INITIAL   [facility] 9074 Pharmacokinetic Monitoring Service - Vancomycin    Rob Will is a 71 y.o. female starting on vancomycin therapy for Sepsis. Pharmacy consulted by Dr. Lia Echevarria for monitoring and adjustment. Target Concentration: Random level ? 15 mg/L  Additional Antimicrobials: Doxy, Cefepime    Pertinent Laboratory Values: Wt Readings from Last 1 Encounters:   10/27/21 142.7 kg (314 lb 9.5 oz)     Temp Readings from Last 1 Encounters:   10/27/21 99.2 °F (37.3 °C)     Recent Labs     10/27/21  0552 10/27/21  0551 10/26/21  0751 10/25/21  1325   BUN  --  25* 17  --    CREA  --  1.90* 1.26*  --    WBC 27.0*  --  25.1* 15.5*       MRSA Nasal Swab: N/A. Non-respiratory infection. .    Plan:  Concentration-guided dosing due to renal impairment  Start vancomycin 2500 mg X 1, then dose intermittently  Vancomycin concentration ordered for 10/28 @ 1800  Pharmacy will continue to monitor patient and adjust therapy as indicated    Thank you for the consult,  WILLY Sal Magnesium 2.0 1.8 - 2.4 mg/dl   CBC Auto Differential    Collection Time: 05/05/20  4:17 AM   Result Value Ref Range    WBC 9.2 4.0 - 10.5 K/CU MM    RBC 3.04 (L) 4.6 - 6.2 M/CU MM    Hemoglobin 10.5 (L) 13.5 - 18.0 GM/DL    Hematocrit 31.0 (L) 42 - 52 %    .0 (H) 78 - 100 FL    MCH 34.5 (H) 27 - 31 PG    MCHC 33.9 32.0 - 36.0 %    RDW 13.5 11.7 - 14.9 %    Platelets 393 773 - 988 K/CU MM    MPV 10.3 7.5 - 11.1 FL    Differential Type AUTOMATED DIFFERENTIAL     Segs Relative 82.2 (H) 36 - 66 %    Lymphocytes % 9.8 (L) 24 - 44 %    Monocytes % 5.0 (H) 0 - 4 %    Eosinophils % 2.3 0 - 3 %    Basophils % 0.2 0 - 1 %    Segs Absolute 7.6 K/CU MM    Lymphocytes Absolute 0.9 K/CU MM    Monocytes Absolute 0.5 K/CU MM    Eosinophils Absolute 0.2 K/CU MM    Basophils Absolute 0.0 K/CU MM    Nucleated RBC % 0.0 %    Total Nucleated RBC 0.0 K/CU MM    Total Immature Neutrophil 0.05 K/CU MM    Immature Neutrophil % 0.5 (H) 0 - 0.43 %         Assessment:  Shayla Simons is a 75 y.o. male with jaundice and CBD stones/sludge on MRCP who is s/p:   POD 4 4/30/20 attempted ERCP (unable to cannulate due to flat/friable papilla)  POD 3 5/1/20 laparoscopic cholecystectomy with IOC  POD 1 5/3/20 ERCP (Dr. Senia De) with placement of 7F x 7cm stent     Principal Problem:    Choledocholithiasis with obstruction  Active Problems:    Jaundice    Cholelithiasis with obstruction  Resolved Problems:    * No resolved hospital problems. *      Plan:  · Appreciate Dr. Chioma Dent assistance and recommendations - will plan on stent removal in 2-3 weeks per his op note. His drain output and CMP appears to be improving. · Advancing as tolerated to a low fat diet  · Coumadin restarted f  · Monitor drain output and labs. Will plan to send home with the drain in place, and remove after it no longer is draining bile - likely will wait unitl after he has the CBD stent removed in a few weeks.  He lives at the [facility], per CM, will be on the skilled

## 2022-01-25 ENCOUNTER — HOSPITAL ENCOUNTER (OUTPATIENT)
Age: 77
Setting detail: SPECIMEN
Discharge: HOME | End: 2022-01-25
Payer: MEDICARE

## 2022-01-25 LAB
ALBUMIN SERPL-MCNC: 3.7 GM/DL (ref 3.4–5)
ALP BLD-CCNC: 79 IU/L (ref 40–129)
ALT SERPL-CCNC: 20 U/L (ref 10–40)
ANION GAP SERPL CALCULATED.3IONS-SCNC: 14 MMOL/L (ref 4–16)
AST SERPL-CCNC: 28 IU/L (ref 15–37)
BILIRUB SERPL-MCNC: 1.2 MG/DL (ref 0–1)
BUN BLDV-MCNC: 8 MG/DL (ref 6–23)
CALCIUM SERPL-MCNC: 8.8 MG/DL (ref 8.3–10.6)
CHLORIDE BLD-SCNC: 100 MMOL/L (ref 99–110)
CHOLESTEROL: 131 MG/DL
CO2: 23 MMOL/L (ref 21–32)
CREAT SERPL-MCNC: 0.2 MG/DL (ref 0.9–1.3)
GFR AFRICAN AMERICAN: >60 ML/MIN/1.73M2
GFR NON-AFRICAN AMERICAN: >60 ML/MIN/1.73M2
GLUCOSE BLD-MCNC: 288 MG/DL (ref 70–99)
HCT VFR BLD CALC: 42.1 % (ref 42–52)
HDLC SERPL-MCNC: 35 MG/DL
HEMOGLOBIN: 13.6 GM/DL (ref 13.5–18)
LDL CHOLESTEROL DIRECT: 72 MG/DL
MCH RBC QN AUTO: 33.5 PG (ref 27–31)
MCHC RBC AUTO-ENTMCNC: 32.3 % (ref 32–36)
MCV RBC AUTO: 103.7 FL (ref 78–100)
PDW BLD-RTO: 12.7 % (ref 11.7–14.9)
PLATELET # BLD: 189 K/CU MM (ref 140–440)
PMV BLD AUTO: 11.4 FL (ref 7.5–11.1)
POTASSIUM SERPL-SCNC: 4.2 MMOL/L (ref 3.5–5.1)
RBC # BLD: 4.06 M/CU MM (ref 4.6–6.2)
SODIUM BLD-SCNC: 137 MMOL/L (ref 135–145)
TOTAL PROTEIN: 6.2 GM/DL (ref 6.4–8.2)
TRIGL SERPL-MCNC: 208 MG/DL
WBC # BLD: 4.7 K/CU MM (ref 4–10.5)

## 2022-01-25 PROCEDURE — 80061 LIPID PANEL: CPT

## 2022-01-25 PROCEDURE — 36415 COLL VENOUS BLD VENIPUNCTURE: CPT

## 2022-01-25 PROCEDURE — 83721 ASSAY OF BLOOD LIPOPROTEIN: CPT

## 2022-01-25 PROCEDURE — 85027 COMPLETE CBC AUTOMATED: CPT

## 2022-01-25 PROCEDURE — 80053 COMPREHEN METABOLIC PANEL: CPT

## 2022-02-03 ENCOUNTER — HOSPITAL ENCOUNTER (OUTPATIENT)
Age: 77
Setting detail: SPECIMEN
Discharge: HOME | End: 2022-02-03
Payer: MEDICARE

## 2022-02-03 LAB
ALBUMIN SERPL-MCNC: 3.9 GM/DL (ref 3.4–5)
ALP BLD-CCNC: 88 IU/L (ref 40–129)
ALT SERPL-CCNC: 22 U/L (ref 10–40)
ANION GAP SERPL CALCULATED.3IONS-SCNC: 10 MMOL/L (ref 4–16)
AST SERPL-CCNC: 20 IU/L (ref 15–37)
BACTERIA: NEGATIVE /HPF
BILIRUB SERPL-MCNC: 1 MG/DL (ref 0–1)
BILIRUBIN URINE: NEGATIVE MG/DL
BLOOD, URINE: ABNORMAL
BUN BLDV-MCNC: 10 MG/DL (ref 6–23)
CALCIUM SERPL-MCNC: 9.2 MG/DL (ref 8.3–10.6)
CHLORIDE BLD-SCNC: 97 MMOL/L (ref 99–110)
CLARITY: CLEAR
CO2: 27 MMOL/L (ref 21–32)
COLOR: YELLOW
CREAT SERPL-MCNC: 0.4 MG/DL (ref 0.9–1.3)
GFR AFRICAN AMERICAN: >60 ML/MIN/1.73M2
GFR NON-AFRICAN AMERICAN: >60 ML/MIN/1.73M2
GLUCOSE BLD-MCNC: 339 MG/DL (ref 70–99)
GLUCOSE, URINE: >1000 MG/DL
HCT VFR BLD CALC: 43.3 % (ref 42–52)
HEMOGLOBIN: 14 GM/DL (ref 13.5–18)
KETONES, URINE: NEGATIVE MG/DL
LEUKOCYTE ESTERASE, URINE: NEGATIVE
MCH RBC QN AUTO: 32.4 PG (ref 27–31)
MCHC RBC AUTO-ENTMCNC: 32.3 % (ref 32–36)
MCV RBC AUTO: 100.2 FL (ref 78–100)
MUCUS: ABNORMAL HPF
NITRITE URINE, QUANTITATIVE: NEGATIVE
PDW BLD-RTO: 12.7 % (ref 11.7–14.9)
PH, URINE: 6 (ref 5–8)
PLATELET # BLD: 218 K/CU MM (ref 140–440)
PMV BLD AUTO: 11.3 FL (ref 7.5–11.1)
POTASSIUM SERPL-SCNC: 4.2 MMOL/L (ref 3.5–5.1)
PROTEIN UA: NEGATIVE MG/DL
RBC # BLD: 4.32 M/CU MM (ref 4.6–6.2)
RBC URINE: 15 /HPF (ref 0–3)
SODIUM BLD-SCNC: 134 MMOL/L (ref 135–145)
SPECIFIC GRAVITY UA: 1.02 (ref 1–1.03)
TOTAL PROTEIN: 6.5 GM/DL (ref 6.4–8.2)
UROBILINOGEN, URINE: 2 MG/DL (ref 0.2–1)
WBC # BLD: 5.1 K/CU MM (ref 4–10.5)
WBC UA: 2 /HPF (ref 0–2)

## 2022-02-03 PROCEDURE — 85027 COMPLETE CBC AUTOMATED: CPT

## 2022-02-03 PROCEDURE — 81001 URINALYSIS AUTO W/SCOPE: CPT

## 2022-02-03 PROCEDURE — 87086 URINE CULTURE/COLONY COUNT: CPT

## 2022-02-03 PROCEDURE — 36415 COLL VENOUS BLD VENIPUNCTURE: CPT

## 2022-02-03 PROCEDURE — 80053 COMPREHEN METABOLIC PANEL: CPT

## 2022-02-04 LAB
CULTURE: NORMAL
Lab: NORMAL
SPECIMEN: NORMAL

## 2022-02-07 ENCOUNTER — HOSPITAL ENCOUNTER (OUTPATIENT)
Age: 77
Setting detail: SPECIMEN
Discharge: HOME | End: 2022-02-07
Payer: MEDICARE

## 2022-02-07 LAB
ESTIMATED AVERAGE GLUCOSE: 177 MG/DL
HBA1C MFR BLD: 7.8 % (ref 4.2–6.3)

## 2022-02-07 PROCEDURE — 83036 HEMOGLOBIN GLYCOSYLATED A1C: CPT

## 2022-02-07 PROCEDURE — 36415 COLL VENOUS BLD VENIPUNCTURE: CPT

## 2022-03-18 ENCOUNTER — HOSPITAL ENCOUNTER (OUTPATIENT)
Age: 77
Setting detail: SPECIMEN
Discharge: HOME | End: 2022-03-18
Payer: MEDICARE

## 2022-03-18 LAB
ANION GAP SERPL CALCULATED.3IONS-SCNC: 13 MMOL/L (ref 4–16)
BUN BLDV-MCNC: 14 MG/DL (ref 6–23)
CALCIUM SERPL-MCNC: 8.9 MG/DL (ref 8.3–10.6)
CHLORIDE BLD-SCNC: 100 MMOL/L (ref 99–110)
CO2: 24 MMOL/L (ref 21–32)
CREAT SERPL-MCNC: 0.4 MG/DL (ref 0.9–1.3)
GFR AFRICAN AMERICAN: >60 ML/MIN/1.73M2
GFR NON-AFRICAN AMERICAN: >60 ML/MIN/1.73M2
GLUCOSE BLD-MCNC: 194 MG/DL (ref 70–99)
HCT VFR BLD CALC: 38.1 % (ref 42–52)
HEMOGLOBIN: 13 GM/DL (ref 13.5–18)
MCH RBC QN AUTO: 36.8 PG (ref 27–31)
MCHC RBC AUTO-ENTMCNC: 34.1 % (ref 32–36)
MCV RBC AUTO: 107.9 FL (ref 78–100)
PDW BLD-RTO: 13.9 % (ref 11.7–14.9)
PLATELET # BLD: 230 K/CU MM (ref 140–440)
PMV BLD AUTO: 10.9 FL (ref 7.5–11.1)
POTASSIUM SERPL-SCNC: 4.2 MMOL/L (ref 3.5–5.1)
RBC # BLD: 3.53 M/CU MM (ref 4.6–6.2)
SODIUM BLD-SCNC: 137 MMOL/L (ref 135–145)
WBC # BLD: 6.1 K/CU MM (ref 4–10.5)

## 2022-03-18 PROCEDURE — 85027 COMPLETE CBC AUTOMATED: CPT

## 2022-03-18 PROCEDURE — 36415 COLL VENOUS BLD VENIPUNCTURE: CPT

## 2022-03-18 PROCEDURE — 80048 BASIC METABOLIC PNL TOTAL CA: CPT

## 2022-03-19 ENCOUNTER — HOSPITAL ENCOUNTER (OUTPATIENT)
Age: 77
Setting detail: SPECIMEN
Discharge: HOME | End: 2022-03-19
Payer: MEDICARE

## 2022-03-19 PROCEDURE — 87086 URINE CULTURE/COLONY COUNT: CPT

## 2022-03-19 PROCEDURE — 81001 URINALYSIS AUTO W/SCOPE: CPT

## 2022-03-20 LAB
BACTERIA: NEGATIVE /HPF
BILIRUBIN URINE: NEGATIVE MG/DL
BLOOD, URINE: NEGATIVE
CALCIUM OXALATE CRYSTALS: ABNORMAL /HPF
CLARITY: CLEAR
COLOR: YELLOW
CULTURE: NORMAL
GLUCOSE, URINE: >1000 MG/DL
KETONES, URINE: NEGATIVE MG/DL
LEUKOCYTE ESTERASE, URINE: NEGATIVE
Lab: NORMAL
MUCUS: ABNORMAL HPF
NITRITE URINE, QUANTITATIVE: NEGATIVE
PH, URINE: 6 (ref 5–8)
PROTEIN UA: NEGATIVE MG/DL
RBC URINE: 1 /HPF (ref 0–3)
SPECIFIC GRAVITY UA: 1.02 (ref 1–1.03)
SPECIMEN: NORMAL
SQUAMOUS EPITHELIAL: <1 /HPF
TRICHOMONAS: ABNORMAL /HPF
UROBILINOGEN, URINE: 0.2 MG/DL (ref 0.2–1)
WBC UA: 5 /HPF (ref 0–2)

## 2022-04-24 ENCOUNTER — HOSPITAL ENCOUNTER (OUTPATIENT)
Age: 77
Setting detail: SPECIMEN
Discharge: HOME | End: 2022-04-24
Payer: MEDICARE

## 2022-04-24 LAB
ALBUMIN SERPL-MCNC: 3.9 GM/DL (ref 3.4–5)
ALP BLD-CCNC: 79 IU/L (ref 40–128)
ALT SERPL-CCNC: 33 U/L (ref 10–40)
ANION GAP SERPL CALCULATED.3IONS-SCNC: 11 MMOL/L (ref 4–16)
AST SERPL-CCNC: 28 IU/L (ref 15–37)
BILIRUB SERPL-MCNC: 1.2 MG/DL (ref 0–1)
BUN BLDV-MCNC: 15 MG/DL (ref 6–23)
CALCIUM SERPL-MCNC: 9 MG/DL (ref 8.3–10.6)
CHLORIDE BLD-SCNC: 103 MMOL/L (ref 99–110)
CO2: 25 MMOL/L (ref 21–32)
CREAT SERPL-MCNC: 0.5 MG/DL (ref 0.9–1.3)
GFR AFRICAN AMERICAN: >60 ML/MIN/1.73M2
GFR NON-AFRICAN AMERICAN: >60 ML/MIN/1.73M2
GLUCOSE BLD-MCNC: 250 MG/DL (ref 70–99)
HCT VFR BLD CALC: 35.5 % (ref 42–52)
HEMOGLOBIN: 12.4 GM/DL (ref 13.5–18)
MCH RBC QN AUTO: 37.7 PG (ref 27–31)
MCHC RBC AUTO-ENTMCNC: 34.9 % (ref 32–36)
MCV RBC AUTO: 107.9 FL (ref 78–100)
PDW BLD-RTO: 14.6 % (ref 11.7–14.9)
PLATELET # BLD: 204 K/CU MM (ref 140–440)
PMV BLD AUTO: 11.3 FL (ref 7.5–11.1)
POTASSIUM SERPL-SCNC: 4.2 MMOL/L (ref 3.5–5.1)
RBC # BLD: 3.29 M/CU MM (ref 4.6–6.2)
SODIUM BLD-SCNC: 139 MMOL/L (ref 135–145)
TOTAL PROTEIN: 6.6 GM/DL (ref 6.4–8.2)
WBC # BLD: 4.1 K/CU MM (ref 4–10.5)

## 2022-04-24 PROCEDURE — 36415 COLL VENOUS BLD VENIPUNCTURE: CPT

## 2022-04-24 PROCEDURE — 81001 URINALYSIS AUTO W/SCOPE: CPT

## 2022-04-24 PROCEDURE — 87086 URINE CULTURE/COLONY COUNT: CPT

## 2022-04-24 PROCEDURE — 87804 INFLUENZA ASSAY W/OPTIC: CPT

## 2022-04-24 PROCEDURE — 80053 COMPREHEN METABOLIC PANEL: CPT

## 2022-04-24 PROCEDURE — 85027 COMPLETE CBC AUTOMATED: CPT

## 2022-04-25 LAB
AMORPHOUS: ABNORMAL /HPF
BACTERIA: NEGATIVE /HPF
BILIRUBIN URINE: NEGATIVE MG/DL
BLOOD, URINE: NEGATIVE
CLARITY: CLEAR
COLOR: YELLOW
GLUCOSE, URINE: >1000 MG/DL
KETONES, URINE: NEGATIVE MG/DL
LEUKOCYTE ESTERASE, URINE: NEGATIVE
MUCUS: ABNORMAL HPF
NITRITE URINE, QUANTITATIVE: NEGATIVE
PH, URINE: 5.5 (ref 5–8)
PROTEIN UA: ABNORMAL MG/DL
RAPID INFLUENZA  B AGN: NEGATIVE
RAPID INFLUENZA A AGN: NEGATIVE
RBC URINE: ABNORMAL /HPF (ref 0–3)
SPECIFIC GRAVITY UA: >1.03 (ref 1–1.03)
TRICHOMONAS: ABNORMAL /HPF
UROBILINOGEN, URINE: 0.2 MG/DL (ref 0.2–1)
WBC UA: <1 /HPF (ref 0–2)

## 2022-05-02 ENCOUNTER — HOSPITAL ENCOUNTER (OUTPATIENT)
Age: 77
Setting detail: SPECIMEN
Discharge: HOME | End: 2022-05-02
Payer: MEDICARE

## 2022-05-02 LAB
T4 FREE: 1.12 NG/DL (ref 0.9–1.8)
TSH HIGH SENSITIVITY: 0.98 UIU/ML (ref 0.27–4.2)

## 2022-05-02 PROCEDURE — 36415 COLL VENOUS BLD VENIPUNCTURE: CPT

## 2022-05-02 PROCEDURE — 84443 ASSAY THYROID STIM HORMONE: CPT

## 2022-05-02 PROCEDURE — 84439 ASSAY OF FREE THYROXINE: CPT

## 2022-05-09 ENCOUNTER — HOSPITAL ENCOUNTER (OUTPATIENT)
Age: 77
Setting detail: SPECIMEN
Discharge: HOME | End: 2022-05-09
Payer: MEDICARE

## 2022-05-09 LAB
ANION GAP SERPL CALCULATED.3IONS-SCNC: 17 MMOL/L (ref 4–16)
BUN BLDV-MCNC: 16 MG/DL (ref 6–23)
CALCIUM SERPL-MCNC: 9.5 MG/DL (ref 8.3–10.6)
CHLORIDE BLD-SCNC: 101 MMOL/L (ref 99–110)
CO2: 19 MMOL/L (ref 21–32)
CREAT SERPL-MCNC: 0.5 MG/DL (ref 0.9–1.3)
GFR AFRICAN AMERICAN: >60 ML/MIN/1.73M2
GFR NON-AFRICAN AMERICAN: >60 ML/MIN/1.73M2
GLUCOSE BLD-MCNC: 206 MG/DL (ref 70–99)
HCT VFR BLD CALC: 40.8 % (ref 42–52)
HEMOGLOBIN: 14 GM/DL (ref 13.5–18)
MCH RBC QN AUTO: 36.7 PG (ref 27–31)
MCHC RBC AUTO-ENTMCNC: 34.3 % (ref 32–36)
MCV RBC AUTO: 107.1 FL (ref 78–100)
PDW BLD-RTO: 13.5 % (ref 11.7–14.9)
PLATELET # BLD: 263 K/CU MM (ref 140–440)
PMV BLD AUTO: 11.4 FL (ref 7.5–11.1)
POTASSIUM SERPL-SCNC: 4.5 MMOL/L (ref 3.5–5.1)
RBC # BLD: 3.81 M/CU MM (ref 4.6–6.2)
SODIUM BLD-SCNC: 137 MMOL/L (ref 135–145)
WBC # BLD: 7.8 K/CU MM (ref 4–10.5)

## 2022-05-09 PROCEDURE — 36415 COLL VENOUS BLD VENIPUNCTURE: CPT

## 2022-05-09 PROCEDURE — 85027 COMPLETE CBC AUTOMATED: CPT

## 2022-05-09 PROCEDURE — 81001 URINALYSIS AUTO W/SCOPE: CPT

## 2022-05-09 PROCEDURE — 87086 URINE CULTURE/COLONY COUNT: CPT

## 2022-05-09 PROCEDURE — 80048 BASIC METABOLIC PNL TOTAL CA: CPT

## 2022-05-10 LAB
BACTERIA: ABNORMAL /HPF
BILIRUBIN URINE: NEGATIVE MG/DL
BLOOD, URINE: NEGATIVE
CLARITY: ABNORMAL
COLOR: ABNORMAL
GLUCOSE, URINE: NEGATIVE MG/DL
KETONES, URINE: NEGATIVE MG/DL
LEUKOCYTE ESTERASE, URINE: NEGATIVE
MUCUS: ABNORMAL HPF
NITRITE URINE, QUANTITATIVE: NEGATIVE
PH, URINE: 6 (ref 5–8)
PROTEIN UA: ABNORMAL MG/DL
RBC URINE: ABNORMAL /HPF (ref 0–3)
SPECIFIC GRAVITY UA: 1.03 (ref 1–1.03)
SQUAMOUS EPITHELIAL: 17 /HPF
TRICHOMONAS: ABNORMAL /HPF
UNCLASSIFIED CRYSTAL: ABNORMAL /HPF
UROBILINOGEN, URINE: ABNORMAL MG/DL (ref 0.2–1)
WBC CLUMP: ABNORMAL /HPF
WBC UA: 32 /HPF (ref 0–2)
YEAST: ABNORMAL /HPF

## 2022-05-11 LAB
CULTURE: NORMAL
Lab: NORMAL
SPECIMEN: NORMAL

## 2022-06-03 ENCOUNTER — HOSPITAL ENCOUNTER (OUTPATIENT)
Age: 77
Setting detail: SPECIMEN
Discharge: HOME | End: 2022-06-03
Payer: MEDICARE

## 2022-06-03 PROCEDURE — 87086 URINE CULTURE/COLONY COUNT: CPT

## 2022-06-03 PROCEDURE — 81001 URINALYSIS AUTO W/SCOPE: CPT

## 2022-06-04 LAB
BACTERIA: NEGATIVE /HPF
BILIRUBIN URINE: NEGATIVE MG/DL
BLOOD, URINE: NEGATIVE
CLARITY: CLEAR
COLOR: YELLOW
GLUCOSE, URINE: NEGATIVE MG/DL
KETONES, URINE: NEGATIVE MG/DL
LEUKOCYTE ESTERASE, URINE: NEGATIVE
NITRITE URINE, QUANTITATIVE: NEGATIVE
PH, URINE: 6.5 (ref 5–8)
PROTEIN UA: NEGATIVE MG/DL
RBC URINE: <1 /HPF (ref 0–3)
SPECIFIC GRAVITY UA: 1.01 (ref 1–1.03)
SQUAMOUS EPITHELIAL: <1 /HPF
TRICHOMONAS: ABNORMAL /HPF
UROBILINOGEN, URINE: 2 MG/DL (ref 0.2–1)
WBC UA: <1 /HPF (ref 0–2)

## 2022-06-05 LAB
CULTURE: NORMAL
Lab: NORMAL
SPECIMEN: NORMAL

## 2022-06-06 ENCOUNTER — HOSPITAL ENCOUNTER (OUTPATIENT)
Age: 77
Setting detail: SPECIMEN
Discharge: HOME | End: 2022-06-06
Payer: COMMERCIAL

## 2022-06-06 LAB
ESTIMATED AVERAGE GLUCOSE: 128 MG/DL
HBA1C MFR BLD: 6.1 % (ref 4.2–6.3)

## 2022-06-06 PROCEDURE — 36415 COLL VENOUS BLD VENIPUNCTURE: CPT

## 2022-06-06 PROCEDURE — 83036 HEMOGLOBIN GLYCOSYLATED A1C: CPT

## 2022-06-10 ENCOUNTER — HOSPITAL ENCOUNTER (OUTPATIENT)
Age: 77
Setting detail: SPECIMEN
Discharge: HOME | End: 2022-06-10
Payer: MEDICARE

## 2022-06-10 LAB
ALBUMIN SERPL-MCNC: 4.2 GM/DL (ref 3.4–5)
ALP BLD-CCNC: 72 IU/L (ref 40–128)
ALT SERPL-CCNC: 26 U/L (ref 10–40)
ANION GAP SERPL CALCULATED.3IONS-SCNC: 16 MMOL/L (ref 4–16)
AST SERPL-CCNC: 20 IU/L (ref 15–37)
BILIRUB SERPL-MCNC: 0.9 MG/DL (ref 0–1)
BUN BLDV-MCNC: 14 MG/DL (ref 6–23)
CALCIUM SERPL-MCNC: 9.1 MG/DL (ref 8.3–10.6)
CHLORIDE BLD-SCNC: 100 MMOL/L (ref 99–110)
CHOLESTEROL: 130 MG/DL
CO2: 22 MMOL/L (ref 21–32)
CREAT SERPL-MCNC: 0.4 MG/DL (ref 0.9–1.3)
GFR AFRICAN AMERICAN: >60 ML/MIN/1.73M2
GFR NON-AFRICAN AMERICAN: >60 ML/MIN/1.73M2
GLUCOSE BLD-MCNC: 169 MG/DL (ref 70–99)
HDLC SERPL-MCNC: 44 MG/DL
LDL CHOLESTEROL CALCULATED: 65 MG/DL
POTASSIUM SERPL-SCNC: 4.3 MMOL/L (ref 3.5–5.1)
SODIUM BLD-SCNC: 138 MMOL/L (ref 135–145)
TOTAL PROTEIN: 6.9 GM/DL (ref 6.4–8.2)
TRIGL SERPL-MCNC: 106 MG/DL

## 2022-06-10 PROCEDURE — 83036 HEMOGLOBIN GLYCOSYLATED A1C: CPT

## 2022-06-10 PROCEDURE — 80053 COMPREHEN METABOLIC PANEL: CPT

## 2022-06-10 PROCEDURE — 36415 COLL VENOUS BLD VENIPUNCTURE: CPT

## 2022-06-10 PROCEDURE — 80061 LIPID PANEL: CPT

## 2022-07-01 ENCOUNTER — HOSPITAL ENCOUNTER (OUTPATIENT)
Age: 77
Setting detail: SPECIMEN
Discharge: HOME | End: 2022-07-01
Payer: MEDICARE

## 2022-07-01 LAB
ALBUMIN SERPL-MCNC: 4.1 GM/DL (ref 3.4–5)
ALP BLD-CCNC: 69 IU/L (ref 40–128)
ALT SERPL-CCNC: 20 U/L (ref 10–40)
ANION GAP SERPL CALCULATED.3IONS-SCNC: 14 MMOL/L (ref 4–16)
AST SERPL-CCNC: 20 IU/L (ref 15–37)
BILIRUB SERPL-MCNC: 1 MG/DL (ref 0–1)
BUN BLDV-MCNC: 8 MG/DL (ref 6–23)
CALCIUM SERPL-MCNC: 9.1 MG/DL (ref 8.3–10.6)
CHLORIDE BLD-SCNC: 101 MMOL/L (ref 99–110)
CO2: 24 MMOL/L (ref 21–32)
CREAT SERPL-MCNC: 0.4 MG/DL (ref 0.9–1.3)
GFR AFRICAN AMERICAN: >60 ML/MIN/1.73M2
GFR NON-AFRICAN AMERICAN: >60 ML/MIN/1.73M2
GLUCOSE BLD-MCNC: 123 MG/DL (ref 70–99)
HCT VFR BLD CALC: 38.4 % (ref 42–52)
HEMOGLOBIN: 12.6 GM/DL (ref 13.5–18)
MCH RBC QN AUTO: 33.4 PG (ref 27–31)
MCHC RBC AUTO-ENTMCNC: 32.8 % (ref 32–36)
MCV RBC AUTO: 101.9 FL (ref 78–100)
PDW BLD-RTO: 12.9 % (ref 11.7–14.9)
PLATELET # BLD: 216 K/CU MM (ref 140–440)
PMV BLD AUTO: 11.2 FL (ref 7.5–11.1)
POTASSIUM SERPL-SCNC: 4 MMOL/L (ref 3.5–5.1)
RBC # BLD: 3.77 M/CU MM (ref 4.6–6.2)
SODIUM BLD-SCNC: 139 MMOL/L (ref 135–145)
TOTAL PROTEIN: 6.4 GM/DL (ref 6.4–8.2)
WBC # BLD: 5.4 K/CU MM (ref 4–10.5)

## 2022-07-01 PROCEDURE — 85027 COMPLETE CBC AUTOMATED: CPT

## 2022-07-01 PROCEDURE — 80053 COMPREHEN METABOLIC PANEL: CPT

## 2022-07-01 PROCEDURE — 36415 COLL VENOUS BLD VENIPUNCTURE: CPT

## 2022-08-07 ENCOUNTER — HOSPITAL ENCOUNTER (OUTPATIENT)
Age: 77
Setting detail: SPECIMEN
Discharge: HOME | End: 2022-08-07
Payer: COMMERCIAL

## 2022-08-07 PROCEDURE — 87086 URINE CULTURE/COLONY COUNT: CPT

## 2022-08-07 PROCEDURE — 81001 URINALYSIS AUTO W/SCOPE: CPT

## 2022-08-08 LAB
BACTERIA: NEGATIVE /HPF
BILIRUBIN URINE: NEGATIVE MG/DL
BLOOD, URINE: NEGATIVE
CLARITY: CLEAR
COLOR: YELLOW
GLUCOSE, URINE: 100 MG/DL
KETONES, URINE: NEGATIVE MG/DL
LEUKOCYTE ESTERASE, URINE: NEGATIVE
NITRITE URINE, QUANTITATIVE: NEGATIVE
PH, URINE: 7 (ref 5–8)
PROTEIN UA: NEGATIVE MG/DL
RBC URINE: ABNORMAL /HPF (ref 0–3)
SPECIFIC GRAVITY UA: <1.005 (ref 1–1.03)
TRICHOMONAS: ABNORMAL /HPF
UROBILINOGEN, URINE: 0.2 MG/DL (ref 0.2–1)
WBC UA: ABNORMAL /HPF (ref 0–2)

## 2022-08-09 LAB
CULTURE: NORMAL
Lab: NORMAL
SPECIMEN: NORMAL

## 2022-09-06 ENCOUNTER — HOSPITAL ENCOUNTER (OUTPATIENT)
Age: 77
Setting detail: SPECIMEN
Discharge: HOME | End: 2022-09-06
Payer: COMMERCIAL

## 2022-09-06 LAB
ESTIMATED AVERAGE GLUCOSE: 114 MG/DL
HBA1C MFR BLD: 5.6 % (ref 4.2–6.3)

## 2022-09-06 PROCEDURE — 36415 COLL VENOUS BLD VENIPUNCTURE: CPT

## 2022-09-06 PROCEDURE — 83036 HEMOGLOBIN GLYCOSYLATED A1C: CPT

## 2022-12-04 ENCOUNTER — HOSPITAL ENCOUNTER (OUTPATIENT)
Age: 77
Setting detail: SPECIMEN
Discharge: HOME | End: 2022-12-04
Payer: MEDICARE

## 2022-12-04 PROCEDURE — 87086 URINE CULTURE/COLONY COUNT: CPT

## 2022-12-04 PROCEDURE — 87186 SC STD MICRODIL/AGAR DIL: CPT

## 2022-12-04 PROCEDURE — 81001 URINALYSIS AUTO W/SCOPE: CPT

## 2022-12-04 PROCEDURE — 87077 CULTURE AEROBIC IDENTIFY: CPT

## 2022-12-05 ENCOUNTER — HOSPITAL ENCOUNTER (OUTPATIENT)
Age: 77
Setting detail: SPECIMEN
Discharge: HOME | End: 2022-12-05
Payer: MEDICARE

## 2022-12-05 LAB
ANION GAP SERPL CALCULATED.3IONS-SCNC: 14 MMOL/L (ref 4–16)
BACTERIA: NEGATIVE /HPF
BILIRUBIN URINE: NEGATIVE MG/DL
BLOOD, URINE: ABNORMAL
BUN BLDV-MCNC: 7 MG/DL (ref 6–23)
CALCIUM SERPL-MCNC: 9 MG/DL (ref 8.3–10.6)
CHLORIDE BLD-SCNC: 101 MMOL/L (ref 99–110)
CLARITY: ABNORMAL
CO2: 21 MMOL/L (ref 21–32)
COLOR: YELLOW
CREAT SERPL-MCNC: 0.4 MG/DL (ref 0.9–1.3)
ESTIMATED AVERAGE GLUCOSE: 111 MG/DL
GFR SERPL CREATININE-BSD FRML MDRD: >60 ML/MIN/1.73M2
GLUCOSE BLD-MCNC: 144 MG/DL (ref 70–99)
GLUCOSE, URINE: 250 MG/DL
HBA1C MFR BLD: 5.5 % (ref 4.2–6.3)
HCT VFR BLD CALC: 32.1 % (ref 42–52)
HEMOGLOBIN: 12 GM/DL (ref 13.5–18)
KETONES, URINE: NEGATIVE MG/DL
LEUKOCYTE ESTERASE, URINE: ABNORMAL
MCH RBC QN AUTO: 41.1 PG (ref 27–31)
MCHC RBC AUTO-ENTMCNC: 37.4 % (ref 32–36)
MCV RBC AUTO: 109.9 FL (ref 78–100)
MUCUS: ABNORMAL HPF
NITRITE URINE, QUANTITATIVE: NEGATIVE
PDW BLD-RTO: 18.1 % (ref 11.7–14.9)
PH, URINE: 9 (ref 5–8)
PLATELET # BLD: 246 K/CU MM (ref 140–440)
PMV BLD AUTO: 10.5 FL (ref 7.5–11.1)
POTASSIUM SERPL-SCNC: 4.2 MMOL/L (ref 3.5–5.1)
PROTEIN UA: 100 MG/DL
RBC # BLD: 2.92 M/CU MM (ref 4.6–6.2)
RBC URINE: 1 /HPF (ref 0–3)
SODIUM BLD-SCNC: 136 MMOL/L (ref 135–145)
SPECIFIC GRAVITY UA: <1.005 (ref 1–1.03)
TRICHOMONAS: ABNORMAL /HPF
UROBILINOGEN, URINE: 2 MG/DL (ref 0.2–1)
WBC # BLD: 5.4 K/CU MM (ref 4–10.5)
WBC UA: ABNORMAL /HPF (ref 0–2)

## 2022-12-05 PROCEDURE — 85027 COMPLETE CBC AUTOMATED: CPT

## 2022-12-05 PROCEDURE — 80048 BASIC METABOLIC PNL TOTAL CA: CPT

## 2022-12-05 PROCEDURE — 36415 COLL VENOUS BLD VENIPUNCTURE: CPT

## 2022-12-05 PROCEDURE — 83036 HEMOGLOBIN GLYCOSYLATED A1C: CPT

## 2022-12-06 ENCOUNTER — HOSPITAL ENCOUNTER (OUTPATIENT)
Age: 77
Setting detail: SPECIMEN
Discharge: HOME | End: 2022-12-06
Payer: MEDICARE

## 2022-12-06 LAB
ANION GAP SERPL CALCULATED.3IONS-SCNC: 12 MMOL/L (ref 4–16)
BUN BLDV-MCNC: 10 MG/DL (ref 6–23)
CALCIUM SERPL-MCNC: 8.8 MG/DL (ref 8.3–10.6)
CHLORIDE BLD-SCNC: 101 MMOL/L (ref 99–110)
CO2: 25 MMOL/L (ref 21–32)
CREAT SERPL-MCNC: 0.4 MG/DL (ref 0.9–1.3)
CULTURE: ABNORMAL
GFR SERPL CREATININE-BSD FRML MDRD: >60 ML/MIN/1.73M2
GLUCOSE BLD-MCNC: 137 MG/DL (ref 70–99)
Lab: ABNORMAL
POTASSIUM SERPL-SCNC: 4.1 MMOL/L (ref 3.5–5.1)
SODIUM BLD-SCNC: 138 MMOL/L (ref 135–145)
SPECIMEN: ABNORMAL

## 2022-12-06 PROCEDURE — 36415 COLL VENOUS BLD VENIPUNCTURE: CPT

## 2022-12-06 PROCEDURE — 80048 BASIC METABOLIC PNL TOTAL CA: CPT

## 2022-12-26 ENCOUNTER — HOSPITAL ENCOUNTER (OUTPATIENT)
Age: 77
Setting detail: SPECIMEN
Discharge: HOME | End: 2022-12-26
Payer: MEDICARE

## 2022-12-26 PROCEDURE — 87077 CULTURE AEROBIC IDENTIFY: CPT

## 2022-12-26 PROCEDURE — 87186 SC STD MICRODIL/AGAR DIL: CPT

## 2022-12-26 PROCEDURE — 87086 URINE CULTURE/COLONY COUNT: CPT

## 2022-12-26 PROCEDURE — 81001 URINALYSIS AUTO W/SCOPE: CPT

## 2022-12-27 ENCOUNTER — HOSPITAL ENCOUNTER (OUTPATIENT)
Age: 77
Setting detail: SPECIMEN
Discharge: HOME | End: 2022-12-27
Payer: MEDICARE

## 2022-12-27 LAB
ALBUMIN SERPL-MCNC: 4.4 GM/DL (ref 3.4–5)
ALP BLD-CCNC: 80 IU/L (ref 40–128)
ALT SERPL-CCNC: 23 U/L (ref 10–40)
ANION GAP SERPL CALCULATED.3IONS-SCNC: 13 MMOL/L (ref 4–16)
AST SERPL-CCNC: 20 IU/L (ref 15–37)
BACTERIA: ABNORMAL /HPF
BILIRUB SERPL-MCNC: 1.5 MG/DL (ref 0–1)
BILIRUBIN URINE: NEGATIVE MG/DL
BLOOD, URINE: ABNORMAL
BUN BLDV-MCNC: 12 MG/DL (ref 6–23)
CALCIUM SERPL-MCNC: 9.5 MG/DL (ref 8.3–10.6)
CHLORIDE BLD-SCNC: 100 MMOL/L (ref 99–110)
CLARITY: ABNORMAL
CO2: 27 MMOL/L (ref 21–32)
COLOR: YELLOW
CREAT SERPL-MCNC: 0.5 MG/DL (ref 0.9–1.3)
GFR SERPL CREATININE-BSD FRML MDRD: >60 ML/MIN/1.73M2
GLUCOSE BLD-MCNC: 148 MG/DL (ref 70–99)
GLUCOSE, URINE: NEGATIVE MG/DL
HCT VFR BLD CALC: 43.4 % (ref 42–52)
HEMOGLOBIN: 13.2 GM/DL (ref 13.5–18)
KETONES, URINE: NEGATIVE MG/DL
LEUKOCYTE ESTERASE, URINE: ABNORMAL
MCH RBC QN AUTO: 31.7 PG (ref 27–31)
MCHC RBC AUTO-ENTMCNC: 30.4 % (ref 32–36)
MCV RBC AUTO: 104.1 FL (ref 78–100)
MUCUS: ABNORMAL HPF
NITRITE URINE, QUANTITATIVE: NEGATIVE
PDW BLD-RTO: 14.3 % (ref 11.7–14.9)
PH, URINE: 6 (ref 5–8)
PLATELET # BLD: 241 K/CU MM (ref 140–440)
PMV BLD AUTO: 10.7 FL (ref 7.5–11.1)
POTASSIUM SERPL-SCNC: 4.1 MMOL/L (ref 3.5–5.1)
PROTEIN UA: NEGATIVE MG/DL
RBC # BLD: 4.17 M/CU MM (ref 4.6–6.2)
RBC URINE: 2 /HPF (ref 0–3)
SODIUM BLD-SCNC: 140 MMOL/L (ref 135–145)
SPECIFIC GRAVITY UA: 1.02 (ref 1–1.03)
TOTAL PROTEIN: 7.2 GM/DL (ref 6.4–8.2)
TRICHOMONAS: ABNORMAL /HPF
TSH HIGH SENSITIVITY: 1.13 UIU/ML (ref 0.27–4.2)
UROBILINOGEN, URINE: 2 MG/DL (ref 0.2–1)
WBC # BLD: 7.3 K/CU MM (ref 4–10.5)
WBC CLUMP: ABNORMAL /HPF
WBC UA: 275 /HPF (ref 0–2)

## 2022-12-27 PROCEDURE — 84443 ASSAY THYROID STIM HORMONE: CPT

## 2022-12-27 PROCEDURE — 85027 COMPLETE CBC AUTOMATED: CPT

## 2022-12-27 PROCEDURE — 36415 COLL VENOUS BLD VENIPUNCTURE: CPT

## 2022-12-27 PROCEDURE — 80053 COMPREHEN METABOLIC PANEL: CPT

## 2022-12-29 LAB
CULTURE: ABNORMAL
CULTURE: ABNORMAL
Lab: ABNORMAL
SPECIMEN: ABNORMAL

## 2023-01-03 ENCOUNTER — HOSPITAL ENCOUNTER (OUTPATIENT)
Age: 78
Setting detail: SPECIMEN
Discharge: HOME | End: 2023-01-03
Payer: MEDICARE

## 2023-01-03 LAB
ALBUMIN SERPL-MCNC: 4.3 GM/DL (ref 3.4–5)
ALP BLD-CCNC: 81 IU/L (ref 40–128)
ALT SERPL-CCNC: 17 U/L (ref 10–40)
ANION GAP SERPL CALCULATED.3IONS-SCNC: 13 MMOL/L (ref 4–16)
AST SERPL-CCNC: 15 IU/L (ref 15–37)
BILIRUB SERPL-MCNC: 1.1 MG/DL (ref 0–1)
BUN BLDV-MCNC: 9 MG/DL (ref 6–23)
CALCIUM SERPL-MCNC: 9 MG/DL (ref 8.3–10.6)
CHLORIDE BLD-SCNC: 98 MMOL/L (ref 99–110)
CO2: 25 MMOL/L (ref 21–32)
CREAT SERPL-MCNC: 0.4 MG/DL (ref 0.9–1.3)
GFR SERPL CREATININE-BSD FRML MDRD: >60 ML/MIN/1.73M2
GLUCOSE BLD-MCNC: 158 MG/DL (ref 70–99)
HCT VFR BLD CALC: 39 % (ref 42–52)
HEMOGLOBIN: 12.7 GM/DL (ref 13.5–18)
MCH RBC QN AUTO: 32.2 PG (ref 27–31)
MCHC RBC AUTO-ENTMCNC: 32.6 % (ref 32–36)
MCV RBC AUTO: 99 FL (ref 78–100)
PDW BLD-RTO: 13.5 % (ref 11.7–14.9)
PLATELET # BLD: 262 K/CU MM (ref 140–440)
PMV BLD AUTO: 10.8 FL (ref 7.5–11.1)
POTASSIUM SERPL-SCNC: 4.1 MMOL/L (ref 3.5–5.1)
RBC # BLD: 3.94 M/CU MM (ref 4.6–6.2)
SODIUM BLD-SCNC: 136 MMOL/L (ref 135–145)
TOTAL PROTEIN: 6.7 GM/DL (ref 6.4–8.2)
WBC # BLD: 6.7 K/CU MM (ref 4–10.5)

## 2023-01-03 PROCEDURE — 85027 COMPLETE CBC AUTOMATED: CPT

## 2023-01-03 PROCEDURE — 80053 COMPREHEN METABOLIC PANEL: CPT

## 2023-01-03 PROCEDURE — 36415 COLL VENOUS BLD VENIPUNCTURE: CPT

## 2023-02-07 ENCOUNTER — HOSPITAL ENCOUNTER (OUTPATIENT)
Age: 78
Setting detail: SPECIMEN
Discharge: HOME | End: 2023-02-07
Payer: MEDICARE

## 2023-02-07 LAB
CHOLEST SERPL-MCNC: 127 MG/DL
HDLC SERPL-MCNC: 41 MG/DL
LDLC SERPL CALC-MCNC: 62 MG/DL
TRIGL SERPL-MCNC: 122 MG/DL

## 2023-02-07 PROCEDURE — 36415 COLL VENOUS BLD VENIPUNCTURE: CPT

## 2023-02-07 PROCEDURE — 80061 LIPID PANEL: CPT

## 2023-03-07 ENCOUNTER — HOSPITAL ENCOUNTER (OUTPATIENT)
Age: 78
Setting detail: SPECIMEN
Discharge: HOME | End: 2023-03-07
Payer: MEDICARE

## 2023-03-07 LAB
ESTIMATED AVERAGE GLUCOSE: 105 MG/DL
HBA1C MFR BLD: 5.3 % (ref 4.2–6.3)

## 2023-03-07 PROCEDURE — 83036 HEMOGLOBIN GLYCOSYLATED A1C: CPT

## 2023-03-07 PROCEDURE — 36415 COLL VENOUS BLD VENIPUNCTURE: CPT

## 2023-06-06 ENCOUNTER — HOSPITAL ENCOUNTER (OUTPATIENT)
Age: 78
Setting detail: SPECIMEN
Discharge: HOME | End: 2023-06-06
Payer: MEDICARE

## 2023-06-06 LAB
ESTIMATED AVERAGE GLUCOSE: 111 MG/DL
HBA1C MFR BLD: 5.5 % (ref 4.2–6.3)

## 2023-06-06 PROCEDURE — 83036 HEMOGLOBIN GLYCOSYLATED A1C: CPT

## 2023-07-06 ENCOUNTER — HOSPITAL ENCOUNTER (OUTPATIENT)
Age: 78
Setting detail: SPECIMEN
Discharge: HOME | End: 2023-07-06
Payer: MEDICARE

## 2023-07-06 LAB
ALBUMIN SERPL-MCNC: 3.8 GM/DL (ref 3.4–5)
ALP BLD-CCNC: 78 IU/L (ref 40–128)
ALT SERPL-CCNC: 18 U/L (ref 10–40)
ANION GAP SERPL CALCULATED.3IONS-SCNC: 13 MMOL/L (ref 4–16)
AST SERPL-CCNC: 19 IU/L (ref 15–37)
BILIRUB SERPL-MCNC: 1.2 MG/DL (ref 0–1)
BUN SERPL-MCNC: 9 MG/DL (ref 6–23)
CALCIUM SERPL-MCNC: 8.8 MG/DL (ref 8.3–10.6)
CHLORIDE BLD-SCNC: 100 MMOL/L (ref 99–110)
CO2: 24 MMOL/L (ref 21–32)
CREAT SERPL-MCNC: 0.4 MG/DL (ref 0.9–1.3)
GFR SERPL CREATININE-BSD FRML MDRD: >60 ML/MIN/1.73M2
GLUCOSE SERPL-MCNC: 119 MG/DL (ref 70–99)
HCT VFR BLD CALC: 39.2 % (ref 42–52)
HEMOGLOBIN: 12.2 GM/DL (ref 13.5–18)
MCH RBC QN AUTO: 32.2 PG (ref 27–31)
MCHC RBC AUTO-ENTMCNC: 31.1 % (ref 32–36)
MCV RBC AUTO: 103.4 FL (ref 78–100)
PDW BLD-RTO: 14 % (ref 11.7–14.9)
PLATELET # BLD: 225 K/CU MM (ref 140–440)
PMV BLD AUTO: 11.2 FL (ref 7.5–11.1)
POTASSIUM SERPL-SCNC: 3.9 MMOL/L (ref 3.5–5.1)
RBC # BLD: 3.79 M/CU MM (ref 4.6–6.2)
SODIUM BLD-SCNC: 137 MMOL/L (ref 135–145)
TOTAL PROTEIN: 6.3 GM/DL (ref 6.4–8.2)
WBC # BLD: 5.4 K/CU MM (ref 4–10.5)

## 2023-07-06 PROCEDURE — 80053 COMPREHEN METABOLIC PANEL: CPT

## 2023-07-06 PROCEDURE — 36415 COLL VENOUS BLD VENIPUNCTURE: CPT

## 2023-07-06 PROCEDURE — 85027 COMPLETE CBC AUTOMATED: CPT

## 2023-07-21 ENCOUNTER — HOSPITAL ENCOUNTER (OUTPATIENT)
Age: 78
Setting detail: SPECIMEN
Discharge: HOME | End: 2023-07-21
Payer: MEDICARE

## 2023-07-21 ENCOUNTER — HOSPITAL ENCOUNTER (OUTPATIENT)
Age: 78
Setting detail: SPECIMEN
Discharge: HOME | End: 2023-07-21

## 2023-07-21 LAB
ANION GAP SERPL CALCULATED.3IONS-SCNC: 11 MMOL/L (ref 4–16)
BUN SERPL-MCNC: 13 MG/DL (ref 6–23)
CALCIUM SERPL-MCNC: 8.9 MG/DL (ref 8.3–10.6)
CHLORIDE BLD-SCNC: 101 MMOL/L (ref 99–110)
CO2: 25 MMOL/L (ref 21–32)
CREAT SERPL-MCNC: 0.6 MG/DL (ref 0.9–1.3)
GFR SERPL CREATININE-BSD FRML MDRD: >60 ML/MIN/1.73M2
GLUCOSE SERPL-MCNC: 222 MG/DL (ref 70–99)
HCT VFR BLD CALC: 38.6 % (ref 42–52)
HEMOGLOBIN: 12.1 GM/DL (ref 13.5–18)
MCH RBC QN AUTO: 31.8 PG (ref 27–31)
MCHC RBC AUTO-ENTMCNC: 31.3 % (ref 32–36)
MCV RBC AUTO: 101.6 FL (ref 78–100)
PDW BLD-RTO: 13.2 % (ref 11.7–14.9)
PLATELET # BLD: 201 K/CU MM (ref 140–440)
PMV BLD AUTO: 10.6 FL (ref 7.5–11.1)
POTASSIUM SERPL-SCNC: 3.9 MMOL/L (ref 3.5–5.1)
RBC # BLD: 3.8 M/CU MM (ref 4.6–6.2)
SODIUM BLD-SCNC: 137 MMOL/L (ref 135–145)
WBC # BLD: 5.4 K/CU MM (ref 4–10.5)

## 2023-07-21 PROCEDURE — 85027 COMPLETE CBC AUTOMATED: CPT

## 2023-07-21 PROCEDURE — 36415 COLL VENOUS BLD VENIPUNCTURE: CPT

## 2023-07-21 PROCEDURE — 80048 BASIC METABOLIC PNL TOTAL CA: CPT

## 2023-07-21 PROCEDURE — 9900360100 HC STAT COLLECTION FEE SNF

## 2023-07-22 ENCOUNTER — HOSPITAL ENCOUNTER (OUTPATIENT)
Age: 78
Setting detail: SPECIMEN
Discharge: HOME | End: 2023-07-22
Payer: MEDICARE

## 2023-07-22 PROCEDURE — 87186 SC STD MICRODIL/AGAR DIL: CPT

## 2023-07-22 PROCEDURE — 87077 CULTURE AEROBIC IDENTIFY: CPT

## 2023-07-22 PROCEDURE — 87086 URINE CULTURE/COLONY COUNT: CPT

## 2023-07-22 PROCEDURE — 81001 URINALYSIS AUTO W/SCOPE: CPT

## 2023-07-23 ENCOUNTER — HOSPITAL ENCOUNTER (OUTPATIENT)
Age: 78
Setting detail: SPECIMEN
Discharge: HOME | End: 2023-07-23
Payer: MEDICARE

## 2023-07-23 PROCEDURE — 81001 URINALYSIS AUTO W/SCOPE: CPT

## 2023-07-23 PROCEDURE — 87086 URINE CULTURE/COLONY COUNT: CPT

## 2023-07-24 LAB
BACTERIA: ABNORMAL /HPF
BILIRUBIN URINE: NEGATIVE MG/DL
BLOOD, URINE: ABNORMAL
CALCIUM OXALATE CRYSTALS: ABNORMAL /HPF
CLARITY: CLEAR
COLOR: YELLOW
CULTURE: ABNORMAL
GLUCOSE, URINE: 500 MG/DL
KETONES, URINE: NEGATIVE MG/DL
LEUKOCYTE ESTERASE, URINE: ABNORMAL
Lab: ABNORMAL
Lab: ABNORMAL
MUCUS: ABNORMAL HPF
NITRITE URINE, QUANTITATIVE: POSITIVE
PH, URINE: 5.5 (ref 5–8)
PROTEIN UA: NEGATIVE MG/DL
RBC URINE: 0 /HPF (ref 0–3)
SPECIFIC GRAVITY UA: 1.02 (ref 1–1.03)
SPECIMEN: ABNORMAL
SPECIMEN: ABNORMAL
TRICHOMONAS: ABNORMAL /HPF
UROBILINOGEN, URINE: 2 MG/DL (ref 0.2–1)
WBC UA: <1 /HPF (ref 0–2)

## 2023-09-01 ENCOUNTER — HOSPITAL ENCOUNTER (OUTPATIENT)
Age: 78
Setting detail: SPECIMEN
Discharge: HOME | End: 2023-09-01
Payer: MEDICARE

## 2023-09-01 LAB
ESTIMATED AVERAGE GLUCOSE: 120 MG/DL
HBA1C MFR BLD: 5.8 % (ref 4.2–6.3)

## 2023-09-01 PROCEDURE — 83036 HEMOGLOBIN GLYCOSYLATED A1C: CPT

## 2023-09-01 PROCEDURE — 36415 COLL VENOUS BLD VENIPUNCTURE: CPT

## 2023-09-06 NOTE — PROGRESS NOTES
Stefan 44 Progress Note      Name:  Maru Westfall. Frances /Age/Sex: 1945  (75 y.o. male)   MRN & CSN:  [id number] & [id number] Admission Date/Time: 2020  6:39 PM   Location:  1111/1111-A PCP: Tom Pierce Tan[place]on 57 Day: 3    Assessment and Plan:   [facility] L[facility] is a 75 y.o.  male  who presents with jaundice    Recurrent hyperbilirubinemia     Likely from recurrent CBD stone, Previous incident was in January T-Bili as high as 10  MRCP confirms filling defects in CBD with mild CBD dilation   GI following and Surgery consulted   Plan for possible ERCP and Lap-colin      Hypertension - controled with amlodipine,     Hyperlipidemia - on Lipitor     Hx of PE S/p IVC filter placement     Continue with Coumadin per pharmacy     S/p BKA     Incidental pulmonary nodule on CT abdomen - check CT chest     Diet DIET FULL LIQUID;   DVT Prophylaxis [] Lovenox, []  Heparin, [] SCDs, []No VTE prophylaxis, patient ambulating   GI Prophylaxis [] PPI, [] H2 Blocker, [] No GI prophylaxis, patient is receiving diet/Tube Feeds   Code Status DNR-CCA   Disposition Patient requires continued admission due to Jaundice   MDM [] Low, [x] Moderate,[]  High     History of Present Illness: Subjective     Patient Seen & Examined at the bedside      Patient is resting in bed with no distress - minimal abdominal pain - no fever     Ten point ROS reviewed negative, unless as noted above    Objective: Intake/Output Summary (Last 24 hours) at 2020 1035  Last data filed at 2020 0958  Gross per 24 hour   Intake 740 ml   Output 1100 ml   Net -360 ml      Vitals:   Vitals:    20 0748   BP:    Pulse:    Resp: 17   Temp:    SpO2: 94%     Physical Exam:    GEN Awake male, resting in bed in no apparent distress. Appears given age. HENT Icteric sclera   RESP Clear to auscultation, no wheezes, rales or rhonchi. CARDIO/VASC -S1/S2 auscultated.  Regular rate without appreciable murmurs, rubs, No Yes

## 2023-12-06 ENCOUNTER — HOSPITAL ENCOUNTER (OUTPATIENT)
Age: 78
Setting detail: SPECIMEN
Discharge: HOME | End: 2023-12-06
Payer: MEDICARE

## 2023-12-06 LAB
ESTIMATED AVERAGE GLUCOSE: 123 MG/DL
HBA1C MFR BLD: 5.9 % (ref 4.2–6.3)

## 2023-12-06 PROCEDURE — 36415 COLL VENOUS BLD VENIPUNCTURE: CPT

## 2023-12-06 PROCEDURE — 83036 HEMOGLOBIN GLYCOSYLATED A1C: CPT

## 2024-01-02 ENCOUNTER — HOSPITAL ENCOUNTER (OUTPATIENT)
Age: 79
Setting detail: SPECIMEN
Discharge: HOME | End: 2024-01-02
Payer: MEDICARE

## 2024-01-02 LAB
ALBUMIN SERPL-MCNC: 4.2 GM/DL (ref 3.4–5)
ALP BLD-CCNC: 79 IU/L (ref 40–128)
ALT SERPL-CCNC: 18 U/L (ref 10–40)
ANION GAP SERPL CALCULATED.3IONS-SCNC: 17 MMOL/L (ref 7–16)
AST SERPL-CCNC: 27 IU/L (ref 15–37)
BILIRUB SERPL-MCNC: 0.8 MG/DL (ref 0–1)
BUN SERPL-MCNC: 13 MG/DL (ref 6–23)
CALCIUM SERPL-MCNC: 8.8 MG/DL (ref 8.3–10.6)
CHLORIDE BLD-SCNC: 100 MMOL/L (ref 99–110)
CO2: 22 MMOL/L (ref 21–32)
CREAT SERPL-MCNC: 0.4 MG/DL (ref 0.9–1.3)
GFR SERPL CREATININE-BSD FRML MDRD: >60 ML/MIN/1.73M2
GLUCOSE SERPL-MCNC: 136 MG/DL (ref 70–99)
HCT VFR BLD CALC: 41 % (ref 42–52)
HEMOGLOBIN: 12.9 GM/DL (ref 13.5–18)
MCH RBC QN AUTO: 31.1 PG (ref 27–31)
MCHC RBC AUTO-ENTMCNC: 31.5 % (ref 32–36)
MCV RBC AUTO: 98.8 FL (ref 78–100)
PDW BLD-RTO: 13.4 % (ref 11.7–14.9)
PLATELET # BLD: 194 K/CU MM (ref 140–440)
PMV BLD AUTO: 12.4 FL (ref 7.5–11.1)
POTASSIUM SERPL-SCNC: 4.4 MMOL/L (ref 3.5–5.1)
RBC # BLD: 4.15 M/CU MM (ref 4.6–6.2)
SODIUM BLD-SCNC: 139 MMOL/L (ref 135–145)
TOTAL PROTEIN: 6.7 GM/DL (ref 6.4–8.2)
WBC # BLD: 6.2 K/CU MM (ref 4–10.5)

## 2024-01-02 PROCEDURE — 80053 COMPREHEN METABOLIC PANEL: CPT

## 2024-01-02 PROCEDURE — 85027 COMPLETE CBC AUTOMATED: CPT

## 2024-01-02 PROCEDURE — 36415 COLL VENOUS BLD VENIPUNCTURE: CPT

## 2024-02-05 ENCOUNTER — HOSPITAL ENCOUNTER (OUTPATIENT)
Age: 79
Setting detail: SPECIMEN
Discharge: HOME | End: 2024-02-05
Payer: MEDICARE

## 2024-02-05 LAB
CHOLEST SERPL-MCNC: 125 MG/DL
HDLC SERPL-MCNC: 44 MG/DL
LDLC SERPL CALC-MCNC: 61 MG/DL
TRIGL SERPL-MCNC: 102 MG/DL

## 2024-02-05 PROCEDURE — 80061 LIPID PANEL: CPT

## 2024-02-05 PROCEDURE — 36415 COLL VENOUS BLD VENIPUNCTURE: CPT

## 2024-03-04 ENCOUNTER — HOSPITAL ENCOUNTER (OUTPATIENT)
Age: 79
Setting detail: SPECIMEN
Discharge: HOME | End: 2024-03-04
Payer: MEDICARE

## 2024-03-04 LAB
ESTIMATED AVERAGE GLUCOSE: 126 MG/DL
HBA1C MFR BLD: 6 % (ref 4.2–6.3)

## 2024-03-04 PROCEDURE — 83036 HEMOGLOBIN GLYCOSYLATED A1C: CPT

## 2024-03-04 PROCEDURE — 36415 COLL VENOUS BLD VENIPUNCTURE: CPT

## 2024-04-03 ENCOUNTER — HOSPITAL ENCOUNTER (OUTPATIENT)
Age: 79
Setting detail: SPECIMEN
Discharge: HOME | End: 2024-04-03
Payer: MEDICARE

## 2024-04-03 LAB
ESTIMATED AVERAGE GLUCOSE: 120 MG/DL
HBA1C MFR BLD: 5.8 % (ref 4.2–6.3)
T3 FREE: 2.5 PG/ML (ref 2.3–4.2)
T4 FREE SERPL-MCNC: 1.07 NG/DL (ref 0.9–1.8)
TSH SERPL DL<=0.005 MIU/L-ACNC: 1.07 UIU/ML (ref 0.27–4.2)

## 2024-04-03 PROCEDURE — 84481 FREE ASSAY (FT-3): CPT

## 2024-04-03 PROCEDURE — 84439 ASSAY OF FREE THYROXINE: CPT

## 2024-04-03 PROCEDURE — 84443 ASSAY THYROID STIM HORMONE: CPT

## 2024-04-03 PROCEDURE — 36415 COLL VENOUS BLD VENIPUNCTURE: CPT

## 2024-04-03 PROCEDURE — 83036 HEMOGLOBIN GLYCOSYLATED A1C: CPT

## 2024-06-03 ENCOUNTER — HOSPITAL ENCOUNTER (OUTPATIENT)
Age: 79
Setting detail: SPECIMEN
Discharge: HOME | End: 2024-06-03
Payer: MEDICARE

## 2024-06-03 LAB
ANION GAP SERPL CALCULATED.3IONS-SCNC: 12 MMOL/L (ref 7–16)
BUN SERPL-MCNC: 11 MG/DL (ref 6–23)
CALCIUM SERPL-MCNC: 9.2 MG/DL (ref 8.3–10.6)
CHLORIDE BLD-SCNC: 99 MMOL/L (ref 99–110)
CO2: 30 MMOL/L (ref 21–32)
CREAT SERPL-MCNC: 0.4 MG/DL (ref 0.9–1.3)
ESTIMATED AVERAGE GLUCOSE: 120 MG/DL
GFR, ESTIMATED: >90 ML/MIN/1.73M2
GLUCOSE SERPL-MCNC: 137 MG/DL (ref 70–99)
HBA1C MFR BLD: 5.8 % (ref 4.2–6.3)
HCT VFR BLD CALC: 44.3 % (ref 42–52)
HEMOGLOBIN: 13.8 GM/DL (ref 13.5–18)
MCH RBC QN AUTO: 31.8 PG (ref 27–31)
MCHC RBC AUTO-ENTMCNC: 31.2 % (ref 32–36)
MCV RBC AUTO: 102.1 FL (ref 78–100)
PDW BLD-RTO: 13.8 % (ref 11.7–14.9)
PLATELET # BLD: 211 K/CU MM (ref 140–440)
PMV BLD AUTO: 11.1 FL (ref 7.5–11.1)
POTASSIUM SERPL-SCNC: 4.1 MMOL/L (ref 3.5–5.1)
RBC # BLD: 4.34 M/CU MM (ref 4.6–6.2)
SODIUM BLD-SCNC: 141 MMOL/L (ref 135–145)
WBC # BLD: 5.3 K/CU MM (ref 4–10.5)

## 2024-06-03 PROCEDURE — 83036 HEMOGLOBIN GLYCOSYLATED A1C: CPT

## 2024-06-03 PROCEDURE — 36415 COLL VENOUS BLD VENIPUNCTURE: CPT

## 2024-06-03 PROCEDURE — 85027 COMPLETE CBC AUTOMATED: CPT

## 2024-06-03 PROCEDURE — 80048 BASIC METABOLIC PNL TOTAL CA: CPT

## 2024-07-01 ENCOUNTER — HOSPITAL ENCOUNTER (OUTPATIENT)
Age: 79
Setting detail: SPECIMEN
Discharge: HOME | End: 2024-07-01
Payer: MEDICARE

## 2024-07-01 LAB
ALBUMIN SERPL-MCNC: 4.2 GM/DL (ref 3.4–5)
ALP BLD-CCNC: 89 IU/L (ref 40–129)
ALT SERPL-CCNC: 16 U/L (ref 10–40)
ANION GAP SERPL CALCULATED.3IONS-SCNC: 14 MMOL/L (ref 7–16)
AST SERPL-CCNC: 21 IU/L (ref 15–37)
BILIRUB SERPL-MCNC: 1.3 MG/DL (ref 0–1)
BUN SERPL-MCNC: 10 MG/DL (ref 6–23)
CALCIUM SERPL-MCNC: 9.5 MG/DL (ref 8.3–10.6)
CHLORIDE BLD-SCNC: 101 MMOL/L (ref 99–110)
CO2: 28 MMOL/L (ref 21–32)
CREAT SERPL-MCNC: 0.3 MG/DL (ref 0.9–1.3)
GFR, ESTIMATED: >90 ML/MIN/1.73M2
GLUCOSE SERPL-MCNC: 164 MG/DL (ref 70–99)
HCT VFR BLD CALC: 44.7 % (ref 42–52)
HEMOGLOBIN: 14.2 GM/DL (ref 13.5–18)
MCH RBC QN AUTO: 32.3 PG (ref 27–31)
MCHC RBC AUTO-ENTMCNC: 31.8 % (ref 32–36)
MCV RBC AUTO: 101.8 FL (ref 78–100)
PDW BLD-RTO: 12.9 % (ref 11.7–14.9)
PLATELET # BLD: 211 K/CU MM (ref 140–440)
PMV BLD AUTO: 11.6 FL (ref 7.5–11.1)
POTASSIUM SERPL-SCNC: 4.1 MMOL/L (ref 3.5–5.1)
RBC # BLD: 4.39 M/CU MM (ref 4.6–6.2)
SODIUM BLD-SCNC: 143 MMOL/L (ref 135–145)
TOTAL PROTEIN: 7 GM/DL (ref 6.4–8.2)
WBC # BLD: 6.5 K/CU MM (ref 4–10.5)

## 2024-07-01 PROCEDURE — 80053 COMPREHEN METABOLIC PANEL: CPT

## 2024-07-01 PROCEDURE — 85027 COMPLETE CBC AUTOMATED: CPT

## 2024-07-01 PROCEDURE — 36415 COLL VENOUS BLD VENIPUNCTURE: CPT

## 2024-08-11 ENCOUNTER — HOSPITAL ENCOUNTER (OUTPATIENT)
Age: 79
Setting detail: SPECIMEN
Discharge: HOME | End: 2024-08-11

## 2024-08-11 ENCOUNTER — HOSPITAL ENCOUNTER (OUTPATIENT)
Age: 79
Setting detail: SPECIMEN
Discharge: HOME | End: 2024-08-11
Payer: MEDICARE

## 2024-08-11 LAB
ANION GAP SERPL CALCULATED.3IONS-SCNC: 9 MMOL/L (ref 7–16)
BUN SERPL-MCNC: 11 MG/DL (ref 6–23)
CALCIUM SERPL-MCNC: 9.3 MG/DL (ref 8.3–10.6)
CHLORIDE BLD-SCNC: 98 MMOL/L (ref 99–110)
CO2: 32 MMOL/L (ref 21–32)
CREAT SERPL-MCNC: 0.4 MG/DL (ref 0.9–1.3)
GFR, ESTIMATED: >90 ML/MIN/1.73M2
GLUCOSE SERPL-MCNC: 181 MG/DL (ref 70–99)
HCT VFR BLD CALC: 40 % (ref 42–52)
HEMOGLOBIN: 13.8 GM/DL (ref 13.5–18)
MCH RBC QN AUTO: 37.6 PG (ref 27–31)
MCHC RBC AUTO-ENTMCNC: 34.5 % (ref 32–36)
MCV RBC AUTO: 109 FL (ref 78–100)
PDW BLD-RTO: 14.6 % (ref 11.7–14.9)
PLATELET # BLD: 187 K/CU MM (ref 140–440)
PMV BLD AUTO: 10.4 FL (ref 7.5–11.1)
POTASSIUM SERPL-SCNC: 3.8 MMOL/L (ref 3.5–5.1)
PRO-BNP: 178.1 PG/ML
RBC # BLD: 3.67 M/CU MM (ref 4.6–6.2)
SODIUM BLD-SCNC: 139 MMOL/L (ref 135–145)
WBC # BLD: 5.5 K/CU MM (ref 4–10.5)

## 2024-08-11 PROCEDURE — 36415 COLL VENOUS BLD VENIPUNCTURE: CPT

## 2024-08-11 PROCEDURE — 80048 BASIC METABOLIC PNL TOTAL CA: CPT

## 2024-08-11 PROCEDURE — 9900360100 HC STAT COLLECTION FEE SNF

## 2024-08-11 PROCEDURE — 85027 COMPLETE CBC AUTOMATED: CPT

## 2024-08-11 PROCEDURE — 83880 ASSAY OF NATRIURETIC PEPTIDE: CPT

## 2024-09-03 ENCOUNTER — HOSPITAL ENCOUNTER (OUTPATIENT)
Age: 79
Setting detail: SPECIMEN
Discharge: HOME | End: 2024-09-03
Payer: MEDICARE

## 2024-09-03 LAB
ESTIMATED AVERAGE GLUCOSE: 128 MG/DL
HBA1C MFR BLD: 6.1 % (ref 4.2–6.3)

## 2024-09-03 PROCEDURE — 83036 HEMOGLOBIN GLYCOSYLATED A1C: CPT

## 2024-09-03 PROCEDURE — 36415 COLL VENOUS BLD VENIPUNCTURE: CPT

## 2024-10-14 ENCOUNTER — HOSPITAL ENCOUNTER (OUTPATIENT)
Age: 79
Setting detail: SPECIMEN
Discharge: HOME | End: 2024-10-14
Payer: MEDICARE

## 2024-10-14 LAB
ANION GAP SERPL CALCULATED.3IONS-SCNC: 11 MMOL/L (ref 9–17)
BUN SERPL-MCNC: 9 MG/DL (ref 7–20)
CALCIUM SERPL-MCNC: 9.1 MG/DL (ref 8.3–10.6)
CHLORIDE SERPL-SCNC: 97 MMOL/L (ref 99–110)
CO2 SERPL-SCNC: 32 MMOL/L (ref 21–32)
CREAT SERPL-MCNC: 0.5 MG/DL (ref 0.8–1.3)
ERYTHROCYTE [DISTWIDTH] IN BLOOD BY AUTOMATED COUNT: 13.4 % (ref 11.7–14.9)
GFR, ESTIMATED: >90 ML/MIN/1.73M2
GLUCOSE SERPL-MCNC: 246 MG/DL (ref 74–99)
HCT VFR BLD AUTO: 40.4 % (ref 42–52)
HGB BLD-MCNC: 12.3 G/DL (ref 13.5–18)
MCH RBC QN AUTO: 32.9 PG (ref 27–31)
MCHC RBC AUTO-ENTMCNC: 30.4 G/DL (ref 32–36)
MCV RBC AUTO: 108 FL (ref 78–100)
PLATELET # BLD AUTO: 189 K/UL (ref 140–440)
PMV BLD AUTO: 10.8 FL (ref 7.5–11.1)
POTASSIUM SERPL-SCNC: 4.2 MMOL/L (ref 3.5–5.1)
RBC # BLD AUTO: 3.74 M/UL (ref 4.6–6.2)
SODIUM SERPL-SCNC: 140 MMOL/L (ref 136–145)
WBC OTHER # BLD: 4.7 K/UL (ref 4–10.5)

## 2024-10-14 PROCEDURE — 85027 COMPLETE CBC AUTOMATED: CPT

## 2024-10-14 PROCEDURE — 80048 BASIC METABOLIC PNL TOTAL CA: CPT

## 2024-10-14 PROCEDURE — 36415 COLL VENOUS BLD VENIPUNCTURE: CPT

## 2024-12-02 ENCOUNTER — HOSPITAL ENCOUNTER (OUTPATIENT)
Age: 79
Setting detail: SPECIMEN
Discharge: HOME | End: 2024-12-02
Payer: MEDICAID

## 2024-12-02 LAB
EST. AVERAGE GLUCOSE BLD GHB EST-MCNC: 136 MG/DL
HBA1C MFR BLD: 6.4 % (ref 4.2–6.3)

## 2024-12-02 PROCEDURE — 83036 HEMOGLOBIN GLYCOSYLATED A1C: CPT

## 2024-12-02 PROCEDURE — 36415 COLL VENOUS BLD VENIPUNCTURE: CPT

## 2025-01-02 ENCOUNTER — HOSPITAL ENCOUNTER (OUTPATIENT)
Age: 80
Setting detail: SPECIMEN
Discharge: HOME | End: 2025-01-02
Payer: MEDICAID

## 2025-01-02 LAB
ALBUMIN SERPL-MCNC: 3.9 G/DL (ref 3.4–5)
ALBUMIN/GLOB SERPL: 1.6 {RATIO} (ref 1.1–2.2)
ALP SERPL-CCNC: 81 U/L (ref 40–129)
ALT SERPL-CCNC: 11 U/L (ref 10–40)
ANION GAP SERPL CALCULATED.3IONS-SCNC: 5 MMOL/L (ref 9–17)
AST SERPL-CCNC: 17 U/L (ref 15–37)
BILIRUB SERPL-MCNC: 1.1 MG/DL (ref 0–1)
BUN SERPL-MCNC: 6 MG/DL (ref 7–20)
CALCIUM SERPL-MCNC: 9.2 MG/DL (ref 8.3–10.6)
CHLORIDE SERPL-SCNC: 100 MMOL/L (ref 99–110)
CO2 SERPL-SCNC: 40 MMOL/L (ref 21–32)
CREAT SERPL-MCNC: 0.4 MG/DL (ref 0.8–1.3)
ERYTHROCYTE [DISTWIDTH] IN BLOOD BY AUTOMATED COUNT: 13.2 % (ref 11.7–14.9)
GFR, ESTIMATED: >90 ML/MIN/1.73M2
GLUCOSE SERPL-MCNC: 174 MG/DL (ref 74–99)
HCT VFR BLD AUTO: 39.9 % (ref 42–52)
HGB BLD-MCNC: 12.5 G/DL (ref 13.5–18)
MCH RBC QN AUTO: 33.1 PG (ref 27–31)
MCHC RBC AUTO-ENTMCNC: 31.3 G/DL (ref 32–36)
MCV RBC AUTO: 105.6 FL (ref 78–100)
PLATELET, FLUORESCENCE: 182 K/UL (ref 140–440)
PMV BLD AUTO: 11 FL (ref 7.5–11.1)
POTASSIUM SERPL-SCNC: 4.1 MMOL/L (ref 3.5–5.1)
PROT SERPL-MCNC: 6.4 G/DL (ref 6.4–8.2)
RBC # BLD AUTO: 3.78 M/UL (ref 4.6–6.2)
SODIUM SERPL-SCNC: 144 MMOL/L (ref 136–145)
WBC OTHER # BLD: 4.9 K/UL (ref 4–10.5)

## 2025-01-02 PROCEDURE — 80053 COMPREHEN METABOLIC PANEL: CPT

## 2025-01-02 PROCEDURE — 85027 COMPLETE CBC AUTOMATED: CPT

## 2025-02-03 ENCOUNTER — HOSPITAL ENCOUNTER (OUTPATIENT)
Age: 80
Setting detail: SPECIMEN
Discharge: HOME | End: 2025-02-03
Payer: MEDICAID

## 2025-02-03 LAB
CHOLEST SERPL-MCNC: 130 MG/DL (ref 125–199)
HDLC SERPL-MCNC: 45 MG/DL
LDLC SERPL CALC-MCNC: 58 MG/DL
TRIGL SERPL-MCNC: 134 MG/DL

## 2025-02-03 PROCEDURE — 80061 LIPID PANEL: CPT

## 2025-02-12 ENCOUNTER — APPOINTMENT (OUTPATIENT)
Dept: GENERAL RADIOLOGY | Age: 80
DRG: 189 | End: 2025-02-12
Payer: MEDICARE

## 2025-02-12 ENCOUNTER — APPOINTMENT (OUTPATIENT)
Dept: CT IMAGING | Age: 80
DRG: 189 | End: 2025-02-12
Payer: MEDICARE

## 2025-02-12 ENCOUNTER — HOSPITAL ENCOUNTER (INPATIENT)
Age: 80
LOS: 13 days | Discharge: NURSING, REHAB OR LONG-TERM CARE | DRG: 189 | End: 2025-02-26
Attending: EMERGENCY MEDICINE | Admitting: INTERNAL MEDICINE
Payer: MEDICARE

## 2025-02-12 DIAGNOSIS — R41.82 ALTERED MENTAL STATUS, UNSPECIFIED ALTERED MENTAL STATUS TYPE: Primary | ICD-10-CM

## 2025-02-12 DIAGNOSIS — Z66 DNR (DO NOT RESUSCITATE): ICD-10-CM

## 2025-02-12 DIAGNOSIS — R00.0 TACHYCARDIA: ICD-10-CM

## 2025-02-12 LAB
ALBUMIN SERPL-MCNC: 3.6 G/DL (ref 3.4–5)
ALBUMIN/GLOB SERPL: 1.5 {RATIO} (ref 1.1–2.2)
ALP SERPL-CCNC: 80 U/L (ref 40–129)
ALT SERPL-CCNC: 16 U/L (ref 10–40)
ANION GAP SERPL CALCULATED.3IONS-SCNC: 4 MMOL/L (ref 9–17)
AST SERPL-CCNC: 20 U/L (ref 15–37)
BASOPHILS # BLD: 0.02 K/UL
BASOPHILS NFR BLD: 0 % (ref 0–1)
BILIRUB SERPL-MCNC: 2.5 MG/DL (ref 0–1)
BUN SERPL-MCNC: 11 MG/DL (ref 7–20)
CALCIUM SERPL-MCNC: 9.4 MG/DL (ref 8.3–10.6)
CHLORIDE SERPL-SCNC: 92 MMOL/L (ref 99–110)
CHP ED QC CHECK: NORMAL
CO2 SERPL-SCNC: 43 MMOL/L (ref 21–32)
CREAT SERPL-MCNC: 0.4 MG/DL (ref 0.8–1.3)
EOSINOPHIL # BLD: 0.12 K/UL
EOSINOPHILS RELATIVE PERCENT: 2 % (ref 0–3)
ERYTHROCYTE [DISTWIDTH] IN BLOOD BY AUTOMATED COUNT: 13.3 % (ref 11.7–14.9)
GFR, ESTIMATED: >90 ML/MIN/1.73M2
GLUCOSE BLD-MCNC: 198 MG/DL
GLUCOSE BLD-MCNC: 198 MG/DL (ref 74–99)
GLUCOSE SERPL-MCNC: 201 MG/DL (ref 74–99)
HCT VFR BLD AUTO: 34.7 % (ref 42–52)
HGB BLD-MCNC: 11.3 G/DL (ref 13.5–18)
IMM GRANULOCYTES # BLD AUTO: 0.09 K/UL
IMM GRANULOCYTES NFR BLD: 2 %
INR PPP: 1.3
LYMPHOCYTES NFR BLD: 1.31 K/UL
LYMPHOCYTES RELATIVE PERCENT: 26 % (ref 24–44)
MCH RBC QN AUTO: 35.4 PG (ref 27–31)
MCHC RBC AUTO-ENTMCNC: 32.6 G/DL (ref 32–36)
MCV RBC AUTO: 108.8 FL (ref 78–100)
MONOCYTES NFR BLD: 0.25 K/UL
MONOCYTES NFR BLD: 5 % (ref 0–4)
NEUTROPHILS NFR BLD: 64 % (ref 36–66)
NEUTS SEG NFR BLD: 3.24 K/UL
PLATELET # BLD AUTO: 196 K/UL (ref 140–440)
PMV BLD AUTO: 10.8 FL (ref 7.5–11.1)
POTASSIUM SERPL-SCNC: 3.9 MMOL/L (ref 3.5–5.1)
PROT SERPL-MCNC: 6.1 G/DL (ref 6.4–8.2)
PROTHROMBIN TIME: 16.3 SEC (ref 11.7–14.5)
RBC # BLD AUTO: 3.19 M/UL (ref 4.6–6.2)
SODIUM SERPL-SCNC: 138 MMOL/L (ref 136–145)
TROPONIN I SERPL HS-MCNC: 32 NG/L (ref 0–22)
TSH SERPL DL<=0.05 MIU/L-ACNC: 1.88 UIU/ML (ref 0.27–4.2)
WBC OTHER # BLD: 5 K/UL (ref 4–10.5)

## 2025-02-12 PROCEDURE — 99285 EMERGENCY DEPT VISIT HI MDM: CPT

## 2025-02-12 PROCEDURE — 85610 PROTHROMBIN TIME: CPT

## 2025-02-12 PROCEDURE — 71045 X-RAY EXAM CHEST 1 VIEW: CPT

## 2025-02-12 PROCEDURE — 70498 CT ANGIOGRAPHY NECK: CPT

## 2025-02-12 PROCEDURE — 6360000004 HC RX CONTRAST MEDICATION: Performed by: EMERGENCY MEDICINE

## 2025-02-12 PROCEDURE — 80053 COMPREHEN METABOLIC PANEL: CPT

## 2025-02-12 PROCEDURE — 84443 ASSAY THYROID STIM HORMONE: CPT

## 2025-02-12 PROCEDURE — 82962 GLUCOSE BLOOD TEST: CPT

## 2025-02-12 PROCEDURE — 84484 ASSAY OF TROPONIN QUANT: CPT

## 2025-02-12 PROCEDURE — 70450 CT HEAD/BRAIN W/O DYE: CPT

## 2025-02-12 PROCEDURE — 93005 ELECTROCARDIOGRAM TRACING: CPT | Performed by: EMERGENCY MEDICINE

## 2025-02-12 PROCEDURE — 2580000003 HC RX 258: Performed by: EMERGENCY MEDICINE

## 2025-02-12 PROCEDURE — 85025 COMPLETE CBC W/AUTO DIFF WBC: CPT

## 2025-02-12 RX ORDER — IOPAMIDOL 755 MG/ML
75 INJECTION, SOLUTION INTRAVASCULAR
Status: COMPLETED | OUTPATIENT
Start: 2025-02-12 | End: 2025-02-12

## 2025-02-12 RX ORDER — 0.9 % SODIUM CHLORIDE 0.9 %
1000 INTRAVENOUS SOLUTION INTRAVENOUS ONCE
Status: COMPLETED | OUTPATIENT
Start: 2025-02-12 | End: 2025-02-13

## 2025-02-12 RX ADMIN — IOPAMIDOL 75 ML: 755 INJECTION, SOLUTION INTRAVENOUS at 21:32

## 2025-02-12 RX ADMIN — SODIUM CHLORIDE 1000 ML: 9 INJECTION, SOLUTION INTRAVENOUS at 23:16

## 2025-02-13 PROBLEM — G93.40 ACUTE ENCEPHALOPATHY: Status: ACTIVE | Noted: 2025-02-13

## 2025-02-13 LAB
ANION GAP SERPL CALCULATED.3IONS-SCNC: 7 MMOL/L (ref 9–17)
ARTERIAL PATENCY WRIST A: ABNORMAL
B PARAP IS1001 DNA NPH QL NAA+NON-PROBE: NOT DETECTED
B PERT DNA SPEC QL NAA+PROBE: NOT DETECTED
BNP SERPL-MCNC: 542 PG/ML (ref 0–450)
BODY TEMPERATURE: 37
BUN SERPL-MCNC: 11 MG/DL (ref 7–20)
C PNEUM DNA NPH QL NAA+NON-PROBE: NOT DETECTED
CALCIUM SERPL-MCNC: 9.2 MG/DL (ref 8.3–10.6)
CHLORIDE SERPL-SCNC: 97 MMOL/L (ref 99–110)
CO2 SERPL-SCNC: 38 MMOL/L (ref 21–32)
COHGB MFR BLD: 1.3 % (ref 0.5–1.5)
COHGB MFR BLD: 1.3 % (ref 0.5–1.5)
COHGB MFR BLD: 1.4 % (ref 0.5–1.5)
COHGB MFR BLD: 2.4 % (ref 0.5–1.5)
CREAT SERPL-MCNC: 0.4 MG/DL (ref 0.8–1.3)
EKG DIAGNOSIS: NORMAL
EKG DIAGNOSIS: NORMAL
EKG Q-T INTERVAL: 384 MS
EKG Q-T INTERVAL: 430 MS
EKG QRS DURATION: 60 MS
EKG QRS DURATION: 70 MS
EKG QTC CALCULATION (BAZETT): 426 MS
EKG QTC CALCULATION (BAZETT): 467 MS
EKG R AXIS: -26 DEGREES
EKG R AXIS: -35 DEGREES
EKG T AXIS: -12 DEGREES
EKG T AXIS: -45 DEGREES
EKG VENTRICULAR RATE: 71 BPM
EKG VENTRICULAR RATE: 74 BPM
ERYTHROCYTE [DISTWIDTH] IN BLOOD BY AUTOMATED COUNT: 13.6 % (ref 11.7–14.9)
EST. AVERAGE GLUCOSE BLD GHB EST-MCNC: 130 MG/DL
FLUAV RNA NPH QL NAA+NON-PROBE: NOT DETECTED
FLUBV RNA NPH QL NAA+NON-PROBE: NOT DETECTED
GFR, ESTIMATED: >90 ML/MIN/1.73M2
GLUCOSE BLD-MCNC: 160 MG/DL (ref 74–99)
GLUCOSE BLD-MCNC: 171 MG/DL (ref 74–99)
GLUCOSE SERPL-MCNC: 207 MG/DL (ref 74–99)
HADV DNA NPH QL NAA+NON-PROBE: NOT DETECTED
HBA1C MFR BLD: 6.1 % (ref 4.2–6.3)
HCO3 VENOUS: 37.6 MMOL/L (ref 22–29)
HCO3 VENOUS: 39.8 MMOL/L (ref 22–29)
HCO3 VENOUS: 42.5 MMOL/L (ref 22–29)
HCO3 VENOUS: 44.2 MMOL/L (ref 22–29)
HCOV 229E RNA NPH QL NAA+NON-PROBE: NOT DETECTED
HCOV HKU1 RNA NPH QL NAA+NON-PROBE: NOT DETECTED
HCOV NL63 RNA NPH QL NAA+NON-PROBE: NOT DETECTED
HCOV OC43 RNA NPH QL NAA+NON-PROBE: NOT DETECTED
HCT VFR BLD AUTO: 35.3 % (ref 42–52)
HGB BLD-MCNC: 10.9 G/DL (ref 13.5–18)
HMPV RNA NPH QL NAA+NON-PROBE: NOT DETECTED
HPIV1 RNA NPH QL NAA+NON-PROBE: NOT DETECTED
HPIV2 RNA NPH QL NAA+NON-PROBE: NOT DETECTED
HPIV3 RNA NPH QL NAA+NON-PROBE: NOT DETECTED
HPIV4 RNA NPH QL NAA+NON-PROBE: NOT DETECTED
M PNEUMO DNA NPH QL NAA+NON-PROBE: NOT DETECTED
MCH RBC QN AUTO: 32.4 PG (ref 27–31)
MCHC RBC AUTO-ENTMCNC: 30.9 G/DL (ref 32–36)
MCV RBC AUTO: 105.1 FL (ref 78–100)
METHEMOGLOBIN: 0.3 % (ref 0.5–1.5)
METHEMOGLOBIN: 0.5 % (ref 0.5–1.5)
METHEMOGLOBIN: 0.6 % (ref 0.5–1.5)
METHEMOGLOBIN: 1.6 % (ref 0.5–1.5)
OXYHGB MFR BLD: 31.2 %
OXYHGB MFR BLD: 69.5 %
OXYHGB MFR BLD: 71.8 %
OXYHGB MFR BLD: 90.3 %
PCO2 VENOUS: 101.2 MM HG (ref 38–54)
PCO2 VENOUS: 64.7 MM HG (ref 38–54)
PCO2 VENOUS: 73.3 MM HG (ref 38–54)
PCO2 VENOUS: 86.9 MM HG (ref 38–54)
PH VENOUS: 7.24 (ref 7.32–7.43)
PH VENOUS: 7.32 (ref 7.32–7.43)
PH VENOUS: 7.33 (ref 7.32–7.43)
PH VENOUS: 7.41 (ref 7.32–7.43)
PLATELET # BLD AUTO: 218 K/UL (ref 140–440)
PMV BLD AUTO: 11.5 FL (ref 7.5–11.1)
PO2 VENOUS: 22.1 MM HG (ref 23–48)
PO2 VENOUS: 39.2 MM HG (ref 23–48)
PO2 VENOUS: 43.3 MM HG (ref 23–48)
PO2 VENOUS: 71.1 MM HG (ref 23–48)
POSITIVE BASE EXCESS, VEN: 11.3 MMOL/L (ref 0–3)
POSITIVE BASE EXCESS, VEN: 12.6 MMOL/L (ref 0–3)
POSITIVE BASE EXCESS, VEN: 14.5 MMOL/L (ref 0–3)
POSITIVE BASE EXCESS, VEN: 9 MMOL/L (ref 0–3)
POTASSIUM SERPL-SCNC: 4 MMOL/L (ref 3.5–5.1)
RBC # BLD AUTO: 3.36 M/UL (ref 4.6–6.2)
RSV RNA NPH QL NAA+NON-PROBE: NOT DETECTED
RV+EV RNA NPH QL NAA+NON-PROBE: NOT DETECTED
SARS-COV-2 RNA NPH QL NAA+NON-PROBE: NOT DETECTED
SODIUM SERPL-SCNC: 143 MMOL/L (ref 136–145)
SPECIMEN DESCRIPTION: NORMAL
TEXT FOR RESPIRATORY: ABNORMAL
TROPONIN I SERPL HS-MCNC: 30 NG/L (ref 0–22)
WBC OTHER # BLD: 7.7 K/UL (ref 4–10.5)

## 2025-02-13 PROCEDURE — 94640 AIRWAY INHALATION TREATMENT: CPT

## 2025-02-13 PROCEDURE — 83036 HEMOGLOBIN GLYCOSYLATED A1C: CPT

## 2025-02-13 PROCEDURE — 2700000000 HC OXYGEN THERAPY PER DAY

## 2025-02-13 PROCEDURE — 94761 N-INVAS EAR/PLS OXIMETRY MLT: CPT

## 2025-02-13 PROCEDURE — 83880 ASSAY OF NATRIURETIC PEPTIDE: CPT

## 2025-02-13 PROCEDURE — 82962 GLUCOSE BLOOD TEST: CPT

## 2025-02-13 PROCEDURE — 5A0945A ASSISTANCE WITH RESPIRATORY VENTILATION, 24-96 CONSECUTIVE HOURS, HIGH NASAL FLOW/VELOCITY: ICD-10-PCS | Performed by: INTERNAL MEDICINE

## 2025-02-13 PROCEDURE — 80048 BASIC METABOLIC PNL TOTAL CA: CPT

## 2025-02-13 PROCEDURE — 93005 ELECTROCARDIOGRAM TRACING: CPT | Performed by: INTERNAL MEDICINE

## 2025-02-13 PROCEDURE — 93010 ELECTROCARDIOGRAM REPORT: CPT | Performed by: INTERNAL MEDICINE

## 2025-02-13 PROCEDURE — 2500000003 HC RX 250 WO HCPCS: Performed by: INTERNAL MEDICINE

## 2025-02-13 PROCEDURE — 5A09457 ASSISTANCE WITH RESPIRATORY VENTILATION, 24-96 CONSECUTIVE HOURS, CONTINUOUS POSITIVE AIRWAY PRESSURE: ICD-10-PCS | Performed by: INTERNAL MEDICINE

## 2025-02-13 PROCEDURE — 6370000000 HC RX 637 (ALT 250 FOR IP): Performed by: INTERNAL MEDICINE

## 2025-02-13 PROCEDURE — 36415 COLL VENOUS BLD VENIPUNCTURE: CPT

## 2025-02-13 PROCEDURE — 82805 BLOOD GASES W/O2 SATURATION: CPT

## 2025-02-13 PROCEDURE — 94660 CPAP INITIATION&MGMT: CPT

## 2025-02-13 PROCEDURE — 85027 COMPLETE CBC AUTOMATED: CPT

## 2025-02-13 PROCEDURE — 84484 ASSAY OF TROPONIN QUANT: CPT

## 2025-02-13 PROCEDURE — 0202U NFCT DS 22 TRGT SARS-COV-2: CPT

## 2025-02-13 PROCEDURE — 2060000000 HC ICU INTERMEDIATE R&B

## 2025-02-13 RX ORDER — BISACODYL 10 MG
10 SUPPOSITORY, RECTAL RECTAL DAILY PRN
COMMUNITY

## 2025-02-13 RX ORDER — GABAPENTIN 100 MG/1
100 CAPSULE ORAL 3 TIMES DAILY
Status: ON HOLD | COMMUNITY
End: 2025-02-26 | Stop reason: HOSPADM

## 2025-02-13 RX ORDER — ONDANSETRON 4 MG/1
4 TABLET, ORALLY DISINTEGRATING ORAL EVERY 8 HOURS PRN
Status: DISCONTINUED | OUTPATIENT
Start: 2025-02-13 | End: 2025-02-26 | Stop reason: HOSPADM

## 2025-02-13 RX ORDER — KETOROLAC TROMETHAMINE 4 MG/ML
1 SOLUTION/ DROPS OPHTHALMIC 4 TIMES DAILY
COMMUNITY

## 2025-02-13 RX ORDER — ASPIRIN 81 MG/1
81 TABLET, CHEWABLE ORAL DAILY
Status: DISCONTINUED | OUTPATIENT
Start: 2025-02-13 | End: 2025-02-26 | Stop reason: HOSPADM

## 2025-02-13 RX ORDER — ATORVASTATIN CALCIUM 40 MG/1
40 TABLET, FILM COATED ORAL NIGHTLY
Status: DISCONTINUED | OUTPATIENT
Start: 2025-02-13 | End: 2025-02-26 | Stop reason: HOSPADM

## 2025-02-13 RX ORDER — MEMANTINE HYDROCHLORIDE 10 MG/1
10 TABLET ORAL 2 TIMES DAILY
COMMUNITY

## 2025-02-13 RX ORDER — SODIUM CHLORIDE 0.9 % (FLUSH) 0.9 %
5-40 SYRINGE (ML) INJECTION EVERY 12 HOURS SCHEDULED
Status: DISCONTINUED | OUTPATIENT
Start: 2025-02-13 | End: 2025-02-26 | Stop reason: HOSPADM

## 2025-02-13 RX ORDER — ONDANSETRON 2 MG/ML
4 INJECTION INTRAMUSCULAR; INTRAVENOUS EVERY 6 HOURS PRN
Status: DISCONTINUED | OUTPATIENT
Start: 2025-02-13 | End: 2025-02-26 | Stop reason: HOSPADM

## 2025-02-13 RX ORDER — IPRATROPIUM BROMIDE AND ALBUTEROL SULFATE 2.5; .5 MG/3ML; MG/3ML
1 SOLUTION RESPIRATORY (INHALATION)
Status: DISCONTINUED | OUTPATIENT
Start: 2025-02-13 | End: 2025-02-16

## 2025-02-13 RX ORDER — SODIUM CHLORIDE 0.9 % (FLUSH) 0.9 %
5-40 SYRINGE (ML) INJECTION PRN
Status: DISCONTINUED | OUTPATIENT
Start: 2025-02-13 | End: 2025-02-26 | Stop reason: HOSPADM

## 2025-02-13 RX ORDER — ALBUTEROL SULFATE 0.83 MG/ML
2.5 SOLUTION RESPIRATORY (INHALATION) 3 TIMES DAILY
COMMUNITY

## 2025-02-13 RX ORDER — INSULIN GLARGINE 100 [IU]/ML
5 INJECTION, SOLUTION SUBCUTANEOUS DAILY
COMMUNITY

## 2025-02-13 RX ORDER — MIRTAZAPINE 15 MG/1
15 TABLET, FILM COATED ORAL NIGHTLY
COMMUNITY

## 2025-02-13 RX ORDER — SODIUM CHLORIDE 9 MG/ML
INJECTION, SOLUTION INTRAVENOUS PRN
Status: DISCONTINUED | OUTPATIENT
Start: 2025-02-13 | End: 2025-02-26 | Stop reason: HOSPADM

## 2025-02-13 RX ORDER — TIMOLOL MALEATE 5 MG/ML
1 SOLUTION/ DROPS OPHTHALMIC DAILY
COMMUNITY

## 2025-02-13 RX ORDER — ASPIRIN 300 MG/1
300 SUPPOSITORY RECTAL DAILY
Status: DISCONTINUED | OUTPATIENT
Start: 2025-02-13 | End: 2025-02-26 | Stop reason: HOSPADM

## 2025-02-13 RX ORDER — SENNA AND DOCUSATE SODIUM 50; 8.6 MG/1; MG/1
2 TABLET, FILM COATED ORAL DAILY
COMMUNITY

## 2025-02-13 RX ORDER — POLYETHYLENE GLYCOL 3350 17 G/17G
17 POWDER, FOR SOLUTION ORAL DAILY PRN
Status: DISCONTINUED | OUTPATIENT
Start: 2025-02-13 | End: 2025-02-26 | Stop reason: HOSPADM

## 2025-02-13 RX ORDER — SERTRALINE HYDROCHLORIDE 25 MG/1
25 TABLET, FILM COATED ORAL DAILY
Status: ON HOLD | COMMUNITY
End: 2025-02-26 | Stop reason: HOSPADM

## 2025-02-13 RX ADMIN — IPRATROPIUM BROMIDE AND ALBUTEROL SULFATE 1 DOSE: .5; 2.5 SOLUTION RESPIRATORY (INHALATION) at 11:19

## 2025-02-13 RX ADMIN — IPRATROPIUM BROMIDE AND ALBUTEROL SULFATE 1 DOSE: .5; 2.5 SOLUTION RESPIRATORY (INHALATION) at 20:32

## 2025-02-13 RX ADMIN — SODIUM CHLORIDE, PRESERVATIVE FREE 10 ML: 5 INJECTION INTRAVENOUS at 21:16

## 2025-02-13 RX ADMIN — IPRATROPIUM BROMIDE AND ALBUTEROL SULFATE 1 DOSE: .5; 2.5 SOLUTION RESPIRATORY (INHALATION) at 16:08

## 2025-02-13 RX ADMIN — IPRATROPIUM BROMIDE AND ALBUTEROL SULFATE 1 DOSE: .5; 2.5 SOLUTION RESPIRATORY (INHALATION) at 07:07

## 2025-02-13 RX ADMIN — ASPIRIN 300 MG: 300 SUPPOSITORY RECTAL at 11:11

## 2025-02-13 NOTE — ED NOTES
This RN contacted Dr. Parks regarding bipap on this patient. Patient has removed bipap multiple times and educated on importance of keeping bipap on.

## 2025-02-13 NOTE — ED PROVIDER NOTES
[facility]    Emergency Department Encounter      Patient: Jose Guadalupe GARSIA  MRN: [id number]  : 1945  Date of Evaluation: 2025  ED Provider:  Jenny Josue DO    Triage Chief Complaint:   No chief complaint on file.    [place]:  Jose Guadalupe GARSIA is a 79 y.o. male who  has a past medical history of Allergic rhinitis, Anxiety, BPH (benign prostatic hyperplasia), CAD (coronary artery disease), Dermatitis, GERD (gastroesophageal reflux disease), Hyperlipidemia, Hypertension, Idiopathic peripheral autonomic neuropathy, Insomnia, Meniere's disease, Osteoarthritis, Polyneuropathy, Pulmonary embolism (HCC), Urinary retention, and Vitamin B12 deficiency anemia. and presents with   Obtunded, normally has dementia but is awake and talking.  Dnr-cca  Bg 226, heart rate fluctuating, may be afib per ems, BP ok    Myrtle steinberg, nursing home told her that he reportedly knocked things out of his window but his bed is 3 feet from the window.  Not sure if he threw something from the window or what.  Two weeks ago they said he fell trying to get out of bed but he has never tried getting out of bed    Reportedly tried getting out of bed  Only has one leg  Doesn't think he tried to get up.  He has been there for 5 years  They have taken very good care of him    She is 6 hours away.    Does not want CPR or a tube in his mouth to breathe.    No pain meds other than tylenol on home med list.    Night nurse practitioner from the facility called at 12:20 and said that she was told he was hypotensive and bradycardic.  She also notes he has Obstructive Sleep Apnea.  He was not hypotensive and bradycardic on arrival.    ROS - see HPI, below listed is current ROS at time of my eval:   systems reviewed and negative except as above.     Past Medical History:   Diagnosis Date    Allergic rhinitis     Anxiety     BPH (benign prostatic hyperplasia)     CAD (coronary artery disease)     Dermatitis     GERD

## 2025-02-13 NOTE — ED NOTES
ED TO INPATIENT SBAR HANDOFF    Patient Name: Jose Guadalupe GARSIA   :  1945  79 y.o.   Preferred Name  Jose Guadalupe  Family/Caregiver Present no   Restraints no   C-SSRS:    Sitter no   Sepsis Risk Score        Situation  No chief complaint on file.    Brief Description of Patient's Condition: Patient is alert, intermittently tries to remove Bipap but will remain.   Mental Status: alert  Arrived from: nursing home    Imaging:   XR CHEST PORTABLE   Final Result      CTA HEAD NECK W CONTRAST   Final Result      CT HEAD WO CONTRAST   Final Result      MRI brain without contrast    (Results Pending)     Abnormal labs:   Abnormal Labs Reviewed   CBC WITH AUTO DIFFERENTIAL - Abnormal; Notable for the following components:       Result Value    RBC 3.19 (*)     Hemoglobin 11.3 (*)     Hematocrit 34.7 (*)     .8 (*)     MCH 35.4 (*)     Monocytes % 5 (*)     Immature Granulocytes % 2 (*)     All other components within normal limits   COMPREHENSIVE METABOLIC PANEL W/ REFLEX TO MG FOR LOW K - Abnormal; Notable for the following components:    Chloride 92 (*)     CO2 43 (*)     Anion Gap 4 (*)     Glucose 201 (*)     Creatinine 0.4 (*)     Total Protein 6.1 (*)     Total Bilirubin 2.5 (*)     All other components within normal limits   TROPONIN - Abnormal; Notable for the following components:    Troponin, High Sensitivity 32 (*)     All other components within normal limits   PROTIME-INR - Abnormal; Notable for the following components:    Protime 16.3 (*)     All other components within normal limits   BLOOD GAS, VENOUS - Abnormal; Notable for the following components:    pH, Jonnathan 7.241 (*)     pCO2, Jonnathan 101.2 (*)     HCO3, Venous 42.5 (*)     Positive Base Excess, Jonnathan 11.3 (*)     Carboxyhemoglobin 2.4 (*)     Methemoglobin 0.3 (*)     All other components within normal limits   BASIC METABOLIC PANEL W/ REFLEX TO MG FOR LOW K - Abnormal; Notable for the following components:    Chloride 97 (*)     CO2 38 (*)     Anion

## 2025-02-13 NOTE — ED NOTES
1312 perfect serve message sent to Dr Herrera on in pt consult from hospitalist    1312 Dr Herrera acknowledged perfect serve message. Added to treatment team.

## 2025-02-13 NOTE — H&P
History and Physical      Name:  Jose Guadalupe GARSIA /Age/Sex: 1945  (79 y.o. male)   MRN & CSN:  [id number] & [id number] Encounter Date/Time: 2025 12:59 AM EST   Location:  [place] [address] PCP: Azeb Urias DO       Hospital Day: 2    Assessment and Plan:     #. Acute on chronic hypoxic and hypercapnic respiratory failure  -VBG-pH 7.241, pCO2 101.2, pO2 43.3, HCO3 42.5  -Chest x-ray-pending report  -Patient currently obtunded  -Placed on BiPAP  -Patient is DNR-CCA  -Monitor with repeat VBG    #.  Acute respiratory failure with hypoxia  -No wheezing on examination  -Oxygen saturation documented 68%  -Patient was initially on nonrebreather  -Patient was on 13 L of high flow oxygen during my evaluation and was saturating %  -Weaned down to 8 L and was still saturating 92-93%  -VBG-pH 7.241, pCO2 101.2, pO2 43.3, HCO3 42.5.  -Placed on BiPAP  -Monitor and wean off oxygen as tolerated  -Continue DuoNeb  -Check respiratory viral panel    #.  Acute metabolic encephalopathy-secondary to above  -Patient afebrile, no leukocytosis  -UA pending  -Patient was a stroke alert on presentation to ER  -CT head, CTA head and neck-no acute process  -Patient evaluated by [facility] stroke team not a TNK candidate-being on Eliquis.  -Continue NIHSS/PT/OT/SLP evaluation  -MRI brain ordered  -Patient's encephalopathy is most likely from hypercapnia    #.  History of PE-  -Status post IVC  -Was on Coumadin in the past, currently on Eliquis    #.  Detectable troponin  -Troponin 32, repeat ordered  -EKG with no acute ST-T wave changes-interpreted as A-fib, hard to interpret  -Will repeat EKG    #.  Hypertension-on amlodipine    #.  Diabetes mellitus type 2, on long-term insulin  -A1c-6.4-2024  -Patient is on Lantus 5 units daily  -Continue insulin sliding scale with hypoglycemia protocol    #.  Diabetic neuropathy-on gabapentin    #.  BPH-on tamsulosin, finasteride    #.  GERMAN on CPAP    #.

## 2025-02-13 NOTE — ED NOTES
Medication History  [facility]    Patient Name: Jose Guadalupe GARSIA 1945     Medication history has been completed by: Kristine Cooney [facility]    Source(s) of information: MAR provided by [facility]     Primary Care Physician: Azeb Urias DO     Pharmacy:     Allergies as of 02/12/2025 - Review Complete 03/03/2021   Allergen Reaction Noted    Sulfa antibiotics Other (See Comments) 01/26/2020    Tramadol Other (See Comments) 01/26/2020        Prior to Admission medications    Medication Sig Start Date End Date Taking? Authorizing Provider   insulin glargine (LANTUS) 100 UNIT/ML injection vial Inject 5 Units into the skin daily   Yes ProviderJared MD   ketorolac 0.4 % SOLN ophthalmic solution Place 1 drop into the left eye 4 times daily   Yes ProviderJared MD   mirtazapine (REMERON) 15 MG tablet Take 1 tablet by mouth nightly   Yes Provider, MD Jared   sennosides-docusate sodium (SENOKOT-S) 8.6-50 MG tablet Take 2 tablets by mouth daily   Yes Provider, MD Jared   sertraline (ZOLOFT) 25 MG tablet Take 1 tablet by mouth daily   Yes Provider, MD Jared   apixaban (ELIQUIS) 5 MG TABS tablet Take 1 tablet by mouth 2 times daily   Yes Provider, MD Jared   memantine (NAMENDA) 10 MG tablet Take 1 tablet by mouth 2 times daily   Yes Provider, MD Jared   gabapentin (NEURONTIN) 100 MG capsule Take 1 capsule by mouth 3 times daily.   Yes Provider, MD Jared   timolol (TIMOPTIC) 0.5 % ophthalmic solution Place 1 drop into both eyes daily   Yes Provider, MD Jared   albuterol (PROVENTIL) (2.5 MG/3ML) 0.083% nebulizer solution Take 3 mLs by nebulization 3 times daily   Yes Provider, MD Jared   bisacodyl (DULCOLAX) 10 MG suppository Place 1 suppository rectally daily as needed for Constipation   Yes ProviderJared MD   acetaminophen (TYLENOL) 325 MG tablet Take 2 tablets by mouth every 4 hours as needed for

## 2025-02-14 LAB
ANION GAP SERPL CALCULATED.3IONS-SCNC: 8 MMOL/L (ref 9–17)
ARTERIAL PATENCY WRIST A: ABNORMAL
BODY TEMPERATURE: 37
BUN SERPL-MCNC: 11 MG/DL (ref 7–20)
CALCIUM SERPL-MCNC: 10 MG/DL (ref 8.3–10.6)
CHLORIDE SERPL-SCNC: 99 MMOL/L (ref 99–110)
CO2 SERPL-SCNC: 38 MMOL/L (ref 21–32)
COHGB MFR BLD: 1.5 % (ref 0.5–1.5)
CREAT SERPL-MCNC: 0.4 MG/DL (ref 0.8–1.3)
ERYTHROCYTE [DISTWIDTH] IN BLOOD BY AUTOMATED COUNT: 14.3 % (ref 11.7–14.9)
GAS FLOW.O2 O2 DELIVERY SYS: ABNORMAL L/MIN
GFR, ESTIMATED: >90 ML/MIN/1.73M2
GLUCOSE BLD-MCNC: 161 MG/DL (ref 74–99)
GLUCOSE SERPL-MCNC: 172 MG/DL (ref 74–99)
HCO3 VENOUS: 38.1 MMOL/L (ref 22–29)
HCT VFR BLD AUTO: 41.1 % (ref 42–52)
HGB BLD-MCNC: 12.3 G/DL (ref 13.5–18)
MCH RBC QN AUTO: 32.5 PG (ref 27–31)
MCHC RBC AUTO-ENTMCNC: 29.9 G/DL (ref 32–36)
MCV RBC AUTO: 108.7 FL (ref 78–100)
METHEMOGLOBIN: 1.6 % (ref 0.5–1.5)
OXYHGB MFR BLD: 31.2 %
PCO2 VENOUS: 69.4 MM HG (ref 38–54)
PH VENOUS: 7.36 (ref 7.32–7.43)
PLATELET, FLUORESCENCE: 232 K/UL (ref 140–440)
PMV BLD AUTO: 11.5 FL (ref 7.5–11.1)
PO2 VENOUS: 21.3 MM HG (ref 23–48)
POSITIVE BASE EXCESS, VEN: 9.9 MMOL/L (ref 0–3)
POTASSIUM SERPL-SCNC: 3.9 MMOL/L (ref 3.5–5.1)
RBC # BLD AUTO: 3.78 M/UL (ref 4.6–6.2)
SODIUM SERPL-SCNC: 146 MMOL/L (ref 136–145)
TEXT FOR RESPIRATORY: ABNORMAL
WBC OTHER # BLD: 9.7 K/UL (ref 4–10.5)

## 2025-02-14 PROCEDURE — 82962 GLUCOSE BLOOD TEST: CPT

## 2025-02-14 PROCEDURE — 82805 BLOOD GASES W/O2 SATURATION: CPT

## 2025-02-14 PROCEDURE — 2700000000 HC OXYGEN THERAPY PER DAY

## 2025-02-14 PROCEDURE — 94660 CPAP INITIATION&MGMT: CPT

## 2025-02-14 PROCEDURE — 94761 N-INVAS EAR/PLS OXIMETRY MLT: CPT

## 2025-02-14 PROCEDURE — 80048 BASIC METABOLIC PNL TOTAL CA: CPT

## 2025-02-14 PROCEDURE — 94640 AIRWAY INHALATION TREATMENT: CPT

## 2025-02-14 PROCEDURE — 36415 COLL VENOUS BLD VENIPUNCTURE: CPT

## 2025-02-14 PROCEDURE — 2500000003 HC RX 250 WO HCPCS: Performed by: INTERNAL MEDICINE

## 2025-02-14 PROCEDURE — 6370000000 HC RX 637 (ALT 250 FOR IP): Performed by: INTERNAL MEDICINE

## 2025-02-14 PROCEDURE — 2060000000 HC ICU INTERMEDIATE R&B

## 2025-02-14 PROCEDURE — 85027 COMPLETE CBC AUTOMATED: CPT

## 2025-02-14 PROCEDURE — 6360000002 HC RX W HCPCS: Performed by: INTERNAL MEDICINE

## 2025-02-14 RX ADMIN — IPRATROPIUM BROMIDE AND ALBUTEROL SULFATE 1 DOSE: .5; 2.5 SOLUTION RESPIRATORY (INHALATION) at 16:32

## 2025-02-14 RX ADMIN — IPRATROPIUM BROMIDE AND ALBUTEROL SULFATE 1 DOSE: .5; 2.5 SOLUTION RESPIRATORY (INHALATION) at 12:31

## 2025-02-14 RX ADMIN — IPRATROPIUM BROMIDE AND ALBUTEROL SULFATE 1 DOSE: .5; 2.5 SOLUTION RESPIRATORY (INHALATION) at 08:32

## 2025-02-14 RX ADMIN — IPRATROPIUM BROMIDE AND ALBUTEROL SULFATE 1 DOSE: .5; 2.5 SOLUTION RESPIRATORY (INHALATION) at 20:23

## 2025-02-14 RX ADMIN — ASPIRIN 300 MG: 300 SUPPOSITORY RECTAL at 11:01

## 2025-02-14 RX ADMIN — WATER 125 MG: 1 INJECTION INTRAMUSCULAR; INTRAVENOUS; SUBCUTANEOUS at 11:01

## 2025-02-14 RX ADMIN — SODIUM CHLORIDE, PRESERVATIVE FREE 10 ML: 5 INJECTION INTRAVENOUS at 21:22

## 2025-02-14 RX ADMIN — SODIUM CHLORIDE, PRESERVATIVE FREE 10 ML: 5 INJECTION INTRAVENOUS at 11:01

## 2025-02-14 NOTE — DISCHARGE INSTR - COC
No  Urinary Catheter: None   Colostomy/Ileostomy/Ileal Conduit: No       Date of Last BM: 2/25/25      Intake/Output Summary (Last 24 hours) at 2/14/2025 1559  Last data filed at 2/14/2025 0523  Gross per 24 hour   Intake --   Output 250 ml   Net -250 ml     I/O last 3 completed shifts:  In: -   Out: 250 [Urine:250]    Safety Concerns:     None    Impairments/Disabilities:      None      Patient's personal belongings (please select all that are sent with patient):  None    RN SIGNATURE:  Electronically signed by JOHAN HERNANDEZ RN on 2/26/25 at 11:24 AM EST    CASE MANAGEMENT/SOCIAL WORK SECTION    Inpatient Status Date: 2/13/2025    Readmission Risk Assessment Score:  [facility] RISK OF UNPLANNED READMISSION 2.0             15.6 Total Score        Discharging to Facility/ Agency   Name:   Address:  Phone:  Fax:    Dialysis Facility (if applicable)   Name:  Address:  Dialysis Schedule:  Phone:  Fax:    / signature: Electronically signed by Mirian Mehta RN on 2/21/25 at 2:44 PM EST    PHYSICIAN SECTION    Nutrition Therapy:  Current Nutrition Therapy:   - Oral Diet:  Dysphagia - Minced and Moist  - Oral Nutrition Supplement:  Diabetic  three times a day  - Aspiration precautions    Routes of Feeding: Oral  Liquids: No Restrictions  Daily Fluid Restriction: no  Last Modified Barium Swallow with Video (Video Swallowing Test): not done    Treatments at the Time of Hospital Discharge:   Respiratory Treatments: prn. Nightly BiPAP and prn at 16/5  Oxygen Therapy:  is on oxygen at 2 L/min per nasal cannula.  Ventilator:    - No ventilator support    Rehab Therapies: Physical Therapy, Occupational Therapy, and Speech/Language Therapy  Weight Bearing Status/Restrictions: No weight bearing restrictions  Other Medical Equipment (for information only, NOT a DME order):  wheelchair, walker, bath bench, and bedside commode  Other Treatments: breathing treatments, finish course of antibiotics. Delirium

## 2025-02-14 NOTE — CARE COORDINATION
Cm in to see pt. Pt sleeping soundly with bipap in place. Per flowsheet pt is confused. Chart reviewed. CM contacted [facility] and confirmed that pt resides in their skilled nursing facility. Pt is a LTC resident and does not require precert to return. Cm to follow.   02/14/25 6314   Service Assessment   Patient Orientation Unable to Assess;Other (see comment)  (Pt sleeping at present but per RN flowsheet is confused to place,situation and time)   Cognition Other (see comment)  (Sleeping at present. Per RN flowsheet is confused to place, time and situation)   History Provided By Medical Record   Primary Caregiver Other (Comment)  ([facility] skilled nursing unit)   Accompanied By/Relationship N/A   Support Systems Other (Comment)  (friend in [place] (Lorna) and cousin in [place])   Patient's Healthcare Decision Maker is: Legal Next of Kin   PCP Verified by CM Yes   Last Visit to PCP Within last year   Prior Functional Level Assistance with the following:;Mobility;Bathing;Toileting;Dressing   Current Functional Level Assistance with the following:;Bathing;Toileting;Mobility;Dressing   Can patient return to prior living arrangement Yes   Ability to make needs known: Poor   Family able to assist with home care needs: No   Would you like for me to discuss the discharge plan with any other family members/significant others, and if so, who? Yes  (LNOK as needed)   Financial Resources Medicaid   Community Resources ECF/Home Care  ([facility] skilled nursing unit)   CM/SW Referral Other (see comment)  (discharge planning assessment, coordination of return to SNF at discharge.)

## 2025-02-15 ENCOUNTER — APPOINTMENT (OUTPATIENT)
Dept: MRI IMAGING | Age: 80
DRG: 189 | End: 2025-02-15
Payer: MEDICARE

## 2025-02-15 LAB
ARTERIAL PATENCY WRIST A: ABNORMAL
BODY TEMPERATURE: 37
COHGB MFR BLD: 0.1 % (ref 0.5–1.5)
GAS FLOW.O2 O2 DELIVERY SYS: ABNORMAL L/MIN
HCO3 VENOUS: 32.3 MMOL/L (ref 22–29)
METHEMOGLOBIN: 0.6 % (ref 0.5–1.5)
OXYHGB MFR BLD: 65.9 %
PCO2 VENOUS: 43.8 MM HG (ref 38–54)
PH VENOUS: 7.49 (ref 7.32–7.43)
PO2 VENOUS: 36.6 MM HG (ref 23–48)
POSITIVE BASE EXCESS, VEN: 7.9 MMOL/L (ref 0–3)

## 2025-02-15 PROCEDURE — 36415 COLL VENOUS BLD VENIPUNCTURE: CPT

## 2025-02-15 PROCEDURE — 82805 BLOOD GASES W/O2 SATURATION: CPT

## 2025-02-15 PROCEDURE — 2500000003 HC RX 250 WO HCPCS: Performed by: INTERNAL MEDICINE

## 2025-02-15 PROCEDURE — 94761 N-INVAS EAR/PLS OXIMETRY MLT: CPT

## 2025-02-15 PROCEDURE — 6370000000 HC RX 637 (ALT 250 FOR IP): Performed by: INTERNAL MEDICINE

## 2025-02-15 PROCEDURE — 2700000000 HC OXYGEN THERAPY PER DAY

## 2025-02-15 PROCEDURE — 94640 AIRWAY INHALATION TREATMENT: CPT

## 2025-02-15 PROCEDURE — 2060000000 HC ICU INTERMEDIATE R&B

## 2025-02-15 PROCEDURE — 70551 MRI BRAIN STEM W/O DYE: CPT

## 2025-02-15 PROCEDURE — 94660 CPAP INITIATION&MGMT: CPT

## 2025-02-15 PROCEDURE — 92610 EVALUATE SWALLOWING FUNCTION: CPT

## 2025-02-15 RX ADMIN — ATORVASTATIN CALCIUM 40 MG: 40 TABLET, FILM COATED ORAL at 21:21

## 2025-02-15 RX ADMIN — SODIUM CHLORIDE, PRESERVATIVE FREE 10 ML: 5 INJECTION INTRAVENOUS at 08:52

## 2025-02-15 RX ADMIN — IPRATROPIUM BROMIDE AND ALBUTEROL SULFATE 1 DOSE: .5; 2.5 SOLUTION RESPIRATORY (INHALATION) at 08:37

## 2025-02-15 RX ADMIN — IPRATROPIUM BROMIDE AND ALBUTEROL SULFATE 1 DOSE: .5; 2.5 SOLUTION RESPIRATORY (INHALATION) at 12:54

## 2025-02-15 RX ADMIN — IPRATROPIUM BROMIDE AND ALBUTEROL SULFATE 1 DOSE: .5; 2.5 SOLUTION RESPIRATORY (INHALATION) at 21:50

## 2025-02-15 RX ADMIN — SODIUM CHLORIDE, PRESERVATIVE FREE 10 ML: 5 INJECTION INTRAVENOUS at 21:22

## 2025-02-15 RX ADMIN — ASPIRIN 300 MG: 300 SUPPOSITORY RECTAL at 08:51

## 2025-02-16 LAB
BACTERIA URNS QL MICRO: ABNORMAL
BILIRUB UR QL STRIP: ABNORMAL
CLARITY UR: ABNORMAL
COLOR UR: YELLOW
GLUCOSE UR STRIP-MCNC: NEGATIVE MG/DL
HGB UR QL STRIP.AUTO: NEGATIVE
KETONES UR STRIP-MCNC: NEGATIVE MG/DL
LEUKOCYTE ESTERASE UR QL STRIP: ABNORMAL
MUCOUS THREADS URNS QL MICRO: ABNORMAL
NITRITE UR QL STRIP: NEGATIVE
PH UR STRIP: 6.5 [PH] (ref 5–8)
PROT UR STRIP-MCNC: ABNORMAL MG/DL
RBC #/AREA URNS HPF: 0 /HPF (ref 0–2)
SP GR UR STRIP: 1.01 (ref 1–1.03)
UROBILINOGEN UR STRIP-ACNC: >8 EU/DL (ref 0–1)
WBC #/AREA URNS HPF: 50 /HPF (ref 0–5)

## 2025-02-16 PROCEDURE — 81001 URINALYSIS AUTO W/SCOPE: CPT

## 2025-02-16 PROCEDURE — 94640 AIRWAY INHALATION TREATMENT: CPT

## 2025-02-16 PROCEDURE — 1200000000 HC SEMI PRIVATE

## 2025-02-16 PROCEDURE — 2500000003 HC RX 250 WO HCPCS: Performed by: STUDENT IN AN ORGANIZED HEALTH CARE EDUCATION/TRAINING PROGRAM

## 2025-02-16 PROCEDURE — 94761 N-INVAS EAR/PLS OXIMETRY MLT: CPT

## 2025-02-16 PROCEDURE — 2500000003 HC RX 250 WO HCPCS: Performed by: INTERNAL MEDICINE

## 2025-02-16 PROCEDURE — 2700000000 HC OXYGEN THERAPY PER DAY

## 2025-02-16 PROCEDURE — 6360000002 HC RX W HCPCS: Performed by: STUDENT IN AN ORGANIZED HEALTH CARE EDUCATION/TRAINING PROGRAM

## 2025-02-16 PROCEDURE — 6360000002 HC RX W HCPCS: Performed by: INTERNAL MEDICINE

## 2025-02-16 PROCEDURE — 6370000000 HC RX 637 (ALT 250 FOR IP): Performed by: INTERNAL MEDICINE

## 2025-02-16 PROCEDURE — 87086 URINE CULTURE/COLONY COUNT: CPT

## 2025-02-16 RX ORDER — IPRATROPIUM BROMIDE AND ALBUTEROL SULFATE 2.5; .5 MG/3ML; MG/3ML
1 SOLUTION RESPIRATORY (INHALATION) EVERY 4 HOURS PRN
Status: DISCONTINUED | OUTPATIENT
Start: 2025-02-16 | End: 2025-02-26 | Stop reason: HOSPADM

## 2025-02-16 RX ORDER — ALBUTEROL SULFATE 0.83 MG/ML
2.5 SOLUTION RESPIRATORY (INHALATION)
Status: DISCONTINUED | OUTPATIENT
Start: 2025-02-16 | End: 2025-02-26 | Stop reason: HOSPADM

## 2025-02-16 RX ADMIN — SODIUM CHLORIDE, PRESERVATIVE FREE 10 ML: 5 INJECTION INTRAVENOUS at 10:12

## 2025-02-16 RX ADMIN — SODIUM CHLORIDE, PRESERVATIVE FREE 10 ML: 5 INJECTION INTRAVENOUS at 21:07

## 2025-02-16 RX ADMIN — ATORVASTATIN CALCIUM 40 MG: 40 TABLET, FILM COATED ORAL at 21:07

## 2025-02-16 RX ADMIN — ALBUTEROL SULFATE 2.5 MG: 2.5 SOLUTION RESPIRATORY (INHALATION) at 21:37

## 2025-02-16 RX ADMIN — ASPIRIN 81 MG CHEWABLE TABLET 81 MG: 81 TABLET CHEWABLE at 10:11

## 2025-02-16 RX ADMIN — ALBUTEROL SULFATE 2.5 MG: 2.5 SOLUTION RESPIRATORY (INHALATION) at 14:54

## 2025-02-16 RX ADMIN — SODIUM CHLORIDE, PRESERVATIVE FREE 10 ML: 5 INJECTION INTRAVENOUS at 14:04

## 2025-02-16 RX ADMIN — IPRATROPIUM BROMIDE AND ALBUTEROL SULFATE 1 DOSE: .5; 2.5 SOLUTION RESPIRATORY (INHALATION) at 08:47

## 2025-02-16 RX ADMIN — WATER 1000 MG: 1 INJECTION INTRAMUSCULAR; INTRAVENOUS; SUBCUTANEOUS at 14:04

## 2025-02-16 NOTE — RT PROTOCOL NOTE
RT Inhaler-Nebulizer Bronchodilator Protocol Note    There is a bronchodilator order in the chart from a provider indicating to follow the RT Bronchodilator Protocol and there is an “Initiate RT Inhaler-Nebulizer Bronchodilator Protocol” order as well (see protocol at bottom of note).    CXR Findings:  No results found.    The findings from the last RT Protocol Assessment were as follows:   History Pulmonary Disease: None or smoker <15 pack years  Respiratory Pattern: Regular pattern and RR 12-20 bpm  Breath Sounds: Slightly diminished and/or crackles  Cough: Strong, spontaneous, non-productive  Indication for Bronchodilator Therapy: On home bronchodilators (per home med list-- Albuterol TID at home)  Bronchodilator Assessment Score: 2 QUALIFIES FOR PRN TXS HERE-BUT PER HOME MED LIST, ON ALBUTEROL TID AT HOME    Aerosolized bronchodilator medication orders have been revised according to the RT Inhaler-Nebulizer Bronchodilator Protocol below.    Respiratory Therapist to perform RT Therapy Protocol Assessment initially then follow the protocol.  Repeat RT Therapy Protocol Assessment PRN for score 0-3 or on second treatment, BID, and PRN for scores above 3.    No Indications - adjust the frequency to every 6 hours PRN wheezing or bronchospasm, if no treatments needed after 48 hours then discontinue using Per Protocol order mode.     If indication present, adjust the RT bronchodilator orders based on the Bronchodilator Assessment Score as indicated below.  Use Inhaler orders unless patient has one or more of the following: on home nebulizer, not able to hold breath for 10 seconds, is not alert and oriented, cannot activate and use MDI correctly, or respiratory rate 25 breaths per minute or more, then use the equivalent nebulizer order(s) with same Frequency and PRN reasons based on the score.  If a patient is on this medication at home then do not decrease Frequency below that used at home.    0-3 - enter or revise RT

## 2025-02-17 LAB
ANION GAP SERPL CALCULATED.3IONS-SCNC: 10 MMOL/L (ref 9–17)
ARTERIAL PATENCY WRIST A: ABNORMAL
BODY TEMPERATURE: 37
BUN SERPL-MCNC: 7 MG/DL (ref 7–20)
CALCIUM SERPL-MCNC: 9.2 MG/DL (ref 8.3–10.6)
CHLORIDE SERPL-SCNC: 96 MMOL/L (ref 99–110)
CO2 SERPL-SCNC: 30 MMOL/L (ref 21–32)
COHGB MFR BLD: 1.9 % (ref 0.5–1.5)
CREAT SERPL-MCNC: 0.4 MG/DL (ref 0.8–1.3)
ERYTHROCYTE [DISTWIDTH] IN BLOOD BY AUTOMATED COUNT: 13.8 % (ref 11.7–14.9)
GFR, ESTIMATED: >90 ML/MIN/1.73M2
GLUCOSE SERPL-MCNC: 213 MG/DL (ref 74–99)
HCO3 VENOUS: 33.6 MMOL/L (ref 22–29)
HCT VFR BLD AUTO: 30.1 % (ref 42–52)
HGB BLD-MCNC: 10.7 G/DL (ref 13.5–18)
MCH RBC QN AUTO: 38.8 PG (ref 27–31)
MCHC RBC AUTO-ENTMCNC: 35.5 G/DL (ref 32–36)
MCV RBC AUTO: 109.1 FL (ref 78–100)
METHEMOGLOBIN: 0.4 % (ref 0.5–1.5)
MICROORGANISM SPEC CULT: NORMAL
OXYHGB MFR BLD: 59.2 %
PCO2 VENOUS: 48.6 MM HG (ref 38–54)
PH VENOUS: 7.46 (ref 7.32–7.43)
PLATELET # BLD AUTO: 195 K/UL (ref 140–440)
PMV BLD AUTO: 10.9 FL (ref 7.5–11.1)
PO2 VENOUS: 32.8 MM HG (ref 23–48)
POSITIVE BASE EXCESS, VEN: 8.6 MMOL/L (ref 0–3)
POTASSIUM SERPL-SCNC: 3.1 MMOL/L (ref 3.5–5.1)
RBC # BLD AUTO: 2.76 M/UL (ref 4.6–6.2)
SERVICE CMNT-IMP: NORMAL
SODIUM SERPL-SCNC: 137 MMOL/L (ref 136–145)
SPECIMEN DESCRIPTION: NORMAL
WBC OTHER # BLD: 5.4 K/UL (ref 4–10.5)

## 2025-02-17 PROCEDURE — 80048 BASIC METABOLIC PNL TOTAL CA: CPT

## 2025-02-17 PROCEDURE — 6360000002 HC RX W HCPCS: Performed by: STUDENT IN AN ORGANIZED HEALTH CARE EDUCATION/TRAINING PROGRAM

## 2025-02-17 PROCEDURE — 6370000000 HC RX 637 (ALT 250 FOR IP): Performed by: INTERNAL MEDICINE

## 2025-02-17 PROCEDURE — 94640 AIRWAY INHALATION TREATMENT: CPT

## 2025-02-17 PROCEDURE — 6360000002 HC RX W HCPCS: Performed by: INTERNAL MEDICINE

## 2025-02-17 PROCEDURE — 94761 N-INVAS EAR/PLS OXIMETRY MLT: CPT

## 2025-02-17 PROCEDURE — 82805 BLOOD GASES W/O2 SATURATION: CPT

## 2025-02-17 PROCEDURE — 1200000000 HC SEMI PRIVATE

## 2025-02-17 PROCEDURE — 85027 COMPLETE CBC AUTOMATED: CPT

## 2025-02-17 PROCEDURE — 2700000000 HC OXYGEN THERAPY PER DAY

## 2025-02-17 PROCEDURE — 2500000003 HC RX 250 WO HCPCS: Performed by: INTERNAL MEDICINE

## 2025-02-17 PROCEDURE — 36415 COLL VENOUS BLD VENIPUNCTURE: CPT

## 2025-02-17 PROCEDURE — 2500000003 HC RX 250 WO HCPCS: Performed by: STUDENT IN AN ORGANIZED HEALTH CARE EDUCATION/TRAINING PROGRAM

## 2025-02-17 PROCEDURE — 6370000000 HC RX 637 (ALT 250 FOR IP): Performed by: STUDENT IN AN ORGANIZED HEALTH CARE EDUCATION/TRAINING PROGRAM

## 2025-02-17 PROCEDURE — 94660 CPAP INITIATION&MGMT: CPT

## 2025-02-17 RX ORDER — POTASSIUM CHLORIDE 7.45 MG/ML
10 INJECTION INTRAVENOUS PRN
Status: DISCONTINUED | OUTPATIENT
Start: 2025-02-17 | End: 2025-02-26 | Stop reason: HOSPADM

## 2025-02-17 RX ORDER — POTASSIUM CHLORIDE 1500 MG/1
40 TABLET, EXTENDED RELEASE ORAL PRN
Status: DISCONTINUED | OUTPATIENT
Start: 2025-02-17 | End: 2025-02-26 | Stop reason: HOSPADM

## 2025-02-17 RX ORDER — ACETAMINOPHEN 325 MG/1
650 TABLET ORAL EVERY 4 HOURS PRN
Status: DISCONTINUED | OUTPATIENT
Start: 2025-02-17 | End: 2025-02-26 | Stop reason: HOSPADM

## 2025-02-17 RX ADMIN — ALBUTEROL SULFATE 2.5 MG: 2.5 SOLUTION RESPIRATORY (INHALATION) at 07:14

## 2025-02-17 RX ADMIN — SODIUM CHLORIDE, PRESERVATIVE FREE 10 ML: 5 INJECTION INTRAVENOUS at 10:27

## 2025-02-17 RX ADMIN — ALBUTEROL SULFATE 2.5 MG: 2.5 SOLUTION RESPIRATORY (INHALATION) at 20:12

## 2025-02-17 RX ADMIN — POTASSIUM CHLORIDE 40 MEQ: 1500 TABLET, EXTENDED RELEASE ORAL at 14:13

## 2025-02-17 RX ADMIN — ASPIRIN 81 MG CHEWABLE TABLET 81 MG: 81 TABLET CHEWABLE at 10:27

## 2025-02-17 RX ADMIN — ACETAMINOPHEN 650 MG: 325 TABLET ORAL at 12:53

## 2025-02-17 RX ADMIN — SODIUM CHLORIDE, PRESERVATIVE FREE 10 ML: 5 INJECTION INTRAVENOUS at 20:24

## 2025-02-17 RX ADMIN — WATER 1000 MG: 1 INJECTION INTRAMUSCULAR; INTRAVENOUS; SUBCUTANEOUS at 12:51

## 2025-02-17 RX ADMIN — ALBUTEROL SULFATE 2.5 MG: 2.5 SOLUTION RESPIRATORY (INHALATION) at 14:35

## 2025-02-17 RX ADMIN — ATORVASTATIN CALCIUM 40 MG: 40 TABLET, FILM COATED ORAL at 20:23

## 2025-02-17 ASSESSMENT — PAIN DESCRIPTION - FREQUENCY
FREQUENCY: CONTINUOUS
FREQUENCY: CONTINUOUS

## 2025-02-17 ASSESSMENT — PAIN DESCRIPTION - LOCATION
LOCATION: FACE
LOCATION: FACE

## 2025-02-17 ASSESSMENT — PAIN SCALES - GENERAL
PAINLEVEL_OUTOF10: 3
PAINLEVEL_OUTOF10: 0
PAINLEVEL_OUTOF10: 3

## 2025-02-17 ASSESSMENT — PAIN DESCRIPTION - ONSET
ONSET: ON-GOING
ONSET: ON-GOING

## 2025-02-17 ASSESSMENT — PAIN DESCRIPTION - DESCRIPTORS
DESCRIPTORS: ACHING
DESCRIPTORS: ACHING

## 2025-02-17 ASSESSMENT — PAIN DESCRIPTION - ORIENTATION
ORIENTATION: UPPER
ORIENTATION: UPPER

## 2025-02-17 ASSESSMENT — PAIN DESCRIPTION - PAIN TYPE
TYPE: ACUTE PAIN
TYPE: ACUTE PAIN

## 2025-02-17 ASSESSMENT — PAIN - FUNCTIONAL ASSESSMENT
PAIN_FUNCTIONAL_ASSESSMENT: ACTIVITIES ARE NOT PREVENTED
PAIN_FUNCTIONAL_ASSESSMENT: ACTIVITIES ARE NOT PREVENTED

## 2025-02-18 LAB
ARTERIAL PATENCY WRIST A: NO
BODY TEMPERATURE: 37
COHGB MFR BLD: 1.5 % (ref 0.5–1.5)
GLUCOSE BLD-MCNC: 185 MG/DL (ref 74–99)
GLUCOSE BLD-MCNC: 212 MG/DL (ref 74–99)
HCO3 VENOUS: 32.2 MMOL/L (ref 22–29)
METHEMOGLOBIN: 0.6 % (ref 0.5–1.5)
OXYHGB MFR BLD: 93.7 %
PCO2 VENOUS: 57.1 MM HG (ref 38–54)
PH VENOUS: 7.37 (ref 7.32–7.43)
PO2 VENOUS: 90.5 MM HG (ref 23–48)
POSITIVE BASE EXCESS, VEN: 5.4 MMOL/L (ref 0–3)

## 2025-02-18 PROCEDURE — 94640 AIRWAY INHALATION TREATMENT: CPT

## 2025-02-18 PROCEDURE — 2500000003 HC RX 250 WO HCPCS: Performed by: STUDENT IN AN ORGANIZED HEALTH CARE EDUCATION/TRAINING PROGRAM

## 2025-02-18 PROCEDURE — 2700000000 HC OXYGEN THERAPY PER DAY

## 2025-02-18 PROCEDURE — 6360000002 HC RX W HCPCS: Performed by: STUDENT IN AN ORGANIZED HEALTH CARE EDUCATION/TRAINING PROGRAM

## 2025-02-18 PROCEDURE — 6370000000 HC RX 637 (ALT 250 FOR IP): Performed by: INTERNAL MEDICINE

## 2025-02-18 PROCEDURE — 94660 CPAP INITIATION&MGMT: CPT

## 2025-02-18 PROCEDURE — 94761 N-INVAS EAR/PLS OXIMETRY MLT: CPT

## 2025-02-18 PROCEDURE — 6370000000 HC RX 637 (ALT 250 FOR IP): Performed by: HOSPITALIST

## 2025-02-18 PROCEDURE — 82805 BLOOD GASES W/O2 SATURATION: CPT

## 2025-02-18 PROCEDURE — 1200000000 HC SEMI PRIVATE

## 2025-02-18 PROCEDURE — 6360000002 HC RX W HCPCS: Performed by: INTERNAL MEDICINE

## 2025-02-18 PROCEDURE — 2500000003 HC RX 250 WO HCPCS: Performed by: INTERNAL MEDICINE

## 2025-02-18 PROCEDURE — 36415 COLL VENOUS BLD VENIPUNCTURE: CPT

## 2025-02-18 PROCEDURE — 82962 GLUCOSE BLOOD TEST: CPT

## 2025-02-18 RX ORDER — FINASTERIDE 5 MG/1
5 TABLET, FILM COATED ORAL DAILY
Status: DISCONTINUED | OUTPATIENT
Start: 2025-02-18 | End: 2025-02-26 | Stop reason: HOSPADM

## 2025-02-18 RX ORDER — DEXTROSE MONOHYDRATE 100 MG/ML
INJECTION, SOLUTION INTRAVENOUS CONTINUOUS PRN
Status: DISCONTINUED | OUTPATIENT
Start: 2025-02-18 | End: 2025-02-26 | Stop reason: HOSPADM

## 2025-02-18 RX ORDER — GLUCAGON 1 MG/ML
1 KIT INJECTION PRN
Status: DISCONTINUED | OUTPATIENT
Start: 2025-02-18 | End: 2025-02-26 | Stop reason: HOSPADM

## 2025-02-18 RX ORDER — AMLODIPINE BESYLATE 5 MG/1
2.5 TABLET ORAL NIGHTLY
Status: DISCONTINUED | OUTPATIENT
Start: 2025-02-18 | End: 2025-02-26 | Stop reason: HOSPADM

## 2025-02-18 RX ORDER — TAMSULOSIN HYDROCHLORIDE 0.4 MG/1
0.4 CAPSULE ORAL DAILY
Status: DISCONTINUED | OUTPATIENT
Start: 2025-02-18 | End: 2025-02-26 | Stop reason: HOSPADM

## 2025-02-18 RX ORDER — MIRTAZAPINE 15 MG/1
15 TABLET, FILM COATED ORAL NIGHTLY
Status: DISCONTINUED | OUTPATIENT
Start: 2025-02-18 | End: 2025-02-26 | Stop reason: HOSPADM

## 2025-02-18 RX ORDER — MEMANTINE HYDROCHLORIDE 10 MG/1
10 TABLET ORAL 2 TIMES DAILY
Status: DISCONTINUED | OUTPATIENT
Start: 2025-02-18 | End: 2025-02-26 | Stop reason: HOSPADM

## 2025-02-18 RX ORDER — INSULIN LISPRO 100 [IU]/ML
0-4 INJECTION, SOLUTION INTRAVENOUS; SUBCUTANEOUS
Status: DISCONTINUED | OUTPATIENT
Start: 2025-02-18 | End: 2025-02-26 | Stop reason: HOSPADM

## 2025-02-18 RX ORDER — INSULIN GLARGINE 100 [IU]/ML
5 INJECTION, SOLUTION SUBCUTANEOUS DAILY
Status: DISCONTINUED | OUTPATIENT
Start: 2025-02-18 | End: 2025-02-26 | Stop reason: HOSPADM

## 2025-02-18 RX ORDER — VITAMIN B COMPLEX
5000 TABLET ORAL DAILY
Status: DISCONTINUED | OUTPATIENT
Start: 2025-02-18 | End: 2025-02-26 | Stop reason: HOSPADM

## 2025-02-18 RX ADMIN — INSULIN LISPRO 1 UNITS: 100 INJECTION, SOLUTION INTRAVENOUS; SUBCUTANEOUS at 18:31

## 2025-02-18 RX ADMIN — ALBUTEROL SULFATE 2.5 MG: 2.5 SOLUTION RESPIRATORY (INHALATION) at 07:13

## 2025-02-18 RX ADMIN — SERTRALINE HYDROCHLORIDE 25 MG: 50 TABLET ORAL at 18:29

## 2025-02-18 RX ADMIN — ASPIRIN 81 MG CHEWABLE TABLET 81 MG: 81 TABLET CHEWABLE at 10:37

## 2025-02-18 RX ADMIN — SODIUM CHLORIDE, PRESERVATIVE FREE 10 ML: 5 INJECTION INTRAVENOUS at 21:48

## 2025-02-18 RX ADMIN — IPRATROPIUM BROMIDE AND ALBUTEROL SULFATE 1 DOSE: .5; 3 SOLUTION RESPIRATORY (INHALATION) at 14:37

## 2025-02-18 RX ADMIN — Medication 5000 UNITS: at 18:28

## 2025-02-18 RX ADMIN — SODIUM CHLORIDE, PRESERVATIVE FREE 10 ML: 5 INJECTION INTRAVENOUS at 10:37

## 2025-02-18 RX ADMIN — INSULIN LISPRO 1 UNITS: 100 INJECTION, SOLUTION INTRAVENOUS; SUBCUTANEOUS at 21:56

## 2025-02-18 RX ADMIN — WATER 1000 MG: 1 INJECTION INTRAMUSCULAR; INTRAVENOUS; SUBCUTANEOUS at 14:12

## 2025-02-18 RX ADMIN — INSULIN GLARGINE 5 UNITS: 100 INJECTION, SOLUTION SUBCUTANEOUS at 21:56

## 2025-02-18 RX ADMIN — TAMSULOSIN HYDROCHLORIDE 0.4 MG: 0.4 CAPSULE ORAL at 18:29

## 2025-02-18 RX ADMIN — ALBUTEROL SULFATE 2.5 MG: 2.5 SOLUTION RESPIRATORY (INHALATION) at 20:47

## 2025-02-18 RX ADMIN — FINASTERIDE 5 MG: 5 TABLET, FILM COATED ORAL at 18:29

## 2025-02-18 ASSESSMENT — PAIN SCALES - WONG BAKER
WONGBAKER_NUMERICALRESPONSE: NO HURT
WONGBAKER_NUMERICALRESPONSE: NO HURT

## 2025-02-18 ASSESSMENT — PAIN SCALES - GENERAL
PAINLEVEL_OUTOF10: 0
PAINLEVEL_OUTOF10: 0

## 2025-02-18 NOTE — CARE COORDINATION
CM reviewed chart and discussed in IDR. Plan is [facility]. Currently, not medically stable for discharge.

## 2025-02-19 LAB
ANION GAP SERPL CALCULATED.3IONS-SCNC: 8 MMOL/L (ref 9–17)
BUN SERPL-MCNC: 9 MG/DL (ref 7–20)
CALCIUM SERPL-MCNC: 9.6 MG/DL (ref 8.3–10.6)
CHLORIDE SERPL-SCNC: 98 MMOL/L (ref 99–110)
CO2 SERPL-SCNC: 31 MMOL/L (ref 21–32)
CREAT SERPL-MCNC: 0.4 MG/DL (ref 0.8–1.3)
GFR, ESTIMATED: >90 ML/MIN/1.73M2
GLUCOSE BLD-MCNC: 205 MG/DL (ref 74–99)
GLUCOSE BLD-MCNC: 205 MG/DL (ref 74–99)
GLUCOSE BLD-MCNC: 230 MG/DL (ref 74–99)
GLUCOSE BLD-MCNC: 264 MG/DL (ref 74–99)
GLUCOSE SERPL-MCNC: 245 MG/DL (ref 74–99)
POTASSIUM SERPL-SCNC: 3.6 MMOL/L (ref 3.5–5.1)
SODIUM SERPL-SCNC: 138 MMOL/L (ref 136–145)

## 2025-02-19 PROCEDURE — 97530 THERAPEUTIC ACTIVITIES: CPT

## 2025-02-19 PROCEDURE — 94761 N-INVAS EAR/PLS OXIMETRY MLT: CPT

## 2025-02-19 PROCEDURE — 2700000000 HC OXYGEN THERAPY PER DAY

## 2025-02-19 PROCEDURE — 82962 GLUCOSE BLOOD TEST: CPT

## 2025-02-19 PROCEDURE — 6370000000 HC RX 637 (ALT 250 FOR IP): Performed by: INTERNAL MEDICINE

## 2025-02-19 PROCEDURE — 51798 US URINE CAPACITY MEASURE: CPT

## 2025-02-19 PROCEDURE — 1200000000 HC SEMI PRIVATE

## 2025-02-19 PROCEDURE — 2500000003 HC RX 250 WO HCPCS: Performed by: INTERNAL MEDICINE

## 2025-02-19 PROCEDURE — 80048 BASIC METABOLIC PNL TOTAL CA: CPT

## 2025-02-19 PROCEDURE — 92526 ORAL FUNCTION THERAPY: CPT

## 2025-02-19 PROCEDURE — 36415 COLL VENOUS BLD VENIPUNCTURE: CPT

## 2025-02-19 PROCEDURE — 6360000002 HC RX W HCPCS: Performed by: INTERNAL MEDICINE

## 2025-02-19 PROCEDURE — 94640 AIRWAY INHALATION TREATMENT: CPT

## 2025-02-19 PROCEDURE — 6370000000 HC RX 637 (ALT 250 FOR IP): Performed by: HOSPITALIST

## 2025-02-19 PROCEDURE — 97166 OT EVAL MOD COMPLEX 45 MIN: CPT

## 2025-02-19 RX ADMIN — INSULIN GLARGINE 5 UNITS: 100 INJECTION, SOLUTION SUBCUTANEOUS at 20:50

## 2025-02-19 RX ADMIN — ATORVASTATIN CALCIUM 40 MG: 40 TABLET, FILM COATED ORAL at 20:50

## 2025-02-19 RX ADMIN — ALBUTEROL SULFATE 2.5 MG: 2.5 SOLUTION RESPIRATORY (INHALATION) at 15:55

## 2025-02-19 RX ADMIN — MEMANTINE HYDROCHLORIDE 10 MG: 10 TABLET, FILM COATED ORAL at 08:47

## 2025-02-19 RX ADMIN — ASPIRIN 81 MG CHEWABLE TABLET 81 MG: 81 TABLET CHEWABLE at 08:48

## 2025-02-19 RX ADMIN — SODIUM CHLORIDE, PRESERVATIVE FREE 10 ML: 5 INJECTION INTRAVENOUS at 20:51

## 2025-02-19 RX ADMIN — FINASTERIDE 5 MG: 5 TABLET, FILM COATED ORAL at 08:52

## 2025-02-19 RX ADMIN — APIXABAN 5 MG: 5 TABLET, FILM COATED ORAL at 08:47

## 2025-02-19 RX ADMIN — ALBUTEROL SULFATE 2.5 MG: 2.5 SOLUTION RESPIRATORY (INHALATION) at 19:23

## 2025-02-19 RX ADMIN — INSULIN LISPRO 2 UNITS: 100 INJECTION, SOLUTION INTRAVENOUS; SUBCUTANEOUS at 20:50

## 2025-02-19 RX ADMIN — SODIUM CHLORIDE, PRESERVATIVE FREE 10 ML: 5 INJECTION INTRAVENOUS at 18:16

## 2025-02-19 RX ADMIN — ALBUTEROL SULFATE 2.5 MG: 2.5 SOLUTION RESPIRATORY (INHALATION) at 08:17

## 2025-02-19 RX ADMIN — AMLODIPINE BESYLATE 2.5 MG: 5 TABLET ORAL at 20:50

## 2025-02-19 RX ADMIN — INSULIN LISPRO 1 UNITS: 100 INJECTION, SOLUTION INTRAVENOUS; SUBCUTANEOUS at 18:15

## 2025-02-19 RX ADMIN — SERTRALINE HYDROCHLORIDE 25 MG: 50 TABLET ORAL at 08:47

## 2025-02-19 RX ADMIN — MIRTAZAPINE 15 MG: 15 TABLET, FILM COATED ORAL at 20:50

## 2025-02-19 RX ADMIN — INSULIN LISPRO 1 UNITS: 100 INJECTION, SOLUTION INTRAVENOUS; SUBCUTANEOUS at 08:49

## 2025-02-19 RX ADMIN — INSULIN LISPRO 1 UNITS: 100 INJECTION, SOLUTION INTRAVENOUS; SUBCUTANEOUS at 13:16

## 2025-02-19 RX ADMIN — TAMSULOSIN HYDROCHLORIDE 0.4 MG: 0.4 CAPSULE ORAL at 08:48

## 2025-02-19 RX ADMIN — Medication 5000 UNITS: at 08:47

## 2025-02-19 RX ADMIN — APIXABAN 5 MG: 5 TABLET, FILM COATED ORAL at 20:50

## 2025-02-19 RX ADMIN — MEMANTINE HYDROCHLORIDE 10 MG: 10 TABLET, FILM COATED ORAL at 20:50

## 2025-02-19 ASSESSMENT — PAIN SCALES - GENERAL
PAINLEVEL_OUTOF10: 0
PAINLEVEL_OUTOF10: 0

## 2025-02-19 ASSESSMENT — PAIN SCALES - WONG BAKER
WONGBAKER_NUMERICALRESPONSE: NO HURT
WONGBAKER_NUMERICALRESPONSE: NO HURT

## 2025-02-19 NOTE — CARE COORDINATION
CM reviewed chart & discussed in IDR. Currently, pt is not medically stable for discharge. CM spoke with Judi at [facility] to give an update.  Plan remains [facility] when medically stable. No pre-cert is needed to return.

## 2025-02-20 LAB
ADDITIONAL COMMENT:: NORMAL
ANION GAP SERPL CALCULATED.3IONS-SCNC: 8 MMOL/L (ref 9–17)
BUN SERPL-MCNC: 6 MG/DL (ref 7–20)
CALCIUM SERPL-MCNC: 9.3 MG/DL (ref 8.3–10.6)
CHLORIDE SERPL-SCNC: 97 MMOL/L (ref 99–110)
CO2 SERPL-SCNC: 30 MMOL/L (ref 21–32)
CREAT SERPL-MCNC: 0.3 MG/DL (ref 0.8–1.3)
ERYTHROCYTE [DISTWIDTH] IN BLOOD BY AUTOMATED COUNT: 16.9 % (ref 11.7–14.9)
GFR, ESTIMATED: >90 ML/MIN/1.73M2
GLUCOSE BLD-MCNC: 168 MG/DL (ref 74–99)
GLUCOSE BLD-MCNC: 174 MG/DL (ref 74–99)
GLUCOSE BLD-MCNC: 180 MG/DL (ref 74–99)
GLUCOSE BLD-MCNC: 221 MG/DL (ref 74–99)
GLUCOSE SERPL-MCNC: 141 MG/DL (ref 74–99)
HCT VFR BLD AUTO: 27.9 % (ref 42–52)
HGB BLD-MCNC: 9.9 G/DL (ref 13.5–18)
MCH RBC QN AUTO: 39.4 PG (ref 27–31)
MCHC RBC AUTO-ENTMCNC: 35.5 G/DL (ref 32–36)
MCV RBC AUTO: 111.2 FL (ref 78–100)
PLATELET # BLD AUTO: 216 K/UL (ref 140–440)
PMV BLD AUTO: 11 FL (ref 7.5–11.1)
POTASSIUM SERPL-SCNC: 3.4 MMOL/L (ref 3.5–5.1)
RBC # BLD AUTO: 2.51 M/UL (ref 4.6–6.2)
SODIUM SERPL-SCNC: 136 MMOL/L (ref 136–145)
WBC OTHER # BLD: 6.3 K/UL (ref 4–10.5)

## 2025-02-20 PROCEDURE — 6370000000 HC RX 637 (ALT 250 FOR IP): Performed by: INTERNAL MEDICINE

## 2025-02-20 PROCEDURE — 6370000000 HC RX 637 (ALT 250 FOR IP): Performed by: STUDENT IN AN ORGANIZED HEALTH CARE EDUCATION/TRAINING PROGRAM

## 2025-02-20 PROCEDURE — 6360000002 HC RX W HCPCS: Performed by: INTERNAL MEDICINE

## 2025-02-20 PROCEDURE — 85027 COMPLETE CBC AUTOMATED: CPT

## 2025-02-20 PROCEDURE — 2500000003 HC RX 250 WO HCPCS: Performed by: INTERNAL MEDICINE

## 2025-02-20 PROCEDURE — 6370000000 HC RX 637 (ALT 250 FOR IP): Performed by: HOSPITALIST

## 2025-02-20 PROCEDURE — 82962 GLUCOSE BLOOD TEST: CPT

## 2025-02-20 PROCEDURE — 36415 COLL VENOUS BLD VENIPUNCTURE: CPT

## 2025-02-20 PROCEDURE — 80048 BASIC METABOLIC PNL TOTAL CA: CPT

## 2025-02-20 PROCEDURE — 1200000000 HC SEMI PRIVATE

## 2025-02-20 PROCEDURE — 94640 AIRWAY INHALATION TREATMENT: CPT

## 2025-02-20 PROCEDURE — 94761 N-INVAS EAR/PLS OXIMETRY MLT: CPT

## 2025-02-20 RX ADMIN — INSULIN LISPRO 1 UNITS: 100 INJECTION, SOLUTION INTRAVENOUS; SUBCUTANEOUS at 17:26

## 2025-02-20 RX ADMIN — ATORVASTATIN CALCIUM 40 MG: 40 TABLET, FILM COATED ORAL at 20:40

## 2025-02-20 RX ADMIN — SODIUM CHLORIDE, PRESERVATIVE FREE 10 ML: 5 INJECTION INTRAVENOUS at 09:01

## 2025-02-20 RX ADMIN — AMLODIPINE BESYLATE 2.5 MG: 5 TABLET ORAL at 20:40

## 2025-02-20 RX ADMIN — ASPIRIN 81 MG CHEWABLE TABLET 81 MG: 81 TABLET CHEWABLE at 09:00

## 2025-02-20 RX ADMIN — APIXABAN 5 MG: 5 TABLET, FILM COATED ORAL at 09:00

## 2025-02-20 RX ADMIN — INSULIN GLARGINE 5 UNITS: 100 INJECTION, SOLUTION SUBCUTANEOUS at 20:42

## 2025-02-20 RX ADMIN — SERTRALINE HYDROCHLORIDE 25 MG: 50 TABLET ORAL at 09:00

## 2025-02-20 RX ADMIN — TAMSULOSIN HYDROCHLORIDE 0.4 MG: 0.4 CAPSULE ORAL at 09:00

## 2025-02-20 RX ADMIN — INSULIN LISPRO 1 UNITS: 100 INJECTION, SOLUTION INTRAVENOUS; SUBCUTANEOUS at 09:00

## 2025-02-20 RX ADMIN — ALBUTEROL SULFATE 2.5 MG: 2.5 SOLUTION RESPIRATORY (INHALATION) at 08:13

## 2025-02-20 RX ADMIN — MEMANTINE HYDROCHLORIDE 10 MG: 10 TABLET, FILM COATED ORAL at 09:00

## 2025-02-20 RX ADMIN — Medication 5000 UNITS: at 09:00

## 2025-02-20 RX ADMIN — MIRTAZAPINE 15 MG: 15 TABLET, FILM COATED ORAL at 20:39

## 2025-02-20 RX ADMIN — ALBUTEROL SULFATE 2.5 MG: 2.5 SOLUTION RESPIRATORY (INHALATION) at 16:25

## 2025-02-20 RX ADMIN — APIXABAN 5 MG: 5 TABLET, FILM COATED ORAL at 20:40

## 2025-02-20 RX ADMIN — FINASTERIDE 5 MG: 5 TABLET, FILM COATED ORAL at 09:00

## 2025-02-20 RX ADMIN — SODIUM CHLORIDE, PRESERVATIVE FREE 10 ML: 5 INJECTION INTRAVENOUS at 20:40

## 2025-02-20 RX ADMIN — ALBUTEROL SULFATE 2.5 MG: 2.5 SOLUTION RESPIRATORY (INHALATION) at 19:19

## 2025-02-20 RX ADMIN — MEMANTINE HYDROCHLORIDE 10 MG: 10 TABLET, FILM COATED ORAL at 20:40

## 2025-02-20 RX ADMIN — ACETAMINOPHEN 650 MG: 325 TABLET ORAL at 20:39

## 2025-02-20 ASSESSMENT — PAIN SCALES - GENERAL
PAINLEVEL_OUTOF10: 0
PAINLEVEL_OUTOF10: 4
PAINLEVEL_OUTOF10: 2

## 2025-02-20 ASSESSMENT — PAIN DESCRIPTION - DESCRIPTORS
DESCRIPTORS: ACHING
DESCRIPTORS: DULL;ACHING

## 2025-02-20 ASSESSMENT — PAIN DESCRIPTION - LOCATION
LOCATION: GENERALIZED
LOCATION: HAND

## 2025-02-20 ASSESSMENT — PAIN - FUNCTIONAL ASSESSMENT: PAIN_FUNCTIONAL_ASSESSMENT: ACTIVITIES ARE NOT PREVENTED

## 2025-02-20 ASSESSMENT — PAIN DESCRIPTION - ORIENTATION: ORIENTATION: RIGHT;LEFT

## 2025-02-20 NOTE — CARE COORDINATION
CM reviewed chart & discussed in IDR. Pt may possibly be discharged to [facility] tomorrow. CM called Judi with [facility] and left a secure VM informing Judi of the potential discharge tomorrow and informing her that pt will need a bi-pap machine.

## 2025-02-21 ENCOUNTER — APPOINTMENT (OUTPATIENT)
Dept: GENERAL RADIOLOGY | Age: 80
DRG: 189 | End: 2025-02-21
Payer: MEDICARE

## 2025-02-21 ENCOUNTER — APPOINTMENT (OUTPATIENT)
Dept: NON INVASIVE DIAGNOSTICS | Age: 80
DRG: 189 | End: 2025-02-21
Attending: HOSPITALIST
Payer: MEDICARE

## 2025-02-21 LAB
ANION GAP SERPL CALCULATED.3IONS-SCNC: 9 MMOL/L (ref 9–17)
ARTERIAL PATENCY WRIST A: ABNORMAL
BODY TEMPERATURE: 37
BUN SERPL-MCNC: 4 MG/DL (ref 7–20)
CALCIUM SERPL-MCNC: 9.7 MG/DL (ref 8.3–10.6)
CHLORIDE SERPL-SCNC: 98 MMOL/L (ref 99–110)
CO2 SERPL-SCNC: 31 MMOL/L (ref 21–32)
COHGB MFR BLD: 2.5 % (ref 0.5–1.5)
CREAT SERPL-MCNC: 0.5 MG/DL (ref 0.8–1.3)
ECHO AO ASC DIAM: 3.5 CM
ECHO AO ASCENDING AORTA INDEX: 1.93 CM/M2
ECHO AO ROOT DIAM: 3.1 CM
ECHO AO ROOT INDEX: 1.71 CM/M2
ECHO AV AREA PEAK VELOCITY: 2.4 CM2
ECHO AV AREA VTI: 2.9 CM2
ECHO AV AREA/BSA PEAK VELOCITY: 1.3 CM2/M2
ECHO AV AREA/BSA VTI: 1.6 CM2/M2
ECHO AV MEAN GRADIENT: 2 MMHG
ECHO AV MEAN VELOCITY: 0.7 M/S
ECHO AV PEAK GRADIENT: 3 MMHG
ECHO AV PEAK VELOCITY: 0.9 M/S
ECHO AV VELOCITY RATIO: 0.78
ECHO AV VTI: 12.2 CM
ECHO BSA: 1.81 M2
ECHO LA DIAMETER INDEX: 2.27 CM/M2
ECHO LA DIAMETER: 4.1 CM
ECHO LA TO AORTIC ROOT RATIO: 1.32
ECHO LV EDV A4C: 44 ML
ECHO LV EDV INDEX A4C: 24 ML/M2
ECHO LV EF PHYSICIAN: 50 %
ECHO LV EJECTION FRACTION A4C: 58 %
ECHO LV ESV A4C: 18 ML
ECHO LV ESV INDEX A4C: 10 ML/M2
ECHO LV FRACTIONAL SHORTENING: 30 % (ref 28–44)
ECHO LV INTERNAL DIMENSION DIASTOLE INDEX: 2.21 CM/M2
ECHO LV INTERNAL DIMENSION DIASTOLIC: 4 CM (ref 4.2–5.9)
ECHO LV INTERNAL DIMENSION SYSTOLIC INDEX: 1.55 CM/M2
ECHO LV INTERNAL DIMENSION SYSTOLIC: 2.8 CM
ECHO LV IVSD: 0.8 CM (ref 0.6–1)
ECHO LV MASS 2D: 109.7 G (ref 88–224)
ECHO LV MASS INDEX 2D: 60.6 G/M2 (ref 49–115)
ECHO LV POSTERIOR WALL DIASTOLIC: 1 CM (ref 0.6–1)
ECHO LV RELATIVE WALL THICKNESS RATIO: 0.5
ECHO LVOT AREA: 3.1 CM2
ECHO LVOT AV VTI INDEX: 0.93
ECHO LVOT DIAM: 2 CM
ECHO LVOT MEAN GRADIENT: 1 MMHG
ECHO LVOT PEAK GRADIENT: 2 MMHG
ECHO LVOT PEAK VELOCITY: 0.7 M/S
ECHO LVOT STROKE VOLUME INDEX: 19.6 ML/M2
ECHO LVOT SV: 35.5 ML
ECHO LVOT VTI: 11.3 CM
ECHO MV A VELOCITY: 0.76 M/S
ECHO MV E DECELERATION TIME (DT): 116 MS
ECHO MV E VELOCITY: 0.37 M/S
ECHO MV E/A RATIO: 0.49
EKG ATRIAL RATE: 101 BPM
EKG DIAGNOSIS: NORMAL
EKG P AXIS: 16 DEGREES
EKG P-R INTERVAL: 168 MS
EKG Q-T INTERVAL: 306 MS
EKG QRS DURATION: 76 MS
EKG QTC CALCULATION (BAZETT): 396 MS
EKG R AXIS: -42 DEGREES
EKG T AXIS: -35 DEGREES
EKG VENTRICULAR RATE: 101 BPM
GFR, ESTIMATED: >90 ML/MIN/1.73M2
GLUCOSE BLD-MCNC: 140 MG/DL (ref 74–99)
GLUCOSE BLD-MCNC: 148 MG/DL (ref 74–99)
GLUCOSE BLD-MCNC: 153 MG/DL (ref 74–99)
GLUCOSE BLD-MCNC: 194 MG/DL (ref 74–99)
GLUCOSE BLD-MCNC: 308 MG/DL (ref 74–99)
GLUCOSE SERPL-MCNC: 190 MG/DL (ref 74–99)
HCO3 VENOUS: 32 MMOL/L (ref 22–29)
MAGNESIUM SERPL-MCNC: 1.9 MG/DL (ref 1.8–2.4)
METHEMOGLOBIN: 0.3 % (ref 0.5–1.5)
OXYHGB MFR BLD: 35.6 %
PCO2 VENOUS: 57.1 MM HG (ref 38–54)
PH VENOUS: 7.37 (ref 7.32–7.43)
PO2 VENOUS: 22.5 MM HG (ref 23–48)
POSITIVE BASE EXCESS, VEN: 5.2 MMOL/L (ref 0–3)
POTASSIUM SERPL-SCNC: 3.6 MMOL/L (ref 3.5–5.1)
SODIUM SERPL-SCNC: 137 MMOL/L (ref 136–145)

## 2025-02-21 PROCEDURE — 6370000000 HC RX 637 (ALT 250 FOR IP): Performed by: HOSPITALIST

## 2025-02-21 PROCEDURE — 2700000000 HC OXYGEN THERAPY PER DAY

## 2025-02-21 PROCEDURE — 6370000000 HC RX 637 (ALT 250 FOR IP): Performed by: INTERNAL MEDICINE

## 2025-02-21 PROCEDURE — 82962 GLUCOSE BLOOD TEST: CPT

## 2025-02-21 PROCEDURE — 97530 THERAPEUTIC ACTIVITIES: CPT

## 2025-02-21 PROCEDURE — 93010 ELECTROCARDIOGRAM REPORT: CPT | Performed by: INTERNAL MEDICINE

## 2025-02-21 PROCEDURE — 82805 BLOOD GASES W/O2 SATURATION: CPT

## 2025-02-21 PROCEDURE — 2580000003 HC RX 258: Performed by: HOSPITALIST

## 2025-02-21 PROCEDURE — 6370000000 HC RX 637 (ALT 250 FOR IP): Performed by: NURSE PRACTITIONER

## 2025-02-21 PROCEDURE — 6360000002 HC RX W HCPCS: Performed by: HOSPITALIST

## 2025-02-21 PROCEDURE — 92526 ORAL FUNCTION THERAPY: CPT

## 2025-02-21 PROCEDURE — 93306 TTE W/DOPPLER COMPLETE: CPT | Performed by: INTERNAL MEDICINE

## 2025-02-21 PROCEDURE — 80048 BASIC METABOLIC PNL TOTAL CA: CPT

## 2025-02-21 PROCEDURE — 94660 CPAP INITIATION&MGMT: CPT

## 2025-02-21 PROCEDURE — 93005 ELECTROCARDIOGRAM TRACING: CPT | Performed by: HOSPITALIST

## 2025-02-21 PROCEDURE — 94640 AIRWAY INHALATION TREATMENT: CPT

## 2025-02-21 PROCEDURE — 83735 ASSAY OF MAGNESIUM: CPT

## 2025-02-21 PROCEDURE — 2500000003 HC RX 250 WO HCPCS: Performed by: INTERNAL MEDICINE

## 2025-02-21 PROCEDURE — 93306 TTE W/DOPPLER COMPLETE: CPT

## 2025-02-21 PROCEDURE — 71045 X-RAY EXAM CHEST 1 VIEW: CPT

## 2025-02-21 PROCEDURE — 97163 PT EVAL HIGH COMPLEX 45 MIN: CPT

## 2025-02-21 PROCEDURE — 1200000000 HC SEMI PRIVATE

## 2025-02-21 PROCEDURE — 2580000003 HC RX 258: Performed by: INTERNAL MEDICINE

## 2025-02-21 PROCEDURE — 6360000002 HC RX W HCPCS: Performed by: INTERNAL MEDICINE

## 2025-02-21 PROCEDURE — 94761 N-INVAS EAR/PLS OXIMETRY MLT: CPT

## 2025-02-21 PROCEDURE — 36415 COLL VENOUS BLD VENIPUNCTURE: CPT

## 2025-02-21 RX ORDER — METOPROLOL TARTRATE 25 MG/1
12.5 TABLET, FILM COATED ORAL 2 TIMES DAILY
Status: DISCONTINUED | OUTPATIENT
Start: 2025-02-21 | End: 2025-02-26 | Stop reason: HOSPADM

## 2025-02-21 RX ADMIN — INSULIN LISPRO 3 UNITS: 100 INJECTION, SOLUTION INTRAVENOUS; SUBCUTANEOUS at 12:01

## 2025-02-21 RX ADMIN — APIXABAN 5 MG: 5 TABLET, FILM COATED ORAL at 08:42

## 2025-02-21 RX ADMIN — METOPROLOL TARTRATE 12.5 MG: 25 TABLET, FILM COATED ORAL at 21:54

## 2025-02-21 RX ADMIN — ALBUTEROL SULFATE 2.5 MG: 2.5 SOLUTION RESPIRATORY (INHALATION) at 08:18

## 2025-02-21 RX ADMIN — IPRATROPIUM BROMIDE AND ALBUTEROL SULFATE 1 DOSE: .5; 3 SOLUTION RESPIRATORY (INHALATION) at 20:41

## 2025-02-21 RX ADMIN — TAMSULOSIN HYDROCHLORIDE 0.4 MG: 0.4 CAPSULE ORAL at 08:42

## 2025-02-21 RX ADMIN — FINASTERIDE 5 MG: 5 TABLET, FILM COATED ORAL at 08:42

## 2025-02-21 RX ADMIN — APIXABAN 5 MG: 5 TABLET, FILM COATED ORAL at 21:54

## 2025-02-21 RX ADMIN — SERTRALINE HYDROCHLORIDE 25 MG: 50 TABLET ORAL at 08:42

## 2025-02-21 RX ADMIN — AMLODIPINE BESYLATE 2.5 MG: 5 TABLET ORAL at 21:54

## 2025-02-21 RX ADMIN — ALBUTEROL SULFATE 2.5 MG: 2.5 SOLUTION RESPIRATORY (INHALATION) at 15:40

## 2025-02-21 RX ADMIN — ASPIRIN 81 MG CHEWABLE TABLET 81 MG: 81 TABLET CHEWABLE at 08:42

## 2025-02-21 RX ADMIN — Medication 5000 UNITS: at 08:42

## 2025-02-21 RX ADMIN — SODIUM CHLORIDE 3000 MG: 900 INJECTION INTRAVENOUS at 22:00

## 2025-02-21 RX ADMIN — MEMANTINE HYDROCHLORIDE 10 MG: 10 TABLET, FILM COATED ORAL at 21:54

## 2025-02-21 RX ADMIN — MIRTAZAPINE 15 MG: 15 TABLET, FILM COATED ORAL at 21:54

## 2025-02-21 RX ADMIN — ATORVASTATIN CALCIUM 40 MG: 40 TABLET, FILM COATED ORAL at 21:54

## 2025-02-21 RX ADMIN — SODIUM CHLORIDE, PRESERVATIVE FREE 10 ML: 5 INJECTION INTRAVENOUS at 23:55

## 2025-02-21 RX ADMIN — MEMANTINE HYDROCHLORIDE 10 MG: 10 TABLET, FILM COATED ORAL at 08:42

## 2025-02-21 RX ADMIN — SODIUM CHLORIDE, PRESERVATIVE FREE 10 ML: 5 INJECTION INTRAVENOUS at 08:48

## 2025-02-21 RX ADMIN — INSULIN GLARGINE 5 UNITS: 100 INJECTION, SOLUTION SUBCUTANEOUS at 23:34

## 2025-02-21 RX ADMIN — ALBUTEROL SULFATE 2.5 MG: 2.5 SOLUTION RESPIRATORY (INHALATION) at 20:44

## 2025-02-21 RX ADMIN — SODIUM CHLORIDE: 9 INJECTION, SOLUTION INTRAVENOUS at 22:00

## 2025-02-21 NOTE — SIGNIFICANT EVENT
Notified by nurse that patient has been more somnolent today. He was placed on bipap. Blood gases do not show significantly elevated PCO2.Patient does appear to respond to pain, but is difficult to arouse. He is hard of hearing at baseline.  Patient had similar presentation on 2/18. Vital signs are stable. Will check lab work for any metabolic causes. Continue to monitor.

## 2025-02-21 NOTE — CARE COORDINATION
If pt is discharged over the weekend please call [phone] to inform them of the discharge.    No pre-cert needed.    [facility] [place]    Call report to [phone]    Complete and sign MARITZA then   Fax AVS to [phone]    Place AVS and any scripts in the packet to go with pt to the facility.    Cousin/decision maker notified of discharge      [phone]    Packet at NS    DISCHARGE CANCELED FOR TODAY    [facility] made aware  Chanda Guerrier & Seamus RN made aware.

## 2025-02-22 LAB
ALBUMIN SERPL-MCNC: 3.4 G/DL (ref 3.4–5)
ALBUMIN/GLOB SERPL: 1.2 {RATIO} (ref 1.1–2.2)
ALP SERPL-CCNC: 84 U/L (ref 40–129)
ALT SERPL-CCNC: 22 U/L (ref 10–40)
AMMONIA PLAS-SCNC: 22 UMOL/L (ref 16–60)
ANION GAP SERPL CALCULATED.3IONS-SCNC: 6 MMOL/L (ref 9–17)
ARTERIAL PATENCY WRIST A: ABNORMAL
AST SERPL-CCNC: 27 U/L (ref 15–37)
BASOPHILS # BLD: 0.02 K/UL
BASOPHILS NFR BLD: 0 % (ref 0–1)
BILIRUB DIRECT SERPL-MCNC: 0.9 MG/DL (ref 0–0.3)
BILIRUB INDIRECT SERPL-MCNC: 0.7 MG/DL (ref 0–0.7)
BILIRUB SERPL-MCNC: 1.6 MG/DL (ref 0–1)
BNP SERPL-MCNC: 150 PG/ML (ref 0–450)
BODY TEMPERATURE: 37
BUN SERPL-MCNC: 6 MG/DL (ref 7–20)
CALCIUM SERPL-MCNC: 9.4 MG/DL (ref 8.3–10.6)
CHLORIDE SERPL-SCNC: 98 MMOL/L (ref 99–110)
CO2 SERPL-SCNC: 34 MMOL/L (ref 21–32)
COHGB MFR BLD: 1.7 % (ref 0.5–1.5)
CREAT SERPL-MCNC: 0.5 MG/DL (ref 0.8–1.3)
EOSINOPHIL # BLD: 0.17 K/UL
EOSINOPHILS RELATIVE PERCENT: 3 % (ref 0–3)
ERYTHROCYTE [DISTWIDTH] IN BLOOD BY AUTOMATED COUNT: 17.3 % (ref 11.7–14.9)
FOLATE SERPL-MCNC: 9.8 NG/ML (ref 4.8–24.2)
GFR, ESTIMATED: >90 ML/MIN/1.73M2
GLUCOSE BLD-MCNC: 154 MG/DL (ref 74–99)
GLUCOSE BLD-MCNC: 172 MG/DL (ref 74–99)
GLUCOSE BLD-MCNC: 172 MG/DL (ref 74–99)
GLUCOSE BLD-MCNC: 210 MG/DL (ref 74–99)
GLUCOSE SERPL-MCNC: 184 MG/DL (ref 74–99)
HCO3 VENOUS: 31.3 MMOL/L (ref 22–29)
HCT VFR BLD AUTO: 30 % (ref 42–52)
HGB BLD-MCNC: 11.1 G/DL (ref 13.5–18)
IMM GRANULOCYTES # BLD AUTO: 0.05 K/UL
IMM GRANULOCYTES NFR BLD: 1 %
LYMPHOCYTES NFR BLD: 0.88 K/UL
LYMPHOCYTES RELATIVE PERCENT: 14 % (ref 24–44)
MCH RBC QN AUTO: 41 PG (ref 27–31)
MCHC RBC AUTO-ENTMCNC: 37 G/DL (ref 32–36)
MCV RBC AUTO: 110.7 FL (ref 78–100)
METHEMOGLOBIN: 0.3 % (ref 0.5–1.5)
MONOCYTES NFR BLD: 0.23 K/UL
MONOCYTES NFR BLD: 4 % (ref 0–4)
NEUTROPHILS NFR BLD: 79 % (ref 36–66)
NEUTS SEG NFR BLD: 4.93 K/UL
OXYHGB MFR BLD: 62.4 %
PCO2 VENOUS: 67 MM HG (ref 38–54)
PH VENOUS: 7.29 (ref 7.32–7.43)
PLATELET # BLD AUTO: 250 K/UL (ref 140–440)
PMV BLD AUTO: 10.5 FL (ref 7.5–11.1)
PO2 VENOUS: 38.5 MM HG (ref 23–48)
POSITIVE BASE EXCESS, VEN: 3.2 MMOL/L (ref 0–3)
POTASSIUM SERPL-SCNC: 3.7 MMOL/L (ref 3.5–5.1)
PROCALCITONIN SERPL-MCNC: 0.09 NG/ML
PROT SERPL-MCNC: 6.2 G/DL (ref 6.4–8.2)
RBC # BLD AUTO: 2.71 M/UL (ref 4.6–6.2)
SODIUM SERPL-SCNC: 139 MMOL/L (ref 136–145)
TEXT FOR RESPIRATORY: ABNORMAL
VIT B12 SERPL-MCNC: 785 PG/ML (ref 211–911)
WBC OTHER # BLD: 6.3 K/UL (ref 4–10.5)

## 2025-02-22 PROCEDURE — 82805 BLOOD GASES W/O2 SATURATION: CPT

## 2025-02-22 PROCEDURE — 85025 COMPLETE CBC W/AUTO DIFF WBC: CPT

## 2025-02-22 PROCEDURE — 6360000002 HC RX W HCPCS: Performed by: INTERNAL MEDICINE

## 2025-02-22 PROCEDURE — 2700000000 HC OXYGEN THERAPY PER DAY

## 2025-02-22 PROCEDURE — 83880 ASSAY OF NATRIURETIC PEPTIDE: CPT

## 2025-02-22 PROCEDURE — 36415 COLL VENOUS BLD VENIPUNCTURE: CPT

## 2025-02-22 PROCEDURE — 82248 BILIRUBIN DIRECT: CPT

## 2025-02-22 PROCEDURE — 80053 COMPREHEN METABOLIC PANEL: CPT

## 2025-02-22 PROCEDURE — 2580000003 HC RX 258: Performed by: INTERNAL MEDICINE

## 2025-02-22 PROCEDURE — 94640 AIRWAY INHALATION TREATMENT: CPT

## 2025-02-22 PROCEDURE — 2580000003 HC RX 258: Performed by: HOSPITALIST

## 2025-02-22 PROCEDURE — 84145 PROCALCITONIN (PCT): CPT

## 2025-02-22 PROCEDURE — 6360000002 HC RX W HCPCS: Performed by: HOSPITALIST

## 2025-02-22 PROCEDURE — 6370000000 HC RX 637 (ALT 250 FOR IP): Performed by: NURSE PRACTITIONER

## 2025-02-22 PROCEDURE — 2500000003 HC RX 250 WO HCPCS: Performed by: INTERNAL MEDICINE

## 2025-02-22 PROCEDURE — 6370000000 HC RX 637 (ALT 250 FOR IP): Performed by: INTERNAL MEDICINE

## 2025-02-22 PROCEDURE — 94761 N-INVAS EAR/PLS OXIMETRY MLT: CPT

## 2025-02-22 PROCEDURE — 82140 ASSAY OF AMMONIA: CPT

## 2025-02-22 PROCEDURE — 82962 GLUCOSE BLOOD TEST: CPT

## 2025-02-22 PROCEDURE — 6370000000 HC RX 637 (ALT 250 FOR IP): Performed by: HOSPITALIST

## 2025-02-22 PROCEDURE — 1200000000 HC SEMI PRIVATE

## 2025-02-22 PROCEDURE — 82607 VITAMIN B-12: CPT

## 2025-02-22 PROCEDURE — 82746 ASSAY OF FOLIC ACID SERUM: CPT

## 2025-02-22 PROCEDURE — 94660 CPAP INITIATION&MGMT: CPT

## 2025-02-22 RX ADMIN — SODIUM CHLORIDE 3000 MG: 900 INJECTION INTRAVENOUS at 04:31

## 2025-02-22 RX ADMIN — SODIUM CHLORIDE 3000 MG: 900 INJECTION INTRAVENOUS at 08:35

## 2025-02-22 RX ADMIN — ALBUTEROL SULFATE 2.5 MG: 2.5 SOLUTION RESPIRATORY (INHALATION) at 19:55

## 2025-02-22 RX ADMIN — MEMANTINE HYDROCHLORIDE 10 MG: 10 TABLET, FILM COATED ORAL at 23:20

## 2025-02-22 RX ADMIN — MIRTAZAPINE 15 MG: 15 TABLET, FILM COATED ORAL at 23:20

## 2025-02-22 RX ADMIN — APIXABAN 5 MG: 5 TABLET, FILM COATED ORAL at 23:20

## 2025-02-22 RX ADMIN — SODIUM CHLORIDE, PRESERVATIVE FREE 10 ML: 5 INJECTION INTRAVENOUS at 08:25

## 2025-02-22 RX ADMIN — SODIUM CHLORIDE 3000 MG: 900 INJECTION INTRAVENOUS at 23:38

## 2025-02-22 RX ADMIN — SODIUM CHLORIDE 3000 MG: 900 INJECTION INTRAVENOUS at 14:54

## 2025-02-22 RX ADMIN — INSULIN LISPRO 1 UNITS: 100 INJECTION, SOLUTION INTRAVENOUS; SUBCUTANEOUS at 08:26

## 2025-02-22 RX ADMIN — METOPROLOL TARTRATE 12.5 MG: 25 TABLET, FILM COATED ORAL at 23:20

## 2025-02-22 RX ADMIN — INSULIN GLARGINE 5 UNITS: 100 INJECTION, SOLUTION SUBCUTANEOUS at 23:21

## 2025-02-22 RX ADMIN — SODIUM CHLORIDE, PRESERVATIVE FREE 10 ML: 5 INJECTION INTRAVENOUS at 23:21

## 2025-02-22 RX ADMIN — SODIUM CHLORIDE: 9 INJECTION, SOLUTION INTRAVENOUS at 04:31

## 2025-02-22 RX ADMIN — ALBUTEROL SULFATE 2.5 MG: 2.5 SOLUTION RESPIRATORY (INHALATION) at 07:49

## 2025-02-22 RX ADMIN — SODIUM CHLORIDE: 9 INJECTION, SOLUTION INTRAVENOUS at 23:37

## 2025-02-22 RX ADMIN — AMLODIPINE BESYLATE 2.5 MG: 5 TABLET ORAL at 23:31

## 2025-02-22 RX ADMIN — ATORVASTATIN CALCIUM 40 MG: 40 TABLET, FILM COATED ORAL at 23:31

## 2025-02-23 LAB
AMPHET UR QL SCN: NEGATIVE
BARBITURATES UR QL SCN: NEGATIVE
BENZODIAZ UR QL: NEGATIVE
BILIRUB UR QL STRIP: NEGATIVE
CANNABINOIDS UR QL SCN: NEGATIVE
CLARITY UR: CLEAR
COCAINE UR QL SCN: NEGATIVE
COLOR UR: YELLOW
COMMENT: ABNORMAL
FENTANYL UR QL: NEGATIVE
GLUCOSE BLD-MCNC: 148 MG/DL (ref 74–99)
GLUCOSE BLD-MCNC: 160 MG/DL (ref 74–99)
GLUCOSE BLD-MCNC: 193 MG/DL (ref 74–99)
GLUCOSE BLD-MCNC: 230 MG/DL (ref 74–99)
GLUCOSE UR STRIP-MCNC: NEGATIVE MG/DL
HGB UR QL STRIP.AUTO: NEGATIVE
KETONES UR STRIP-MCNC: NEGATIVE MG/DL
LEUKOCYTE ESTERASE UR QL STRIP: NEGATIVE
NITRITE UR QL STRIP: NEGATIVE
OPIATES UR QL SCN: NEGATIVE
OXYCODONE UR QL SCN: NEGATIVE
PH UR STRIP: 6 [PH] (ref 5–8)
PROT UR STRIP-MCNC: NEGATIVE MG/DL
SP GR UR STRIP: <1.005 (ref 1–1.03)
TEST INFORMATION: NORMAL
UROBILINOGEN UR STRIP-ACNC: 0.2 EU/DL (ref 0–1)

## 2025-02-23 PROCEDURE — 6370000000 HC RX 637 (ALT 250 FOR IP): Performed by: HOSPITALIST

## 2025-02-23 PROCEDURE — 6370000000 HC RX 637 (ALT 250 FOR IP): Performed by: NURSE PRACTITIONER

## 2025-02-23 PROCEDURE — 82962 GLUCOSE BLOOD TEST: CPT

## 2025-02-23 PROCEDURE — 2580000003 HC RX 258: Performed by: HOSPITALIST

## 2025-02-23 PROCEDURE — 2580000003 HC RX 258: Performed by: INTERNAL MEDICINE

## 2025-02-23 PROCEDURE — 2700000000 HC OXYGEN THERAPY PER DAY

## 2025-02-23 PROCEDURE — 94761 N-INVAS EAR/PLS OXIMETRY MLT: CPT

## 2025-02-23 PROCEDURE — 80307 DRUG TEST PRSMV CHEM ANLYZR: CPT

## 2025-02-23 PROCEDURE — 6360000002 HC RX W HCPCS: Performed by: INTERNAL MEDICINE

## 2025-02-23 PROCEDURE — 2500000003 HC RX 250 WO HCPCS: Performed by: INTERNAL MEDICINE

## 2025-02-23 PROCEDURE — 6360000002 HC RX W HCPCS: Performed by: HOSPITALIST

## 2025-02-23 PROCEDURE — 6370000000 HC RX 637 (ALT 250 FOR IP): Performed by: INTERNAL MEDICINE

## 2025-02-23 PROCEDURE — 94640 AIRWAY INHALATION TREATMENT: CPT

## 2025-02-23 PROCEDURE — 1200000000 HC SEMI PRIVATE

## 2025-02-23 PROCEDURE — 81003 URINALYSIS AUTO W/O SCOPE: CPT

## 2025-02-23 PROCEDURE — 94660 CPAP INITIATION&MGMT: CPT

## 2025-02-23 RX ADMIN — MEMANTINE HYDROCHLORIDE 10 MG: 10 TABLET, FILM COATED ORAL at 09:47

## 2025-02-23 RX ADMIN — SODIUM CHLORIDE, PRESERVATIVE FREE 10 ML: 5 INJECTION INTRAVENOUS at 09:47

## 2025-02-23 RX ADMIN — SODIUM CHLORIDE 3000 MG: 900 INJECTION INTRAVENOUS at 23:07

## 2025-02-23 RX ADMIN — FINASTERIDE 5 MG: 5 TABLET, FILM COATED ORAL at 09:47

## 2025-02-23 RX ADMIN — SODIUM CHLORIDE: 9 INJECTION, SOLUTION INTRAVENOUS at 23:02

## 2025-02-23 RX ADMIN — SODIUM CHLORIDE, PRESERVATIVE FREE 10 ML: 5 INJECTION INTRAVENOUS at 22:59

## 2025-02-23 RX ADMIN — METOPROLOL TARTRATE 12.5 MG: 25 TABLET, FILM COATED ORAL at 22:57

## 2025-02-23 RX ADMIN — MEMANTINE HYDROCHLORIDE 10 MG: 10 TABLET, FILM COATED ORAL at 22:58

## 2025-02-23 RX ADMIN — Medication 5000 UNITS: at 09:48

## 2025-02-23 RX ADMIN — APIXABAN 5 MG: 5 TABLET, FILM COATED ORAL at 22:58

## 2025-02-23 RX ADMIN — ALBUTEROL SULFATE 2.5 MG: 2.5 SOLUTION RESPIRATORY (INHALATION) at 19:48

## 2025-02-23 RX ADMIN — SERTRALINE HYDROCHLORIDE 25 MG: 50 TABLET ORAL at 09:48

## 2025-02-23 RX ADMIN — APIXABAN 5 MG: 5 TABLET, FILM COATED ORAL at 09:47

## 2025-02-23 RX ADMIN — ASPIRIN 81 MG CHEWABLE TABLET 81 MG: 81 TABLET CHEWABLE at 09:47

## 2025-02-23 RX ADMIN — TAMSULOSIN HYDROCHLORIDE 0.4 MG: 0.4 CAPSULE ORAL at 09:48

## 2025-02-23 RX ADMIN — METOPROLOL TARTRATE 12.5 MG: 25 TABLET, FILM COATED ORAL at 09:48

## 2025-02-23 RX ADMIN — ATORVASTATIN CALCIUM 40 MG: 40 TABLET, FILM COATED ORAL at 22:58

## 2025-02-23 RX ADMIN — INSULIN LISPRO 1 UNITS: 100 INJECTION, SOLUTION INTRAVENOUS; SUBCUTANEOUS at 22:58

## 2025-02-23 RX ADMIN — AMLODIPINE BESYLATE 2.5 MG: 5 TABLET ORAL at 22:58

## 2025-02-23 RX ADMIN — SODIUM CHLORIDE 3000 MG: 900 INJECTION INTRAVENOUS at 17:04

## 2025-02-23 RX ADMIN — INSULIN GLARGINE 5 UNITS: 100 INJECTION, SOLUTION SUBCUTANEOUS at 22:58

## 2025-02-23 RX ADMIN — SODIUM CHLORIDE 3000 MG: 900 INJECTION INTRAVENOUS at 09:57

## 2025-02-23 RX ADMIN — MIRTAZAPINE 15 MG: 15 TABLET, FILM COATED ORAL at 22:58

## 2025-02-23 RX ADMIN — INSULIN LISPRO 1 UNITS: 100 INJECTION, SOLUTION INTRAVENOUS; SUBCUTANEOUS at 12:53

## 2025-02-23 RX ADMIN — SODIUM CHLORIDE 3000 MG: 900 INJECTION INTRAVENOUS at 03:45

## 2025-02-24 LAB
ARTERIAL PATENCY WRIST A: ABNORMAL
BODY TEMPERATURE: 37
COHGB MFR BLD: 0.9 % (ref 0.5–1.5)
GLUCOSE BLD-MCNC: 112 MG/DL (ref 74–99)
GLUCOSE BLD-MCNC: 138 MG/DL (ref 74–99)
GLUCOSE BLD-MCNC: 149 MG/DL (ref 74–99)
HCO3 VENOUS: 34.9 MMOL/L (ref 22–29)
METHEMOGLOBIN: 0.5 % (ref 0.5–1.5)
OXYHGB MFR BLD: 85 %
PCO2 VENOUS: 66.7 MM HG (ref 38–54)
PH VENOUS: 7.34 (ref 7.32–7.43)
PO2 VENOUS: 56.1 MM HG (ref 23–48)
POSITIVE BASE EXCESS, VEN: 7.2 MMOL/L (ref 0–3)
TEXT FOR RESPIRATORY: ABNORMAL

## 2025-02-24 PROCEDURE — 92526 ORAL FUNCTION THERAPY: CPT

## 2025-02-24 PROCEDURE — 94660 CPAP INITIATION&MGMT: CPT

## 2025-02-24 PROCEDURE — 2500000003 HC RX 250 WO HCPCS: Performed by: INTERNAL MEDICINE

## 2025-02-24 PROCEDURE — 82962 GLUCOSE BLOOD TEST: CPT

## 2025-02-24 PROCEDURE — 6370000000 HC RX 637 (ALT 250 FOR IP): Performed by: HOSPITALIST

## 2025-02-24 PROCEDURE — 94761 N-INVAS EAR/PLS OXIMETRY MLT: CPT

## 2025-02-24 PROCEDURE — 82805 BLOOD GASES W/O2 SATURATION: CPT

## 2025-02-24 PROCEDURE — 6370000000 HC RX 637 (ALT 250 FOR IP): Performed by: NURSE PRACTITIONER

## 2025-02-24 PROCEDURE — 2580000003 HC RX 258: Performed by: HOSPITALIST

## 2025-02-24 PROCEDURE — 6360000002 HC RX W HCPCS: Performed by: HOSPITALIST

## 2025-02-24 PROCEDURE — 6370000000 HC RX 637 (ALT 250 FOR IP): Performed by: INTERNAL MEDICINE

## 2025-02-24 PROCEDURE — 6360000002 HC RX W HCPCS: Performed by: INTERNAL MEDICINE

## 2025-02-24 PROCEDURE — 94640 AIRWAY INHALATION TREATMENT: CPT

## 2025-02-24 PROCEDURE — 1200000000 HC SEMI PRIVATE

## 2025-02-24 PROCEDURE — 2580000003 HC RX 258: Performed by: INTERNAL MEDICINE

## 2025-02-24 PROCEDURE — 36415 COLL VENOUS BLD VENIPUNCTURE: CPT

## 2025-02-24 PROCEDURE — 2700000000 HC OXYGEN THERAPY PER DAY

## 2025-02-24 RX ADMIN — SODIUM CHLORIDE, PRESERVATIVE FREE 10 ML: 5 INJECTION INTRAVENOUS at 21:25

## 2025-02-24 RX ADMIN — METOPROLOL TARTRATE 12.5 MG: 25 TABLET, FILM COATED ORAL at 09:31

## 2025-02-24 RX ADMIN — SODIUM CHLORIDE, PRESERVATIVE FREE 10 ML: 5 INJECTION INTRAVENOUS at 09:32

## 2025-02-24 RX ADMIN — ALBUTEROL SULFATE 2.5 MG: 2.5 SOLUTION RESPIRATORY (INHALATION) at 08:10

## 2025-02-24 RX ADMIN — FINASTERIDE 5 MG: 5 TABLET, FILM COATED ORAL at 09:30

## 2025-02-24 RX ADMIN — TAMSULOSIN HYDROCHLORIDE 0.4 MG: 0.4 CAPSULE ORAL at 09:33

## 2025-02-24 RX ADMIN — ASPIRIN 81 MG CHEWABLE TABLET 81 MG: 81 TABLET CHEWABLE at 09:29

## 2025-02-24 RX ADMIN — AMLODIPINE BESYLATE 2.5 MG: 5 TABLET ORAL at 21:25

## 2025-02-24 RX ADMIN — APIXABAN 5 MG: 5 TABLET, FILM COATED ORAL at 21:25

## 2025-02-24 RX ADMIN — MIRTAZAPINE 15 MG: 15 TABLET, FILM COATED ORAL at 21:25

## 2025-02-24 RX ADMIN — SODIUM CHLORIDE 3000 MG: 900 INJECTION INTRAVENOUS at 22:28

## 2025-02-24 RX ADMIN — INSULIN GLARGINE 5 UNITS: 100 INJECTION, SOLUTION SUBCUTANEOUS at 21:36

## 2025-02-24 RX ADMIN — Medication 5000 UNITS: at 09:33

## 2025-02-24 RX ADMIN — SODIUM CHLORIDE: 9 INJECTION, SOLUTION INTRAVENOUS at 04:03

## 2025-02-24 RX ADMIN — SERTRALINE HYDROCHLORIDE 25 MG: 50 TABLET ORAL at 09:32

## 2025-02-24 RX ADMIN — SODIUM CHLORIDE 3000 MG: 900 INJECTION INTRAVENOUS at 04:05

## 2025-02-24 RX ADMIN — ALBUTEROL SULFATE 2.5 MG: 2.5 SOLUTION RESPIRATORY (INHALATION) at 15:27

## 2025-02-24 RX ADMIN — SODIUM CHLORIDE 3000 MG: 900 INJECTION INTRAVENOUS at 16:15

## 2025-02-24 RX ADMIN — ATORVASTATIN CALCIUM 40 MG: 40 TABLET, FILM COATED ORAL at 21:25

## 2025-02-24 RX ADMIN — APIXABAN 5 MG: 5 TABLET, FILM COATED ORAL at 09:28

## 2025-02-24 RX ADMIN — SODIUM CHLORIDE 3000 MG: 900 INJECTION INTRAVENOUS at 09:27

## 2025-02-24 RX ADMIN — MEMANTINE HYDROCHLORIDE 10 MG: 10 TABLET, FILM COATED ORAL at 09:30

## 2025-02-24 RX ADMIN — MEMANTINE HYDROCHLORIDE 10 MG: 10 TABLET, FILM COATED ORAL at 21:25

## 2025-02-24 RX ADMIN — METOPROLOL TARTRATE 12.5 MG: 25 TABLET, FILM COATED ORAL at 21:24

## 2025-02-24 RX ADMIN — ALBUTEROL SULFATE 2.5 MG: 2.5 SOLUTION RESPIRATORY (INHALATION) at 21:02

## 2025-02-24 ASSESSMENT — PAIN SCALES - GENERAL
PAINLEVEL_OUTOF10: 8
PAINLEVEL_OUTOF10: 0

## 2025-02-24 ASSESSMENT — PAIN DESCRIPTION - ONSET: ONSET: ON-GOING

## 2025-02-24 ASSESSMENT — PAIN DESCRIPTION - ORIENTATION: ORIENTATION: LOWER

## 2025-02-24 ASSESSMENT — PAIN DESCRIPTION - LOCATION: LOCATION: BACK

## 2025-02-24 ASSESSMENT — PAIN DESCRIPTION - DESCRIPTORS: DESCRIPTORS: THROBBING

## 2025-02-24 ASSESSMENT — PAIN - FUNCTIONAL ASSESSMENT: PAIN_FUNCTIONAL_ASSESSMENT: PREVENTS OR INTERFERES SOME ACTIVE ACTIVITIES AND ADLS

## 2025-02-24 ASSESSMENT — PAIN SCALES - WONG BAKER: WONGBAKER_NUMERICALRESPONSE: HURTS WHOLE LOT

## 2025-02-24 ASSESSMENT — PAIN DESCRIPTION - FREQUENCY: FREQUENCY: CONTINUOUS

## 2025-02-24 ASSESSMENT — PAIN DESCRIPTION - PAIN TYPE: TYPE: CHRONIC PAIN

## 2025-02-24 NOTE — CARE COORDINATION
SORIN Castro, BSN, RN  Clinical Instructor for [facility] for student nurse care coordination & chart review.

## 2025-02-25 LAB
ANION GAP SERPL CALCULATED.3IONS-SCNC: 5 MMOL/L (ref 9–17)
ARTERIAL PATENCY WRIST A: ABNORMAL
BODY TEMPERATURE: 37
BUN SERPL-MCNC: 5 MG/DL (ref 7–20)
CALCIUM SERPL-MCNC: 8.5 MG/DL (ref 8.3–10.6)
CHLORIDE SERPL-SCNC: 100 MMOL/L (ref 99–110)
CO2 SERPL-SCNC: 34 MMOL/L (ref 21–32)
COHGB MFR BLD: 2.1 % (ref 0.5–1.5)
CREAT SERPL-MCNC: 0.2 MG/DL (ref 0.8–1.3)
ERYTHROCYTE [DISTWIDTH] IN BLOOD BY AUTOMATED COUNT: 15.2 % (ref 11.7–14.9)
GFR, ESTIMATED: >90 ML/MIN/1.73M2
GLUCOSE BLD-MCNC: 104 MG/DL (ref 74–99)
GLUCOSE BLD-MCNC: 106 MG/DL (ref 74–99)
GLUCOSE BLD-MCNC: 117 MG/DL (ref 74–99)
GLUCOSE BLD-MCNC: 143 MG/DL (ref 74–99)
GLUCOSE BLD-MCNC: 189 MG/DL (ref 74–99)
GLUCOSE SERPL-MCNC: 104 MG/DL (ref 74–99)
HCO3 VENOUS: 37 MMOL/L (ref 22–29)
HCT VFR BLD AUTO: 25.4 % (ref 42–52)
HGB BLD-MCNC: 9.2 G/DL (ref 13.5–18)
MAGNESIUM SERPL-MCNC: 1.8 MG/DL (ref 1.8–2.4)
MCH RBC QN AUTO: 40.5 PG (ref 27–31)
MCHC RBC AUTO-ENTMCNC: 36.2 G/DL (ref 32–36)
MCV RBC AUTO: 111.9 FL (ref 78–100)
METHEMOGLOBIN: 0.3 % (ref 0.5–1.5)
OXYHGB MFR BLD: 41.3 %
PCO2 VENOUS: 76.4 MM HG (ref 38–54)
PH VENOUS: 7.3 (ref 7.32–7.43)
PHOSPHATE SERPL-MCNC: 2.5 MG/DL (ref 2.5–4.9)
PLATELET # BLD AUTO: 201 K/UL (ref 140–440)
PMV BLD AUTO: 10.5 FL (ref 7.5–11.1)
PO2 VENOUS: 26.1 MM HG (ref 23–48)
POSITIVE BASE EXCESS, VEN: 8.3 MMOL/L (ref 0–3)
POTASSIUM SERPL-SCNC: 3.6 MMOL/L (ref 3.5–5.1)
RBC # BLD AUTO: 2.27 M/UL (ref 4.6–6.2)
SODIUM SERPL-SCNC: 139 MMOL/L (ref 136–145)
TEXT FOR RESPIRATORY: ABNORMAL
WBC OTHER # BLD: 4.3 K/UL (ref 4–10.5)

## 2025-02-25 PROCEDURE — 1200000000 HC SEMI PRIVATE

## 2025-02-25 PROCEDURE — 6360000002 HC RX W HCPCS: Performed by: INTERNAL MEDICINE

## 2025-02-25 PROCEDURE — 6360000002 HC RX W HCPCS: Performed by: HOSPITALIST

## 2025-02-25 PROCEDURE — 85027 COMPLETE CBC AUTOMATED: CPT

## 2025-02-25 PROCEDURE — 94660 CPAP INITIATION&MGMT: CPT

## 2025-02-25 PROCEDURE — 2700000000 HC OXYGEN THERAPY PER DAY

## 2025-02-25 PROCEDURE — 99223 1ST HOSP IP/OBS HIGH 75: CPT | Performed by: NURSE PRACTITIONER

## 2025-02-25 PROCEDURE — 80048 BASIC METABOLIC PNL TOTAL CA: CPT

## 2025-02-25 PROCEDURE — 2500000003 HC RX 250 WO HCPCS: Performed by: INTERNAL MEDICINE

## 2025-02-25 PROCEDURE — 94640 AIRWAY INHALATION TREATMENT: CPT

## 2025-02-25 PROCEDURE — 6370000000 HC RX 637 (ALT 250 FOR IP): Performed by: INTERNAL MEDICINE

## 2025-02-25 PROCEDURE — 82805 BLOOD GASES W/O2 SATURATION: CPT

## 2025-02-25 PROCEDURE — 84100 ASSAY OF PHOSPHORUS: CPT

## 2025-02-25 PROCEDURE — 6370000000 HC RX 637 (ALT 250 FOR IP): Performed by: NURSE PRACTITIONER

## 2025-02-25 PROCEDURE — 36415 COLL VENOUS BLD VENIPUNCTURE: CPT

## 2025-02-25 PROCEDURE — 83735 ASSAY OF MAGNESIUM: CPT

## 2025-02-25 PROCEDURE — 82962 GLUCOSE BLOOD TEST: CPT

## 2025-02-25 PROCEDURE — 94761 N-INVAS EAR/PLS OXIMETRY MLT: CPT

## 2025-02-25 PROCEDURE — 2580000003 HC RX 258: Performed by: HOSPITALIST

## 2025-02-25 PROCEDURE — 6370000000 HC RX 637 (ALT 250 FOR IP): Performed by: HOSPITALIST

## 2025-02-25 RX ADMIN — FINASTERIDE 5 MG: 5 TABLET, FILM COATED ORAL at 09:49

## 2025-02-25 RX ADMIN — SODIUM CHLORIDE 3000 MG: 900 INJECTION INTRAVENOUS at 03:30

## 2025-02-25 RX ADMIN — TAMSULOSIN HYDROCHLORIDE 0.4 MG: 0.4 CAPSULE ORAL at 09:49

## 2025-02-25 RX ADMIN — APIXABAN 5 MG: 5 TABLET, FILM COATED ORAL at 09:49

## 2025-02-25 RX ADMIN — AMLODIPINE BESYLATE 2.5 MG: 5 TABLET ORAL at 22:12

## 2025-02-25 RX ADMIN — MIRTAZAPINE 15 MG: 15 TABLET, FILM COATED ORAL at 22:13

## 2025-02-25 RX ADMIN — ASPIRIN 81 MG CHEWABLE TABLET 81 MG: 81 TABLET CHEWABLE at 09:49

## 2025-02-25 RX ADMIN — SODIUM CHLORIDE 3000 MG: 900 INJECTION INTRAVENOUS at 09:59

## 2025-02-25 RX ADMIN — METOPROLOL TARTRATE 12.5 MG: 25 TABLET, FILM COATED ORAL at 22:12

## 2025-02-25 RX ADMIN — AMPICILLIN SODIUM AND SULBACTAM SODIUM 3000 MG: 2; 1 INJECTION, POWDER, FOR SOLUTION INTRAMUSCULAR; INTRAVENOUS at 22:14

## 2025-02-25 RX ADMIN — APIXABAN 5 MG: 5 TABLET, FILM COATED ORAL at 22:12

## 2025-02-25 RX ADMIN — SODIUM CHLORIDE, PRESERVATIVE FREE 10 ML: 5 INJECTION INTRAVENOUS at 22:15

## 2025-02-25 RX ADMIN — ATORVASTATIN CALCIUM 40 MG: 40 TABLET, FILM COATED ORAL at 22:12

## 2025-02-25 RX ADMIN — METOPROLOL TARTRATE 12.5 MG: 25 TABLET, FILM COATED ORAL at 09:49

## 2025-02-25 RX ADMIN — INSULIN GLARGINE 5 UNITS: 100 INJECTION, SOLUTION SUBCUTANEOUS at 22:14

## 2025-02-25 RX ADMIN — SERTRALINE HYDROCHLORIDE 25 MG: 50 TABLET ORAL at 09:49

## 2025-02-25 RX ADMIN — Medication 5000 UNITS: at 09:48

## 2025-02-25 RX ADMIN — INSULIN LISPRO 1 UNITS: 100 INJECTION, SOLUTION INTRAVENOUS; SUBCUTANEOUS at 12:32

## 2025-02-25 RX ADMIN — ALBUTEROL SULFATE 2.5 MG: 2.5 SOLUTION RESPIRATORY (INHALATION) at 20:50

## 2025-02-25 RX ADMIN — SODIUM CHLORIDE, PRESERVATIVE FREE 10 ML: 5 INJECTION INTRAVENOUS at 09:52

## 2025-02-25 RX ADMIN — ALBUTEROL SULFATE 2.5 MG: 2.5 SOLUTION RESPIRATORY (INHALATION) at 15:53

## 2025-02-25 RX ADMIN — AMPICILLIN SODIUM AND SULBACTAM SODIUM 3000 MG: 2; 1 INJECTION, POWDER, FOR SOLUTION INTRAMUSCULAR; INTRAVENOUS at 17:28

## 2025-02-25 RX ADMIN — MEMANTINE HYDROCHLORIDE 10 MG: 10 TABLET, FILM COATED ORAL at 22:13

## 2025-02-25 RX ADMIN — ALBUTEROL SULFATE 2.5 MG: 2.5 SOLUTION RESPIRATORY (INHALATION) at 08:17

## 2025-02-25 RX ADMIN — MEMANTINE HYDROCHLORIDE 10 MG: 10 TABLET, FILM COATED ORAL at 09:49

## 2025-02-25 NOTE — CONSULTS
Subjective:   CHIEF COMPLAINT / HPI:  79 year old male admitted with altered mental status.he was obtunded and hypopxemic. He is currently on bipap. He is not struggling to breath.      Past Medical History:  Past Medical History:   Diagnosis Date    Allergic rhinitis     Anxiety     BPH (benign prostatic hyperplasia)     CAD (coronary artery disease)     Dermatitis     GERD (gastroesophageal reflux disease)     Hyperlipidemia     Hypertension     Idiopathic peripheral autonomic neuropathy     Insomnia     Meniere's disease     Osteoarthritis     Polyneuropathy     Pulmonary embolism (HCC)     Urinary retention     Vitamin B12 deficiency anemia        Past Surgical History:        Procedure Laterality Date    ABOVE KNEE AMPUTATION Right     APPENDECTOMY      CHOLECYSTECTOMY, LAPAROSCOPIC N/A 5/1/2020    CHOLECYSTECTOMY LAPAROSCOPIC WITH IOC performed by Vania Kaur MD at [facility] OR    ERCP N/A 4/30/2020    ERCP DIAGNOSTIC performed by Vania Kaur MD at [facility] ENDOSCOPY    ERCP N/A 5/3/2020    ERCP STENT INSERTION of 7mm biliary stent, pd not injected performed by Shubham Torres MD at [facility] ENDOSCOPY    ERCP N/A 6/19/2020    ERCP FOREIGN BODY REMOVAL/STENT REMOVAL/ SPHINCTEROTOMY AND BALLOON SWEEP AND REMOVAL OF MULTIPLE STONES AND SLUDGE performed by Vania Kaur MD at [facility] ENDOSCOPY    TONSILLECTOMY         Current Medications:    Current Facility-Administered Medications: sodium chloride flush 0.9 % injection 5-40 mL, 5-40 mL, IntraVENous, 2 times per day  sodium chloride flush 0.9 % injection 5-40 mL, 5-40 mL, IntraVENous, PRN  0.9 % sodium chloride infusion, , IntraVENous, PRN  ondansetron (ZOFRAN-ODT) disintegrating tablet 4 mg, 4 mg, Oral, Q8H PRN **OR** ondansetron (ZOFRAN) injection 4 mg, 4 mg, IntraVENous, Q6H PRN  polyethylene glycol (GLYCOLAX) packet 17 g, 17 g, Oral, Daily PRN  aspirin chewable tablet 81 mg, 81 mg, Oral, Daily **OR** aspirin suppository 300 mg, 300 mg, Rectal, 
 [facility]  Consult Note       Jose Guadalupe GARSIA  AGE: 79 y.o.   GENDER: male  : 1945  TODAY'S DATE:  2025    Subjective:     Reason for CWOCN Evaluation and Assessment: wound reassessment      Jose Guadalupe GARSIA is a 79 y.o. male referred by:   [x] Physician  [] Nursing  [] Other:     Wound Identification:  Wound Type: pressure and traumatic  Contributing Factors: diabetes, chronic pressure, decreased mobility, decreased tissue oxygenation, anticoagulation therapy, incontinence of stool, and incontinence of urine        PAST MEDICAL HISTORY        Diagnosis Date    Allergic rhinitis     Anxiety     BPH (benign prostatic hyperplasia)     CAD (coronary artery disease)     Dermatitis     GERD (gastroesophageal reflux disease)     Hyperlipidemia     Hypertension     Idiopathic peripheral autonomic neuropathy     Insomnia     Meniere's disease     Osteoarthritis     Polyneuropathy     Pulmonary embolism (HCC)     Urinary retention     Vitamin B12 deficiency anemia        PAST SURGICAL HISTORY    Past Surgical History:   Procedure Laterality Date    ABOVE KNEE AMPUTATION Right     APPENDECTOMY      CHOLECYSTECTOMY, LAPAROSCOPIC N/A 2020    CHOLECYSTECTOMY LAPAROSCOPIC WITH IOC performed by Vania Kaur MD at [facility] OR    ERCP N/A 2020    ERCP DIAGNOSTIC performed by Vania Kaur MD at [facility] ENDOSCOPY    ERCP N/A 5/3/2020    ERCP STENT INSERTION of 7mm biliary stent, pd not injected performed by Shubham Torres MD at [facility] ENDOSCOPY    ERCP N/A 2020    ERCP FOREIGN BODY REMOVAL/STENT REMOVAL/ SPHINCTEROTOMY AND BALLOON SWEEP AND REMOVAL OF MULTIPLE STONES AND SLUDGE performed by Vania Kaur MD at [facility] ENDOSCOPY    TONSILLECTOMY         FAMILY HISTORY    No family history on file.    SOCIAL HISTORY    Social History     Tobacco Use    Smoking status: Former    Smokeless tobacco: Never   Vaping Use    Vaping status: Never Used   Substance Use Topics    
 [facility]  Consult Note       Jose Guadalupe GARSIA  AGE: 79 y.o.   GENDER: male  : 1945  TODAY'S DATE:  2025    Subjective:     Reason for CWOCN Evaluation and Assessment: wound reassessment      Jose Guadalupe GRASIA is a 79 y.o. male referred by:   [x] Physician  [] Nursing  [] Other:     Wound Identification:  Wound Type: pressure and traumatic  Contributing Factors: diabetes, chronic pressure, decreased mobility, decreased tissue oxygenation, anticoagulation therapy, incontinence of stool, and incontinence of urine        PAST MEDICAL HISTORY        Diagnosis Date    Allergic rhinitis     Anxiety     BPH (benign prostatic hyperplasia)     CAD (coronary artery disease)     Dermatitis     GERD (gastroesophageal reflux disease)     Hyperlipidemia     Hypertension     Idiopathic peripheral autonomic neuropathy     Insomnia     Meniere's disease     Osteoarthritis     Polyneuropathy     Pulmonary embolism (HCC)     Urinary retention     Vitamin B12 deficiency anemia        PAST SURGICAL HISTORY    Past Surgical History:   Procedure Laterality Date    ABOVE KNEE AMPUTATION Right     APPENDECTOMY      CHOLECYSTECTOMY, LAPAROSCOPIC N/A 2020    CHOLECYSTECTOMY LAPAROSCOPIC WITH IOC performed by Vania Kaur MD at [facility] OR    ERCP N/A 2020    ERCP DIAGNOSTIC performed by Vania Kaur MD at [facility] ENDOSCOPY    ERCP N/A 5/3/2020    ERCP STENT INSERTION of 7mm biliary stent, pd not injected performed by Shubham Torres MD at [facility] ENDOSCOPY    ERCP N/A 2020    ERCP FOREIGN BODY REMOVAL/STENT REMOVAL/ SPHINCTEROTOMY AND BALLOON SWEEP AND REMOVAL OF MULTIPLE STONES AND SLUDGE performed by Vania Kaur MD at [facility] ENDOSCOPY    TONSILLECTOMY         FAMILY HISTORY    No family history on file.    SOCIAL HISTORY    Social History     Tobacco Use    Smoking status: Former    Smokeless tobacco: Never   Vaping Use    Vaping status: Never Used   Substance Use Topics    
Comprehensive Nutrition Assessment    Type and Reason for Visit:  Initial, Consult (Nutritional Assessment for Jeremy Score)    Nutrition Recommendations/Plan:   Continue current diet  Begin diabetic oral nutrition supplement tid, between meals  Nutrition focused physical exam for malnutrition at reassess     Malnutrition Assessment:  Malnutrition Status:  Insufficient data (02/20/25 7859)    Context:  Acute Illness       Nutrition Assessment:    Pt admitted from [facility] with acute encephalopathy. H/O hypertension, diabetes mellitus type 2, BPH, depression, anxiety, right BKA, PE. SLP recommended downgrade to minced and moist solids diet (per pt request)/thin liquids with strict aspiration precautions. Has been feeding self with variable po intake over stay with BiPAP use, consuming 76% to all today. He has lost about 50 lb over the past 5 yrs, recent history is n/a. Wounds noted, Jeremy nutrition score is 2. Will add oral supplement to optimize his intake and continue to follow as high nutrition risk with need for NFPE.    Nutrition Related Findings:    K 3.4, BUN 6, Cr 0.3, Glu 168, A1c 6.1   Wound Type: Pressure Injury, Stage II, Deep Tissue Injury       Current Nutrition Intake & Therapies:    Average Meal Intake: %, 26-50%, 1-25%  Average Supplements Intake: None Ordered  ADULT DIET; Dysphagia - Minced and Moist    Anthropometric Measures:  Height: 172.7 cm (5' 8\")  Ideal Body Weight (IBW): 154 lbs (70 kg)    Admission Body Weight: 68.2 kg (150 lb 5.7 oz)  Current Body Weight: 68.2 kg (150 lb 5.7 oz),   IBW.    Current BMI (kg/m2): 22.9  Usual Body Weight: 88.5 kg (195 lb) (2021)     % Weight Change (Calculated): -22.9  Weight Adjustment For: Amputation  Total Adjusted Percentage (Calculated): 5.9           Adjusted BMI (kg/m2) (Calculated): 24.3  BMI Categories: Normal Weight (BMI 22.0 to 24.9) age over 65    Estimated Daily Nutrient Needs:  Energy Requirements Based On: Kcal/kg  Weight Used for Energy 
.9 (H) 78.0 - 100.0 fL    MCH 40.5 (H) 27.0 - 31.0 pg    MCHC 36.2 (H) 32.0 - 36.0 g/dL    RDW 15.2 (H) 11.7 - 14.9 %    Platelets 201 140 - 440 k/uL    MPV 10.5 7.5 - 11.1 fL   Basic Metabolic Panel    Collection Time: 02/25/25  5:47 AM   Result Value Ref Range    Sodium 139 136 - 145 mmol/L    Potassium 3.6 3.5 - 5.1 mmol/L    Chloride 100 99 - 110 mmol/L    CO2 34 (H) 21 - 32 mmol/L    Anion Gap 5 (L) 9 - 17 mmol/L    Glucose 104 (H) 74 - 99 mg/dL    BUN 5 (L) 7 - 20 mg/dL    Creatinine 0.2 (L) 0.8 - 1.3 mg/dL    Est, Glom Filt Rate >90 >60 mL/min/1.73m2    Calcium 8.5 8.3 - 10.6 mg/dL   Magnesium    Collection Time: 02/25/25  5:47 AM   Result Value Ref Range    Magnesium 1.8 1.8 - 2.4 mg/dL   Phosphorus    Collection Time: 02/25/25  5:47 AM   Result Value Ref Range    Phosphorus 2.5 2.5 - 4.9 mg/dL   POCT Glucose    Collection Time: 02/25/25  8:21 AM   Result Value Ref Range    POC Glucose 117 (H) 74 - 99 mg/dL       Review of Systems:  Reports of no current cardiovascular, respiratory, gastrointestinal, genitourinary, integumentary, neurological, muscuoskeletal, or immunological symptoms today.   PSYCHIATRIC: See HPI above.    PSYCHIATRIC EXAMINATION / MENTAL STATUS EXAM    CONSTITUTIONAL:    Vitals:   Vitals:    02/25/25 0819   BP:    Pulse: 72   Resp: 18   Temp:    SpO2: 91%      General appearance: [x] appears age, []  appears older than stated age,               [x]  adequately dressed and groomed, [] disheveled,               [x]  in no acute distress, [] appears mildly distressed, [] other           MUSCULOSKELETAL:   Gait:   [] normal, [] antalgic, [] unsteady, [] gait not evaluated   Station:             [] erect, [] sitting, [] recumbent, [] other        Strength/tone:  [x] muscle strength and tone appear consistent with age and                                        condition     [] atrophy      [] abnormal movements     Vitals: Blood pressure (!) 117/51, pulse 72, temperature 98.3 °F (36.8 
Immersion  [] Bariatric  [] Total Pressure Relief  [] Other:     Discharge Plan:  Placement for patient upon discharge: tbd  Hospice Care: no  Patient appropriate for Outpatient Wound Care Center: tbd    Patient/Caregiver Teaching:  Level of patient/caregiver understanding able to: needs reinforcement        Electronically signed by Belkys Santa RN,  on 2/14/2025 at 11:24 AM

## 2025-02-25 NOTE — CARE COORDINATION
Pt is being discharged today:      Ophelia  [address]     Call report to [phone]     Complete and sign MARITZA then   Fax AVS to [phone]     Place AVS and any scripts in the packet to go with pt to the facility.     Notified Glory ann,  at 19:00  [phone]  After 4:30 pm [phone]    EZE[address] aware of discharge    Judi at the [facility] aware of discharge     Packet at [facility]

## 2025-02-26 VITALS
WEIGHT: 149.6 LBS | SYSTOLIC BLOOD PRESSURE: 122 MMHG | HEART RATE: 62 BPM | RESPIRATION RATE: 18 BRPM | TEMPERATURE: 98.2 F | DIASTOLIC BLOOD PRESSURE: 72 MMHG | OXYGEN SATURATION: 95 % | HEIGHT: 68 IN | BODY MASS INDEX: 22.67 KG/M2

## 2025-02-26 LAB
ANION GAP SERPL CALCULATED.3IONS-SCNC: 5 MMOL/L (ref 9–17)
ARTERIAL PATENCY WRIST A: ABNORMAL
BODY TEMPERATURE: 37
BUN SERPL-MCNC: 4 MG/DL (ref 7–20)
CALCIUM SERPL-MCNC: 9 MG/DL (ref 8.3–10.6)
CHLORIDE SERPL-SCNC: 97 MMOL/L (ref 99–110)
CO2 SERPL-SCNC: 36 MMOL/L (ref 21–32)
COHGB MFR BLD: 2.3 % (ref 0.5–1.5)
CREAT SERPL-MCNC: 0.3 MG/DL (ref 0.8–1.3)
ERYTHROCYTE [DISTWIDTH] IN BLOOD BY AUTOMATED COUNT: 15.3 % (ref 11.7–14.9)
GFR, ESTIMATED: >90 ML/MIN/1.73M2
GLUCOSE BLD-MCNC: 118 MG/DL (ref 74–99)
GLUCOSE BLD-MCNC: 198 MG/DL (ref 74–99)
GLUCOSE SERPL-MCNC: 107 MG/DL (ref 74–99)
HCO3 VENOUS: 34.5 MMOL/L (ref 22–29)
HCT VFR BLD AUTO: 27.8 % (ref 42–52)
HGB BLD-MCNC: 9.6 G/DL (ref 13.5–18)
MAGNESIUM SERPL-MCNC: 1.8 MG/DL (ref 1.8–2.4)
MCH RBC QN AUTO: 37.8 PG (ref 27–31)
MCHC RBC AUTO-ENTMCNC: 34.5 G/DL (ref 32–36)
MCV RBC AUTO: 109.4 FL (ref 78–100)
METHEMOGLOBIN: 0.1 % (ref 0.5–1.5)
OXYHGB MFR BLD: 36 %
PCO2 VENOUS: 65.3 MM HG (ref 38–54)
PH VENOUS: 7.34 (ref 7.32–7.43)
PHOSPHATE SERPL-MCNC: 2.3 MG/DL (ref 2.5–4.9)
PLATELET # BLD AUTO: 190 K/UL (ref 140–440)
PMV BLD AUTO: 10.3 FL (ref 7.5–11.1)
PO2 VENOUS: 22.8 MM HG (ref 23–48)
POSITIVE BASE EXCESS, VEN: 7 MMOL/L (ref 0–3)
POTASSIUM SERPL-SCNC: 3.5 MMOL/L (ref 3.5–5.1)
RBC # BLD AUTO: 2.54 M/UL (ref 4.6–6.2)
SODIUM SERPL-SCNC: 138 MMOL/L (ref 136–145)
TEXT FOR RESPIRATORY: ABNORMAL
WBC OTHER # BLD: 4 K/UL (ref 4–10.5)

## 2025-02-26 PROCEDURE — 6370000000 HC RX 637 (ALT 250 FOR IP): Performed by: NURSE PRACTITIONER

## 2025-02-26 PROCEDURE — 97535 SELF CARE MNGMENT TRAINING: CPT

## 2025-02-26 PROCEDURE — 94660 CPAP INITIATION&MGMT: CPT

## 2025-02-26 PROCEDURE — 6370000000 HC RX 637 (ALT 250 FOR IP): Performed by: INTERNAL MEDICINE

## 2025-02-26 PROCEDURE — 84100 ASSAY OF PHOSPHORUS: CPT

## 2025-02-26 PROCEDURE — 83735 ASSAY OF MAGNESIUM: CPT

## 2025-02-26 PROCEDURE — 6370000000 HC RX 637 (ALT 250 FOR IP): Performed by: HOSPITALIST

## 2025-02-26 PROCEDURE — 82805 BLOOD GASES W/O2 SATURATION: CPT

## 2025-02-26 PROCEDURE — 94640 AIRWAY INHALATION TREATMENT: CPT

## 2025-02-26 PROCEDURE — 85027 COMPLETE CBC AUTOMATED: CPT

## 2025-02-26 PROCEDURE — 6360000002 HC RX W HCPCS: Performed by: HOSPITALIST

## 2025-02-26 PROCEDURE — 94761 N-INVAS EAR/PLS OXIMETRY MLT: CPT

## 2025-02-26 PROCEDURE — 6360000002 HC RX W HCPCS: Performed by: INTERNAL MEDICINE

## 2025-02-26 PROCEDURE — 80048 BASIC METABOLIC PNL TOTAL CA: CPT

## 2025-02-26 PROCEDURE — 36415 COLL VENOUS BLD VENIPUNCTURE: CPT

## 2025-02-26 PROCEDURE — 82962 GLUCOSE BLOOD TEST: CPT

## 2025-02-26 PROCEDURE — 2700000000 HC OXYGEN THERAPY PER DAY

## 2025-02-26 PROCEDURE — 97530 THERAPEUTIC ACTIVITIES: CPT

## 2025-02-26 PROCEDURE — 2580000003 HC RX 258: Performed by: HOSPITALIST

## 2025-02-26 PROCEDURE — 2500000003 HC RX 250 WO HCPCS: Performed by: INTERNAL MEDICINE

## 2025-02-26 RX ORDER — ASPIRIN 81 MG/1
81 TABLET, CHEWABLE ORAL DAILY
Qty: 30 TABLET | Refills: 0 | Status: SHIPPED | OUTPATIENT
Start: 2025-02-27

## 2025-02-26 RX ORDER — METOPROLOL TARTRATE 25 MG/1
12.5 TABLET, FILM COATED ORAL 2 TIMES DAILY
Qty: 30 TABLET | Refills: 0 | Status: SHIPPED | OUTPATIENT
Start: 2025-02-26

## 2025-02-26 RX ADMIN — APIXABAN 5 MG: 5 TABLET, FILM COATED ORAL at 08:48

## 2025-02-26 RX ADMIN — TAMSULOSIN HYDROCHLORIDE 0.4 MG: 0.4 CAPSULE ORAL at 08:45

## 2025-02-26 RX ADMIN — SERTRALINE HYDROCHLORIDE 50 MG: 50 TABLET ORAL at 08:47

## 2025-02-26 RX ADMIN — AMPICILLIN SODIUM AND SULBACTAM SODIUM 3000 MG: 2; 1 INJECTION, POWDER, FOR SOLUTION INTRAMUSCULAR; INTRAVENOUS at 08:42

## 2025-02-26 RX ADMIN — METOPROLOL TARTRATE 12.5 MG: 25 TABLET, FILM COATED ORAL at 08:47

## 2025-02-26 RX ADMIN — ASPIRIN 81 MG CHEWABLE TABLET 81 MG: 81 TABLET CHEWABLE at 08:45

## 2025-02-26 RX ADMIN — ALBUTEROL SULFATE 2.5 MG: 2.5 SOLUTION RESPIRATORY (INHALATION) at 07:25

## 2025-02-26 RX ADMIN — SODIUM CHLORIDE, PRESERVATIVE FREE 10 ML: 5 INJECTION INTRAVENOUS at 08:37

## 2025-02-26 RX ADMIN — Medication 5000 UNITS: at 08:46

## 2025-02-26 RX ADMIN — MEMANTINE HYDROCHLORIDE 10 MG: 10 TABLET, FILM COATED ORAL at 08:47

## 2025-02-26 RX ADMIN — FINASTERIDE 5 MG: 5 TABLET, FILM COATED ORAL at 08:46

## 2025-02-26 RX ADMIN — AMPICILLIN SODIUM AND SULBACTAM SODIUM 3000 MG: 2; 1 INJECTION, POWDER, FOR SOLUTION INTRAMUSCULAR; INTRAVENOUS at 02:55

## 2025-02-26 RX ADMIN — INSULIN LISPRO 1 UNITS: 100 INJECTION, SOLUTION INTRAVENOUS; SUBCUTANEOUS at 11:40

## 2025-02-26 ASSESSMENT — PAIN DESCRIPTION - ONSET: ONSET: GRADUAL

## 2025-02-26 ASSESSMENT — PAIN SCALES - WONG BAKER: WONGBAKER_NUMERICALRESPONSE: HURTS WHOLE LOT

## 2025-02-26 ASSESSMENT — PAIN DESCRIPTION - DESCRIPTORS: DESCRIPTORS: ACHING

## 2025-02-26 ASSESSMENT — PAIN - FUNCTIONAL ASSESSMENT: PAIN_FUNCTIONAL_ASSESSMENT: PREVENTS OR INTERFERES SOME ACTIVE ACTIVITIES AND ADLS

## 2025-02-26 ASSESSMENT — PAIN DESCRIPTION - FREQUENCY: FREQUENCY: INTERMITTENT

## 2025-02-26 ASSESSMENT — PAIN DESCRIPTION - PAIN TYPE: TYPE: CHRONIC PAIN

## 2025-02-26 ASSESSMENT — PAIN SCALES - GENERAL: PAINLEVEL_OUTOF10: 8

## 2025-02-26 ASSESSMENT — PAIN DESCRIPTION - LOCATION: LOCATION: BACK

## 2025-02-26 ASSESSMENT — PAIN DESCRIPTION - ORIENTATION: ORIENTATION: LOWER

## 2025-02-26 NOTE — PLAN OF CARE
Problem: Discharge Planning  Goal: Discharge to home or other facility with appropriate resources  2/17/2025 0847 by Afsaneh Gomez RN  Outcome: Progressing  2/17/2025 0319 by Saint Juste, Chedenine, RN  Outcome: Progressing     Problem: Safety - Adult  Goal: Free from fall injury  2/17/2025 0847 by Afsaneh Gomez RN  Outcome: Progressing  2/17/2025 0319 by Saint Juste, Chedenine, RN  Outcome: Progressing     Problem: Skin/Tissue Integrity  Goal: Skin integrity remains intact  Description: 1.  Monitor for areas of redness and/or skin breakdown  2.  Assess vascular access sites hourly  3.  Every 4-6 hours minimum:  Change oxygen saturation probe site  4.  Every 4-6 hours:  If on nasal continuous positive airway pressure, respiratory therapy assess nares and determine need for appliance change or resting period  2/17/2025 0847 by Afsaneh Gomez RN  Outcome: Progressing  2/17/2025 0319 by Saint Juste, Chedenine, RN  Outcome: Progressing     Problem: Pain  Goal: Verbalizes/displays adequate comfort level or baseline comfort level  2/17/2025 0847 by Afsaneh Gomez RN  Outcome: Progressing  2/17/2025 0319 by Saint Juste, Chedenine, RN  Outcome: Progressing     Problem: Coping  Goal: Pt/Family able to verbalize concerns and demonstrate effective coping strategies  Description: INTERVENTIONS:  1. Assist patient/family to identify coping skills, available support systems and cultural and spiritual values  2. Provide emotional support, including active listening and acknowledgement of concerns of patient and caregivers  3. Reduce environmental stimuli, as able  4. Instruct patient/family in relaxation techniques, as appropriate  5. Assess for spiritual pain/suffering and initiate Spiritual Care, Psychosocial Clinical Specialist consults as needed  2/17/2025 0847 by Afsaneh Gomez RN  Outcome: Progressing  2/17/2025 0319 by Saint Juste, Chedenine, RN  Outcome: Progressing     Problem: Decision Making  Goal: 
  Problem: Discharge Planning  Goal: Discharge to home or other facility with appropriate resources  2/18/2025 1110 by Afsaneh Gomez RN  Outcome: Progressing  2/18/2025 0225 by Haylee Yepez RN  Outcome: Progressing     Problem: Safety - Adult  Goal: Free from fall injury  2/18/2025 1110 by Afsaneh Gomez RN  Outcome: Progressing  2/18/2025 0225 by Haylee Yepez RN  Outcome: Progressing     Problem: Skin/Tissue Integrity  Goal: Skin integrity remains intact  Description: 1.  Monitor for areas of redness and/or skin breakdown  2.  Assess vascular access sites hourly  3.  Every 4-6 hours minimum:  Change oxygen saturation probe site  4.  Every 4-6 hours:  If on nasal continuous positive airway pressure, respiratory therapy assess nares and determine need for appliance change or resting period  2/18/2025 1110 by Afsaneh Gomez RN  Outcome: Progressing  2/18/2025 0225 by Haylee Yepez RN  Outcome: Progressing     Problem: Pain  Goal: Verbalizes/displays adequate comfort level or baseline comfort level  2/18/2025 1110 by Afsaneh Gomez RN  Outcome: Progressing  2/18/2025 0225 by Haylee Yepez RN  Outcome: Progressing     Problem: Coping  Goal: Pt/Family able to verbalize concerns and demonstrate effective coping strategies  Description: INTERVENTIONS:  1. Assist patient/family to identify coping skills, available support systems and cultural and spiritual values  2. Provide emotional support, including active listening and acknowledgement of concerns of patient and caregivers  3. Reduce environmental stimuli, as able  4. Instruct patient/family in relaxation techniques, as appropriate  5. Assess for spiritual pain/suffering and initiate Spiritual Care, Psychosocial Clinical Specialist consults as needed  Outcome: Progressing     Problem: Decision Making  Goal: Pt/Family able to effectively weigh alternatives and participate in decision making related to treatment and care  Description: 
  Problem: Discharge Planning  Goal: Discharge to home or other facility with appropriate resources  2/19/2025 1208 by Margy Bowser RN  Outcome: Progressing  2/19/2025 1207 by Margy Bowser RN  Outcome: Progressing     Problem: Safety - Adult  Goal: Free from fall injury  2/19/2025 1208 by Margy Bowser RN  Outcome: Progressing  2/19/2025 1207 by Margy Bowser RN  Outcome: Progressing     Problem: Skin/Tissue Integrity  Goal: Skin integrity remains intact  Description: 1.  Monitor for areas of redness and/or skin breakdown  2.  Assess vascular access sites hourly  3.  Every 4-6 hours minimum:  Change oxygen saturation probe site  4.  Every 4-6 hours:  If on nasal continuous positive airway pressure, respiratory therapy assess nares and determine need for appliance change or resting period  2/19/2025 1208 by Margy Bowser RN  Outcome: Progressing  2/19/2025 1207 by Margy Bowser RN  Outcome: Progressing     Problem: Pain  Goal: Verbalizes/displays adequate comfort level or baseline comfort level  2/19/2025 1208 by Margy Bowser RN  Outcome: Progressing  2/19/2025 1207 by Margy Bowser RN  Outcome: Progressing     Problem: Coping  Goal: Pt/Family able to verbalize concerns and demonstrate effective coping strategies  Description: INTERVENTIONS:  1. Assist patient/family to identify coping skills, available support systems and cultural and spiritual values  2. Provide emotional support, including active listening and acknowledgement of concerns of patient and caregivers  3. Reduce environmental stimuli, as able  4. Instruct patient/family in relaxation techniques, as appropriate  5. Assess for spiritual pain/suffering and initiate Spiritual Care, Psychosocial Clinical Specialist consults as needed  2/19/2025 1208 by Margy Bowser RN  Outcome: Progressing  2/19/2025 1207 by Margy Bowser RN  Outcome: Progressing     Problem: Decision Making  Goal: Pt/Family able to effectively weigh 
  Problem: Discharge Planning  Goal: Discharge to home or other facility with appropriate resources  Outcome: Progressing     Problem: Safety - Adult  Goal: Free from fall injury  Outcome: Progressing     Problem: Skin/Tissue Integrity  Goal: Skin integrity remains intact  Description: 1.  Monitor for areas of redness and/or skin breakdown  2.  Assess vascular access sites hourly  3.  Every 4-6 hours minimum:  Change oxygen saturation probe site  4.  Every 4-6 hours:  If on nasal continuous positive airway pressure, respiratory therapy assess nares and determine need for appliance change or resting period  Outcome: Progressing     Problem: Pain  Goal: Verbalizes/displays adequate comfort level or baseline comfort level  Outcome: Progressing     
  Problem: Discharge Planning  Goal: Discharge to home or other facility with appropriate resources  Outcome: Progressing     Problem: Safety - Adult  Goal: Free from fall injury  Outcome: Progressing     Problem: Skin/Tissue Integrity  Goal: Skin integrity remains intact  Description: 1.  Monitor for areas of redness and/or skin breakdown  2.  Assess vascular access sites hourly  3.  Every 4-6 hours minimum:  Change oxygen saturation probe site  4.  Every 4-6 hours:  If on nasal continuous positive airway pressure, respiratory therapy assess nares and determine need for appliance change or resting period  Outcome: Progressing     Problem: Pain  Goal: Verbalizes/displays adequate comfort level or baseline comfort level  Outcome: Progressing     Problem: Coping  Goal: Pt/Family able to verbalize concerns and demonstrate effective coping strategies  Description: INTERVENTIONS:  1. Assist patient/family to identify coping skills, available support systems and cultural and spiritual values  2. Provide emotional support, including active listening and acknowledgement of concerns of patient and caregivers  3. Reduce environmental stimuli, as able  4. Instruct patient/family in relaxation techniques, as appropriate  5. Assess for spiritual pain/suffering and initiate Spiritual Care, Psychosocial Clinical Specialist consults as needed  Outcome: Progressing     Problem: Decision Making  Goal: Pt/Family able to effectively weigh alternatives and participate in decision making related to treatment and care  Description: INTERVENTIONS:  1. Determine when there are differences between patient's view, family's view, and healthcare provider's view of condition  2. Facilitate patient and family articulation of goals for care  3. Help patient and family identify pros/cons of alternative solutions  4. Provide information as requested by patient/family  5. Respect patient/family right to receive or not to receive information  6. Serve 
substances which indicate the need for search of the family and/or belongings  3. Encourage verbalization of thoughts and concerns in a socially appropriate manner  4. Utilize positive, consistent limit setting strategies supporting safety of patient, staff and others  5. Encourage participation in the decision making process about the behavioral management agreement  6. If a visitor's behavior poses a threat to safety call refer to organization policy.  7. Initiate consult with , Psychosocial CNS, Spiritual Care as appropriate  Outcome: Progressing     Problem: Neurosensory - Adult  Goal: Achieves stable or improved neurological status  Outcome: Progressing  Goal: Achieves maximal functionality and self care  Outcome: Progressing     Problem: Respiratory - Adult  Goal: Achieves optimal ventilation and oxygenation  Outcome: Progressing     Problem: Skin/Tissue Integrity - Adult  Goal: Skin integrity remains intact  Description: 1.  Monitor for areas of redness and/or skin breakdown  2.  Assess vascular access sites hourly  3.  Every 4-6 hours minimum:  Change oxygen saturation probe site  4.  Every 4-6 hours:  If on nasal continuous positive airway pressure, respiratory therapy assess nares and determine need for appliance change or resting period  2/20/2025 1448 by Seamus Sol, RN  Outcome: Progressing  2/20/2025 0420 by Haylee Yepez, RN  Outcome: Progressing  Goal: Oral mucous membranes remain intact  Outcome: Progressing     Problem: Musculoskeletal - Adult  Goal: Return mobility to safest level of function  Outcome: Progressing  Goal: Maintain proper alignment of affected body part  Outcome: Progressing  Goal: Return ADL status to a safe level of function  Outcome: Progressing     Problem: Genitourinary - Adult  Goal: Absence of urinary retention  Outcome: Progressing

## 2025-02-26 NOTE — PROGRESS NOTES
V2.0    [facility] [place]      Name:  Jose Guadalupe GARSIA /Age/Sex: 1945  (79 y.o. male)   MRN & CSN:  [id number] & [id number] Encounter Date/Time: 2025 12:39 PM EST   Location:  -A PCP: Azeb Urias DO     Attending:Trini Kennedy MD       Hospital Day: 10    Assessment and Recommendations   Jose Guadalupe GARSIA is a 79 y.o. male  who presents with Acute encephalopathy    Patient's breathing had been improving.  His mental status had also improved.  However today he became somnolent and was hypoxic off the BiPAP.  Patient had similar episode on  which resolved.     #.  Acute respiratory failure with hypoxia  -No wheezing on examination  -Oxygen saturation documented 68%  -Patient was initially on nonrebreather  -VBG-pH 7.241, pCO2 101.2, pO2 43.3, HCO3 42.5.  -Continue DuoNeb  -Respiratory viral panel negative  -Pulmonology following  - Off nasal cannula.  On room air during day and BiPAP at night.  - Requiring BiPAP due to hypoxia.  Check CXR.  Repeat blood gas.    # Hypokalemia  -Replete and monitor with repeat labs     #.  Acute metabolic encephalopathy  -Patient afebrile, no leukocytosis  -Patient was a stroke alert on presentation to ER  -CT head, CTA head and neck-no acute process  -Patient evaluated by [facility] stroke team not a TNK candidate-being on Eliquis.  -Continue NIHSS/PT/OT/SLP evaluation  -MRI brain no acute abnormality  - UCx : No growth.  Stop Rocephin.  -Patient has become somnolent.  VBG does not show significant hypercapnia.  Check for other reversible causes of encephalopathy.  Patient had similar episode on  which resolved on its own.    Sinus tachycardia  - EKG shows sinus tachycardia with PACs.  Check BMP and magnesium.  Check echo.  Started on low-dose beta-blocker.     #.  History of PE-  -Status post IVC  -Was on Coumadin in the past, currently on Eliquis     #.  Detectable troponin  -Troponin 32, repeat ordered  -EKG with no acute ST-T wave 
    V2.0    [facility] [place] Progress Note      Name:  Jose Guadalupe GARSIA /Age/Sex: 1945  (79 y.o. male)   MRN & CSN:  [id number] & [id number] Encounter Date/Time: 2/15/2025 12:39 PM EST   Location:  -A PCP: Azeb Urias DO     Attending:Alberto Parks MD       Hospital Day: 4    Assessment and Recommendations   Jose Guadalupe GARSIA is a 79 y.o. male  who presents with Acute encephalopathy       #.  Acute respiratory failure with hypoxia  -No wheezing on examination  -Oxygen saturation documented 68%  -Patient was initially on nonrebreather  -Patient was on 13 L of high flow oxygen during my evaluation and was saturating %  -Weaned down to 8 L and was still saturating 92-93%  -VBG-pH 7.241, pCO2 101.2, pO2 43.3, HCO3 42.5.  -Placed on BiPAP  -Monitor and wean off oxygen as tolerated  -Continue DuoNeb  -Respiratory viral panel negative  -Pulmonology consulted and following     #.  Acute metabolic encephalopathy-secondary to above  -Patient afebrile, no leukocytosis  -UA pending  -Patient was a stroke alert on presentation to ER  -CT head, CTA head and neck-no acute process  -Patient evaluated by [facility] stroke team not a TNK candidate-being on Eliquis.  -Continue NIHSS/PT/OT/SLP evaluation  -MRI brain ordered  -Patient's encephalopathy is most likely from hypercapnia     #.  History of PE-  -Status post IVC  -Was on Coumadin in the past, currently on Eliquis     #.  Detectable troponin  -Troponin 32, repeat ordered  -EKG with no acute ST-T wave changes-interpreted as A-fib, hard to interpret  -Will repeat EKG     #.  Hypertension-on amlodipine     #.  Diabetes mellitus type 2, on long-term insulin  -A1c-6.4-2024  -Patient is on Lantus 5 units daily  -Continue insulin sliding scale with hypoglycemia protocol     #.  Diabetic neuropathy-on gabapentin     #.  BPH-on tamsulosin, finasteride     #.  GERMAN on CPAP     #.  Depression/anxiety-on Zoloft, mirtazapine     #.  Chronic constipation and on 
    V2.0    [facility] [place] Progress Note      Name:  Jose Guadalupe GARSIA /Age/Sex: 1945  (79 y.o. male)   MRN & CSN:  [id number] & [id number] Encounter Date/Time: 2025 12:39 PM EST   Location:  -A PCP: Azeb Urias DO     Attending:Alberto Parks MD       Hospital Day: 6    Assessment and Recommendations   Jose Guadalupe GARSIA is a 79 y.o. male  who presents with Acute encephalopathy       #.  Acute respiratory failure with hypoxia  -No wheezing on examination  -Oxygen saturation documented 68%  -Patient was initially on nonrebreather  -Patient was on 13 L of high flow oxygen during my evaluation and was saturating %  -Weaned down to 8 L and was still saturating 92-93%  -VBG-pH 7.241, pCO2 101.2, pO2 43.3, HCO3 42.5.  -BiPAP nightly  -Monitor and wean off oxygen as tolerated  -Continue DuoNeb  -Respiratory viral panel negative  -Pulmonology consulted and following    # Hypokalemia  -Replete and monitor with repeat labs     #.  Acute metabolic encephalopathy-secondary to above  -Patient afebrile, no leukocytosis  -Patient was a stroke alert on presentation to ER  -CT head, CTA head and neck-no acute process  -Patient evaluated by [facility] stroke team not a TNK candidate-being on Eliquis.  -Continue NIHSS/PT/OT/SLP evaluation  -MRI brain no acute abnormality  -Urinalysis remarkable.  Started on Rocephin.  Follow-up on cultures     #.  History of PE-  -Status post IVC  -Was on Coumadin in the past, currently on Eliquis     #.  Detectable troponin  -Troponin 32, repeat ordered  -EKG with no acute ST-T wave changes-interpreted as A-fib     #.  Hypertension-on amlodipine     #.  Diabetes mellitus type 2, on long-term insulin  -A1c-6.4-2024  -Patient is on Lantus 5 units daily  -Continue insulin sliding scale with hypoglycemia protocol     #.  Diabetic neuropathy-on gabapentin     #.  BPH-on tamsulosin, finasteride     #.  GERMAN on CPAP     #.  Depression/anxiety-on Zoloft, mirtazapine     #.  
    V2.0    [facility] [place] Progress Note      Name:  Jose Guadalupe GARSIA /Age/Sex: 1945  (79 y.o. male)   MRN & CSN:  [id number] & [id number] Encounter Date/Time: 2025 12:39 PM EST   Location:  -A PCP: Azeb Urias DO     Attending:Trini Kennedy MD       Hospital Day: 7    Assessment and Recommendations   Jose Guadalupe GARSIA is a 79 y.o. male  who presents with Acute encephalopathy       #.  Acute respiratory failure with hypoxia  -No wheezing on examination  -Oxygen saturation documented 68%  -Patient was initially on nonrebreather  -VBG-pH 7.241, pCO2 101.2, pO2 43.3, HCO3 42.5.  -BiPAP nightly  -Monitor and wean off oxygen as tolerated  -Continue DuoNeb  -Respiratory viral panel negative  -Pulmonology following  -On BiPAP.    # Hypokalemia  -Replete and monitor with repeat labs     #.  Acute metabolic encephalopathy  -Patient afebrile, no leukocytosis  -Patient was a stroke alert on presentation to ER  -CT head, CTA head and neck-no acute process  -Patient evaluated by [facility] stroke team not a TNK candidate-being on Eliquis.  -Continue NIHSS/PT/OT/SLP evaluation  -MRI brain no acute abnormality  - Check VBG. UCx : No growth.  Stop Rocephin.     #.  History of PE-  -Status post IVC  -Was on Coumadin in the past, currently on Eliquis     #.  Detectable troponin  -Troponin 32, repeat ordered  -EKG with no acute ST-T wave changes-interpreted as A-fib  - Downward trending troponin.     #.  Hypertension       #.  Diabetes mellitus type 2  -A1c-6.4-2024  -Start low-dose sliding scale insulin.  Restart home Lantus.     #.  Diabetic neuropathy       #.  BPH  -on tamsulosin, finasteride     #.  GERMAN on CPAP     #.  Depression/anxiety  - Zoloft and Remeron      #.  Chronic constipation  - MiraLAX and Senokot     #.  Cognitive impairment  -Namenda        #.  Laparoscopic cholecystectomy-2020        Diet ADULT DIET; Dysphagia - Soft and Bite Sized   DVT Prophylaxis [] Lovenox, []  Heparin, [x] 
    V2.0    [facility] [place] Progress Note      Name:  Jose Guadalupe GARSIA /Age/Sex: 1945  (79 y.o. male)   MRN & CSN:  [id number] & [id number] Encounter Date/Time: 2025 12:39 PM EST   Location:  -A PCP: Azeb Urias DO     Attending:Sammy Kennedy MD       Hospital Day: 11    Assessment and Recommendations   Jose Guadalupe GARSIA is a 79 y.o. male  who presents with Acute encephalopathy    Waxing and waning mentation with episodes of worsening of breathing every other day.  Patient is not swallowing fairly well today.  Speech evaluation to be ordered again cannot swallow pills.  Aspiration precautions to be maintained.  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  Most recent MRI of the brain showed no acute abnormalities.  Patient had multiple episodes like this which resolved by itself during hospital stay    Delirium precautions as under  Delirium precautions:  Redirect/reorient/reassure the patient  Early mobilization please allow patient to walk halls with assistance if needed  Reduce noise and provide adequate daytime light in the room, [place] room preferable if available  Prevent sleep deprivation  Minimize the use of anticholinergic drugs benzodiazepines and narcotics  Use of eyeglasses and hearing aids  Treat volume depletion  Bowel regimens  Avoid lines or catheters if possible  Monitor for unintentional self-harm  Assess fall risk       #.  Acute respiratory failure with hypoxia concerns for atypical pneumonia  -No wheezing on examination  -Oxygen saturation documented 68%  -Patient was initially on nonrebreather  -VBG-pH 7.241, pCO2 101.2, pO2 43.3, HCO3 42.5.  -Continue DuoNeb  -Respiratory viral panel negative  -Pulmonology following  - Off nasal cannula.  On room air during day and BiPAP at night.  - Requiring BiPAP due to hypoxia.  Repeat x-ray showed bilateral disease infiltrates.  Restarted on antibiotics by the physician yesterday as Unasyn for 7 days      # Hypokalemia  -Replete and 
    V2.0    [facility] [place] [facility] Progress Note      Name:  Jose Guadalupe GARSIA /Age/Sex: 1945  (79 y.o. male)   MRN & CSN:  [id number] & [id number] Encounter Date/Time: 2025 12:39 PM EST   Location:  -A PCP: Azeb Urias DO     Attending:Sammy Kennedy MD       Hospital Day: 12    Assessment and Recommendations   Jose Guadalupe GARSIA is a 79 y.o. male  who presents with Acute encephalopathy    Waxing and waning mentation with episodes of worsening of breathing every other day.  Patient is not swallowing fairly well today.  Speech evaluation to be ordered again cannot swallow pills.  Aspiration precautions to be maintained.  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  Most recent MRI of the brain showed no acute abnormalities.  Patient had multiple episodes like this which resolved by itself during hospital stay    Delirium precautions as under  Delirium precautions:  Redirect/reorient/reassure the patient  Early mobilization please allow patient to walk halls with assistance if needed  Reduce noise and provide adequate daytime light in the room, [place] room preferable if available  Prevent sleep deprivation  Minimize the use of anticholinergic drugs benzodiazepines and narcotics  Use of eyeglasses and hearing aids  Treat volume depletion  Bowel regimens  Avoid lines or catheters if possible  Monitor for unintentional self-harm  Assess fall risk       #.  Acute respiratory failure with hypoxia concerns for atypical pneumonia  -No wheezing on examination  -Oxygen saturation documented 68%  -Patient was initially on nonrebreather  -VBG-pH 7.241, pCO2 101.2, pO2 43.3, HCO3 42.5.  -Continue DuoNeb  -Respiratory viral panel negative  -Pulmonology following  - Off nasal cannula.  On room air during day and BiPAP at night.  - Requiring BiPAP due to hypoxia.  Repeat x-ray showed bilateral disease infiltrates.  Restarted on antibiotics by the physician yesterday as Unasyn for 7 days      # Hypokalemia  -Replete and 
    V2.0    [facility] [place] [facility] [place]      Name:  Jose Guadalupe GARSIA /Age/Sex: 1945  (79 y.o. male)   MRN & CSN:  [id number] & [id number] Encounter Date/Time: 2025 12:39 PM EST   Location:  -A PCP: Azeb Urias DO     Attending:Alberto Parks MD       Hospital Day: 3    Assessment and Recommendations   Jose Guadalupe GARSIA is a 79 y.o. male  who presents with Acute encephalopathy       #.  Acute respiratory failure with hypoxia  -No wheezing on examination  -Oxygen saturation documented 68%  -Patient was initially on nonrebreather  -Patient was on 13 L of high flow oxygen during my evaluation and was saturating %  -Weaned down to 8 L and was still saturating 92-93%  -VBG-pH 7.241, pCO2 101.2, pO2 43.3, HCO3 42.5.  -Placed on BiPAP  -Monitor and wean off oxygen as tolerated  -Continue DuoNeb  -Respiratory viral panel negative  -Pulmonology consulted and following     #.  Acute metabolic encephalopathy-secondary to above  -Patient afebrile, no leukocytosis  -UA pending  -Patient was a stroke alert on presentation to ER  -CT head, CTA head and neck-no acute process  -Patient evaluated by [facility] stroke team not a TNK candidate-being on Eliquis.  -Continue NIHSS/PT/OT/SLP evaluation  -MRI brain ordered  -Patient's encephalopathy is most likely from hypercapnia     #.  History of PE-  -Status post IVC  -Was on Coumadin in the past, currently on Eliquis     #.  Detectable troponin  -Troponin 32, repeat ordered  -EKG with no acute ST-T wave changes-interpreted as A-fib, hard to interpret  -Will repeat EKG     #.  Hypertension-on amlodipine     #.  Diabetes mellitus type 2, on long-term insulin  -A1c-6.4-2024  -Patient is on Lantus 5 units daily  -Continue insulin sliding scale with hypoglycemia protocol     #.  Diabetic neuropathy-on gabapentin     #.  BPH-on tamsulosin, finasteride     #.  GERMAN on CPAP     #.  Depression/anxiety-on Zoloft, mirtazapine     #.  Chronic constipation and on 
    V2.0    [facility] [place]      Name:  Jose Guadalupe GARSIA /Age/Sex: 1945  (79 y.o. male)   MRN & CSN:  [id number] & [id number] Encounter Date/Time: 2025 12:39 PM EST   Location:  -A PCP: Azeb Urias DO     Attending:Bree Echols MD       Hospital Day: 14    Assessment and Recommendations   Jose Guadalupe GARSIA is a 79 y.o. male  who presents with Acute encephalopathy     - Decompensated respiratory status, back on BiPAP    Waxing and waning mentation with episodes of worsening of breathing every other day.  Patient is not swallowing fairly well today.  Speech evaluation to be ordered again cannot swallow pills.  Aspiration precautions to be maintained.  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  Most recent MRI of the brain showed no acute abnormalities.  Patient had multiple episodes like this which resolved by itself during hospital stay    2025 mentation is better patient is able to have minced diet with speech therapy.  Advance diet as tolerated    Delirium precautions as under  Delirium precautions:  Redirect/reorient/reassure the patient  Early mobilization please allow patient to walk halls with assistance if needed  Reduce noise and provide adequate daytime light in the room, [place] room preferable if available  Prevent sleep deprivation  Minimize the use of anticholinergic drugs benzodiazepines and narcotics  Use of eyeglasses and hearing aids  Treat volume depletion  Bowel regimens  Avoid lines or catheters if possible  Monitor for unintentional self-harm  Assess fall risk       #.  Acute respiratory failure with hypoxia concerns for atypical pneumonia  -No wheezing on examination  -Oxygen saturation documented 68%  -Patient was initially on nonrebreather  -VBG-pH 7.241, pCO2 101.2, pO2 43.3, HCO3 42.5.  -Continue DuoNeb  -Respiratory viral panel negative  -Pulmonology following  - Off nasal cannula.  On room air during day and BiPAP at night.  - Requiring BiPAP 
   02/13/25 1609   Settings/Measurements   PIP Observed 20 cm H20   IPAP 20 cmH20   CPAP/EPAP 10 cmH2O   Vt (Measured) 463 mL   Rate Ordered 20   Insp Rise Time (%) 3 %   FiO2  35 %   I Time/ I Time % 0.55 s   Minute Volume (L/min) 11.4 Liters   Mask Leak (lpm) 51 lpm   Patient's Home Machine No       
   02/14/25 0838   NIV Type   $NIV $Daily Charge   NIV Started/Stopped On   Equipment Type v60   Mode Bilevel   Mask Type Full face mask   Mask Size Medium   Assessment   Pulse 74   Heart Rate Source Monitor   Respirations 20   SpO2 94 %   Level of Consciousness 1   Comfort Level Good   Using Accessory Muscles No   Mask Compliance Good   Breath Sounds   Respiratory Pattern Regular   Settings/Measurements   IPAP 14 cmH20   CPAP/EPAP 5 cmH2O   Vt (Measured) 525 mL   Rate Ordered 20   Insp Rise Time (%) 3 %   FiO2  30 %   I Time/ I Time % 1 s   Minute Volume (L/min) 15.5 Liters   Mask Leak (lpm) 1 lpm   Patient's Home Machine No   Alarm Settings   Alarms On Y   Low Pressure (cmH2O) 5 cmH2O   High Pressure (cmH2O) 25 cmH2O   Delay Alarm 20 sec(s)   Apnea (secs) 20 secs   RR Low (bpm) 50   RR High (bpm) 12 br/min       
   02/14/25 1235   NIV Type   NIV Started/Stopped On   Equipment Type v60   Mode Bilevel   Mask Type Full face mask   Mask Size Medium   Assessment   Pulse 74   Heart Rate Source Monitor   Respirations 22   SpO2 99 %   Level of Consciousness 1   Comfort Level Good   Using Accessory Muscles No   Mask Compliance Good   Breath Sounds   Respiratory Pattern Regular   Settings/Measurements   IPAP 16 cmH20   CPAP/EPAP 5 cmH2O   Vt (Measured) 545 mL   Rate Ordered 20   Insp Rise Time (%) 3 %   FiO2  30 %   I Time/ I Time % 1 s   Minute Volume (L/min) 11.6 Liters   Mask Leak (lpm) 4 lpm   Patient's Home Machine No   Alarm Settings   Alarms On Y   Low Pressure (cmH2O) 5 cmH2O   High Pressure (cmH2O) 25 cmH2O   Delay Alarm 20 sec(s)   Apnea (secs) 20 secs   RR Low (bpm) 50   RR High (bpm) 12 br/min       
   02/14/25 1636   NIV Type   NIV Started/Stopped On   Equipment Type v60   Mode Bilevel   Mask Type Full face mask   Mask Size Medium   Assessment   Pulse 74   Heart Rate Source Monitor   Respirations 20   SpO2 96 %   Level of Consciousness 1   Comfort Level Good   Using Accessory Muscles No   Mask Compliance Good   Breath Sounds   Respiratory Pattern Regular   Settings/Measurements   IPAP 16 cmH20   CPAP/EPAP 5 cmH2O   Vt (Measured) 556 mL   Rate Ordered 20   Insp Rise Time (%) 3 %   FiO2  30 %   I Time/ I Time % 1 s   Minute Volume (L/min) 13.4 Liters   Mask Leak (lpm) 7 lpm   Patient's Home Machine No   Alarm Settings   Alarms On Y   Low Pressure (cmH2O) 5 cmH2O   High Pressure (cmH2O) 25 cmH2O   Delay Alarm 20 sec(s)   Apnea (secs) 20 secs   RR Low (bpm) 12   RR High (bpm) 50 br/min       
   02/15/25 0843   NIV Type   $NIV $Daily Charge   NIV Started/Stopped On   Equipment Type v60   Mode Bilevel   Mask Type Full face mask   Mask Size Medium   Assessment   Pulse 78   Heart Rate Source Monitor   Respirations 17   SpO2 98 %   Level of Consciousness 1   Comfort Level Good   Using Accessory Muscles No   Mask Compliance Good   Breath Sounds   Respiratory Pattern Regular   Settings/Measurements   IPAP 16 cmH20   CPAP/EPAP 5 cmH2O   Vt (Measured) 674 mL   Rate Ordered 20   Insp Rise Time (%) 3 %   FiO2  30 %   I Time/ I Time % 1 s   Minute Volume (L/min) 15.5 Liters   Mask Leak (lpm) 4 lpm   Patient's Home Machine No   Alarm Settings   Alarms On Y   Low Pressure (cmH2O) 5 cmH2O   High Pressure (cmH2O) 25 cmH2O   Delay Alarm 20 sec(s)   Apnea (secs) 20 secs   RR Low (bpm) 12   RR High (bpm) 50 br/min       
   02/18/25 1500   Encounter Summary   Encounter Overview/Reason Spiritual/Emotional Needs   Encounter Code  Assessment by  services   Service Provided For Patient   Referral/Consult From Other    Support System Family members   Last Encounter  02/18/25  (Visit by Ayden Llanos, charted by  Sidney)   Complexity of Encounter Low   Begin Time 1000   End Time  1010   Total Time Calculated 10 min   Spiritual/Emotional needs   Type Spiritual Support   Assessment/Intervention/Outcome   Assessment Calm   Intervention Active listening;Sustaining Presence/Ministry of presence   Outcome Coping   Plan and Referrals   Plan/Referrals Continue Support (comment)       
 Latest Reference Range & Units 02/13/25 15:54   Methemoglobin 0.5 - 1.5 % 1.6 (H)   Carboxyhemoglobin 0.5 - 1.5 % 1.3        Oxyhemoglobin % 31.2   pH, Jonnathan 7.320 - 7.430  7.324   pCO2, Jonnathan 38 - 54 mm Hg 86.9 (H)   pO2, Jonnathan 23 - 48 mm Hg 22.1 (L)   Bicarbonate, Venous 22 - 29 mmol/L 44.2 (H)   Positive Base Excess, Jonnathan 0 - 3 mmol/L 14.5 (H)   Pt Temp  37.0   Text for Respiratory  Called to PROVIDER on 02/13/2025 at 15:55   (H): Data is abnormally high  (L): Data is abnormally low  
1043: this RN accessed this chart as a Clinical Instructor for [facility].  
4 Eyes Skin Assessment     NAME:  Jose Guadalupe GARSIA  YOB: 1945  MEDICAL RECORD NUMBER:  [id number]    The patient is being assessed for  Admission    I agree that at least one RN has performed a thorough Head to Toe Skin Assessment on the patient. ALL assessment sites listed below have been assessed.      Areas assessed by both nurses:    Head, Face, Ears, Shoulders, Back, Chest, Arms, Elbows, Hands, Sacrum. Buttock, Coccyx, Ischium, Legs. Feet and Heels, and Under Medical Devices         Does the Patient have a Wound? Yes wound(s) were present on assessment. LDA wound assessment was Initiated and completed by RN       Jeremy Prevention initiated by RN: Yes  Wound Care Orders initiated by RN: Yes    Pressure Injury (Stage 3,4, Unstageable, DTI, NWPT, and Complex wounds) if present, place Wound referral order by RN under : Yes    New Ostomies, if present place, Ostomy referral order under : No     Nurse 1 eSignature: Electronically signed by Haylee Yepez RN on 2/13/25 at 9:13 PM EST    **SHARE this note so that the co-signing nurse can place an eSignature**    Nurse 2 eSignature: Electronically signed by Christiane Lopez RN on 2/14/25 at 3:58 AM EST   
BMP and Venous gas sent per orders awaiting results PT currently on BIPAP.  
[facility] [place]  DEPARTMENT OF SPEECH/LANGUAGE PATHOLOGY  DAILY PROGRESS NOTE  Jose Guadalupe GARSIA  2/19/2025  [id number]  DNR (do not resuscitate) [Z66]  Acute encephalopathy [G93.40]  Altered mental status, unspecified altered mental status type [R41.82]  Allergies   Allergen Reactions    Sulfa Antibiotics Other (See Comments)     unknown    Tramadol Other (See Comments)     Shaking         Pt was seen this date for dysphagia treatment.       IMPRESSION AND RECOMMENDATIONS:   Jose Guadalupe GARSIA was seen for a bedside swallowing treatment and diet tolerance monitoring.  Pt was alert and cooperative throughout assessment.  Nursing took pt off BiPAP for today's assessment.  Pt states he has had difficulty chewing solid textures on meal trays d/t reduced dentition.  For today's assessment he was positioned upright in bed and accepted PO trials of puree, soft/regular solid and thin liquids by cup/straw sips. Prolonged mastication and bolus formation, slow oral A-P transit and mild-moderate oral residue was observed with trials of regular solids.  Pt had to expel trial of soft and bite sized meat from oral cavity after prolonged mastication, reporting it was too difficult to chew.  Pharyngeal swallow appeared delayed.  Clear vocal quality and 0 overt s/s of aspiration were observed with all PO trials given.    Recommend downgrade to minced and moist solids (per pt request)/thin liquids with strict aspiration precautions.  SLP will continue to follow.      GOALS (current status in bold):  Short-term Goals  Timeframe for Short-term Goals: LOS  Goal 1: Pt will participate in PO trials diet tolerance monitoring with soft and bite sized diet with thin liquids with 0 clinican indications of apsiration in 5/5 trials. Goal not met- pt demonstrated increased difficulty masticating solids  New goal: Pt will tolerate minced and moist solids/thin liquids without clinical evidence of aspiration 100%  Goal 2: Pt will 
Blood gas results given to Marina LANG   
[facility] [place]  DEPARTMENT OF SPEECH/LANGUAGE PATHOLOGY  ATTEMPT NOTE  Jose Guadalupe ESCOTOER  2/14/2025  [id number]    SLP attempted to see Jose Guadalupe De Jesus  BETTY for a bedside swallow evaluation x2. EZEQUIEL Newton reports pt remains ofnBiPAP today and is not awake/alert to safely participate in PO intake. SLP to re-attempt at a later date/time.     Jennifer Castro MA, CF-SLP 2/14/2025   
CXR shows bilateral airspace disease, however this is not different than prior chest x-ray.  Started on antibiotics.  Check procalcitonin and BNP.  If low suspicion for infection then stop antibiotics.  
Comprehensive Nutrition Assessment    Type and Reason for Visit:  Reassess    Nutrition Recommendations/Plan:   Daily weights  Continue current diet and oral nutrition supplements  Please document intakes of all meals, snacks, and supplements   Monitor weights, po intakes, labs, skin, POC     Malnutrition Assessment:  Malnutrition Status:  Insufficient data (02/20/25 0459)    Context:  Acute Illness       Nutrition Assessment:    Pt sleeping soundly at visit, on O2. Per SLP recs remains on minced and moist diet w/ diabetic oral supplement ordered. PO intakes are varied but mostly <50% of meals. Pt did consume % of a supplement, will continue to offer at this time. Will order daily weights as pt has not had an updated wt since admit 12d ago. Continue to follow at high nutrition risk.    Nutrition Related Findings:    +remeron, vit D. glucose 138-230 Wound Type: Pressure Injury, Stage II, Deep Tissue Injury       Current Nutrition Intake & Therapies:    Average Meal Intake: 1-25%, 26-50% (highly varied)  Average Supplements Intake: % (x1)  ADULT DIET; Dysphagia - Minced and Moist  ADULT ORAL NUTRITION SUPPLEMENT; Breakfast, Lunch, Dinner; Diabetic Oral Supplement    Anthropometric Measures:  Height: 172.7 cm (5' 8\")  Ideal Body Weight (IBW): 154 lbs (70 kg)    Admission Body Weight: 68.2 kg (150 lb 5.7 oz)  Current Body Weight: 68.2 kg (150 lb 5.7 oz),   IBW.    Current BMI (kg/m2): 22.9  Usual Body Weight: 88.5 kg (195 lb) (2021)     % Weight Change (Calculated): -22.9  Weight Adjustment For: Amputation  Total Adjusted Percentage (Calculated): 5.9           Adjusted BMI (kg/m2) (Calculated): 24.3  BMI Categories: Normal Weight (BMI 22.0 to 24.9) age over 65    Estimated Daily Nutrient Needs:  Energy Requirements Based On: Kcal/kg  Weight Used for Energy Requirements: Current  Energy (kcal/day): 3654-3890 (30-35 kcal/kg)  Weight Used for Protein Requirements: Current  Protein (g/day): 82-95 (1.2-1.4 
Critical VBG results given to MD and RT, verbal order given to place pt on BIPAP per . pt placed on AVAPS settings to increase tidal volumes  
Critical VBG results. PS sent to Dr. Parks.     Latest Reference Range & Units 02/13/25 08:56   pH, Jonnathan 7.320 - 7.430  7.328   pCO2, Jonnathan 38 - 54 mm Hg 73.3 (H)   pO2, Jonnathan 23 - 48 mm Hg 71.1 (H)   Bicarbonate, Venous 22 - 29 mmol/L 37.6 (H)   Positive Base Excess, Jonnathan 0 - 3 mmol/L 9.0 (H)     
Critical vbg results given to RT and called to MD  
Facility/Department: [facility] ICU STEPDOWN   CLINICAL BEDSIDE SWALLOW EVALUATION    NAME: Jose Guadalupe GARSIA  : 1945  MRN: [id number]    ADMISSION DATE: 2025  ADMITTING DIAGNOSIS: has Jaundice; Elevated liver enzymes; Choledocholithiasis with obstruction; Cholelithiasis with obstruction; Postoperative bile leak; S/P AKA (above knee amputation) unilateral, right (HCC); DVT (deep vein thrombosis) in pregnancy; Pulmonary embolus (HCC); Essential hypertension; and Acute encephalopathy on their problem list.  ONSET DATE: This Admission    Recent Chest Xray on 25 with the following impressions: \"Low lung volumes. Bibasilar opacities may reflect atelectasis and/or   airspace disease. Possible small bilateral pleural effusions. \"    Date of Eval: 2/15/2025  Evaluating Therapist: Deniz Baker SLP    Impressions:   Jose Guadalupe GARSIA was seen for a bedside swallow evaluation following admission to [facility] for Acute Encephalopathy. Jose Guadalupe GARSIA with the following relevant pmh: CAD, GERD, pulmonary embolism. SLP entered with RT who weaned pt off of BiPAP to nasal cannula with 2.5L of oxygen. There was no fluctuation in pt oxygen throughout PO trials. Pt was repositioned and set upright in bed prior to PO trials, pt was cooperative and pleasant throughout the evaluation    Jose Guadalupe GARSIA participated in an oral mechanism evaluation with the following impressions:  - Natural dentition with missing teeth.  - Poor oral hygiene, provided oral care and education with Pt using toothettes and oral care solution   - Overall symmetrical facial features  - Weak lingual strength  - Functional labial ROM and strength  - Wet voice  - Wet volitional cough    Jose Guadalupe GARSIA participated in PO trials of regular solids, puree, soft solids, and thin liquids via straw. Jose Guadalupe GARSIA demonstrated evidence of mild oral phase dysphagia characterized by positive oral acceptance bolus, prolonged oral manipulation of solids, functional 
MRI department needs screening form filled out for this patient.     Order is STAT  
Maria De Jesus RRT notified re: Blood gas results.  
[facility]  DEPARTMENT OF SPEECH/LANGUAGE PATHOLOGY  DAILY PROGRESS NOTE  Jose Guadalupe GARSIA  2/21/2025  [id number]  DNR (do not resuscitate) [Z66]  Acute encephalopathy [G93.40]  Altered mental status, unspecified altered mental status type [R41.82]  Allergies   Allergen Reactions    Sulfa Antibiotics Other (See Comments)     unknown    Tramadol Other (See Comments)     Shaking         Pt was seen this date for dysphagia treatment.       IMPRESSION AND RECOMMENDATIONS:   Jose Guadalupe GARSIA was seen for a bedside swallowing treatment and diet tolerance monitoring.  Pt was alert and cooperative throughout assessment.  He reports he has enjoyed minced and moist meal trays and has had an easier time chewing the softer solid textures.  For today's assessment pt was positioned upright in bed and presented with a weak vocal quality and strong volitional cough.  PO trials of puree, soft/regular solids and thin liquids by cup/straw sips were given.  Prolonged mastication, slow oral A-P transit and mild-moderate oral residue was observed with regular solid textures.  Pt demonstrated improved oral clearance with soft solids and puree.  Pharyngeal swallow appeared time.  Clear vocal quality and 0 overt s/s of aspiration were observed with all PO trials given.    Recommend continue minced and moist solids (per pt request)/thin liquids with strict aspiration precautions.  SLP will continue to follow.      GOALS (current status in bold):  Short-term Goals  Timeframe for Short-term Goals: LOS  Goal 1: Pt will tolerate minced and moist solids/thin liquids without clinical evidence of aspiration 100% Goal being met. Continue   Goal 2: Pt will participate in PO trials of diet advancement with 0 clinican indications of apsiration in 5/5 trials. DNT pt reports preferring minced and moist solids at this time  Goal 3: Pt/family will demonstrate understanding of recommendations. Goal being met, continue       
ON HOLD pt is still on BIPAP per RN. RN to call MRI if pt is able to come off of BIPAP and travel to MRI for exam.  
[facility] stroke line called. Patient nursing facility records indicate that he takes eliquis..  Last dose this AM      
Occupational Therapy  [facility] ACUTE CARE OCCUPATIONAL THERAPY EVALUATION    Jose Guadalupe GARSIA, 1945, 2028/2028-A, 2/19/2025    Discharge Recommendation: Return to LTC facility     History:  [place]:  The primary encounter diagnosis was Altered mental status, unspecified altered mental status type. A diagnosis of DNR (do not resuscitate) was also pertinent to this visit.    Subjective:  Patient states: Pt was mostly nonverbal this date   Pain: Pt denied pain this date  Communication with other providers: EZEQUIEL Mclaughlin   Restrictions: General Precautions, Fall Risk, BiPAP, R AKA, Pulse Ox, Telemetry, Bed Exit Alarm     Home Setup/Prior level of function:  Pt is from [facility] and is dependent for care at baseline.     Examination:  Observation: Supine in bed upon arrival. Pt appeared lethargic and demonstrated difficulty following commands.  Vision: WFL  Hearing: WFL  Vitals: Stable vitals throughout session on room air (BiPAP removed with RN permission for session).     Body Systems and functions:  ROM: 0-80' L shoulder flexion, WFL RUE and distal LUE  Strength: 4/5 MMT all UE major muscle groups   Sensation: WFL  Tone: Normal  Coordination: WFL  Perception: WNL    Activities of Daily Living (ADLs):  Feeding: Max A   Grooming: Max A   UB bathing: Max A   LB bathing: Dependent   UB dressing: Dependent   LB dressing: Dependent   Toileting: Dependent     Cognitive and Psychosocial Functioning:  Overall cognitive status: Impaired (Pt oriented to self, appeared lethargic and demonstrated decreased alertness and poor command following this date)   Affect: Normal     Balance:   Sitting: Max A seated EOB with single UE support   Standing: Unsafe/unable this date     Functional Mobility:  Bed Mobility: Max A x2 supine to sit, Max A x2 sit to supine (assist for lifting LEs back into bed and trunk uprighting)   Transfers: Unsafe/unable this date    Ambulation: Unsafe/unable this date       AM-PAC 6 
Patient arouses to painful stimuli moaning and wincing his face no droop noted. No forced gaze. When pain applied to upper extremities he pulled both hand toward his shoulders. He moves his left foot to pain. He has a Right AKA. Official NIH unable to be accurately determined.     
Patient placed on the follow settings below. Tolerating well. Dr. Parks was at bedside and evaluated the patient to be on these settings. RN notified.     02/13/25 8922   NIV Type   NIV Started/Stopped On   Assessment   Pulse 70   Respirations 18   SpO2 100 %   Settings/Measurements   PIP Observed 11 cm H20   IPAP 10 cmH20   CPAP/EPAP 5 cmH2O   Vt (Measured) 333 mL   Rate Ordered 22   Insp Rise Time (%) 3 %   FiO2  35 %   I Time/ I Time % 0.55 s   Minute Volume (L/min) 7.5 Liters   Mask Leak (lpm) 39 lpm   Patient's Home Machine No       
Patient refused bipap  
Patient seen and examined at bedside this morning.  Admitted earlier today for AMS/acute on chronic hypoxemic hypercapnic respiratory failure.  More awake.  On BiPAP.  Pulmonology to follow.  Continue current management plan  
Physical Therapy  Facility/Department: [facility] ICU STEPDOWN  Physical Therapy Initial Assessment    Name: Jose Guadalupe GARSIA  : 1945  MRN: [id number]  Date of Service: 2025    Discharge Recommendations:    Facility for moderate post-acute rehabilitation, anticipate 1-2 hours per day and 5 days per week.           Patient Diagnosis(es): The primary encounter diagnosis was Altered mental status, unspecified altered mental status type. Diagnoses of DNR (do not resuscitate) and Tachycardia were also pertinent to this visit.  Past Medical History:  has a past medical history of Allergic rhinitis, Anxiety, BPH (benign prostatic hyperplasia), CAD (coronary artery disease), Dermatitis, GERD (gastroesophageal reflux disease), Hyperlipidemia, Hypertension, Idiopathic peripheral autonomic neuropathy, Insomnia, Meniere's disease, Osteoarthritis, Polyneuropathy, Pulmonary embolism (HCC), Urinary retention, and Vitamin B12 deficiency anemia.  Past Surgical History:  has a past surgical history that includes Appendectomy; Tonsillectomy; above knee amputation (Right); Cholecystectomy, laparoscopic (N/A, 2020); ERCP (N/A, 2020); ERCP (N/A, 5/3/2020); and ERCP (N/A, 2020).    Assessment  Body Structures, Functions, Activity Limitations Requiring Skilled Therapeutic Intervention: Decreased balance;Decreased sensation;Decreased endurance;Decreased ADL status;Decreased functional mobility ;Decreased cognition;Decreased high-level IADLs;Decreased strength;Decreased safe awareness  Therapy Prognosis: Good;Fair  Decision Making: High Complexity  Clinical Presentation: unpredictable characteristics  Requires PT Follow-Up: Yes  Activity Tolerance  Activity Tolerance: Patient tolerated evaluation without incident;Patient limited by endurance;Treatment limited secondary to decreased cognition    Plan  Physical Therapy Plan  General Plan: 2-3 times per week  Current Treatment Recommendations: Strengthening, ROM, Balance 
Physical Therapy  Name: Jose Guadalupe GARSIA MRN: [id number] :   1945   Date:  2025   Admission Date: 2025 Room:  -A   Restrictions/Precautions:  Restrictions/Precautions  Restrictions/Precautions: General Precautions, Fall Risk       Communication with other providers:  Lois RN states pt is ok to see for therapy  Subjective:  Patient states:  spoke little, but agreed to bed level therapy  Pain:   Location, Type, Intensity (0/10 to 10/10):  denies but grimaces and calls out with some scooting in bed   Objective:    Observation:  pt in low fowlers in bed.     Treatment, including education/measures:  Agreeable to therapy. Review of POC and notes. Co treatment with ELHAM Baker due to high assist level. Pt rolled  to begin trasfer to EOB. Pt was soiled and required assistance to get cleaned up. He was able to sit EOB for 7-10 min with mod/maxA to remain upright and 1UE support on bedrail. When hand was removed from bedrial he began to return to bed, but required assistance to fully complete transfer. Pt soiled himself during transfer and required MaxA x2 to roll laterally, get cleaned up, and remove / replace bed linens and padding. He was dependent to scoot up in bed. Pt became slightly SOB with prolonged side lying. He required ModA to hold side lying position.  Positioned with pillows and left with all needs met and student nurse performing care. Please see below for assist levels.        Transfers with line management of tele, male external catheter, O2  Rolling: MaxA x2  Supine to sit :MaxA x2  Sit to supine :MaxA x2  Scooting :DEP        Safety  Patient left safely in the bed, with call light/phone in reach with alarm applied. Gait belt was used for transfers and gait.    Assessment / Impression:     Patient's tolerance of treatment:  fair   Adverse Reaction: no  Significant change in status and impact:  no  Barriers to improvement:  activity tolerance limitations, strength impairments    Plan 
Pt to be transported to [place] for discharge at 1200. Glory ann called and left . Report called to [facility] supervisor and MARITZA faxed.   
Pt was taken off the bipap due to improved VBG's.  Placed pt on a 3L HFNC with 02 saturations at 95-96%.  Pt tolerating well.  
Reported VBG results to EZEQUIEL Way  
Spiritual Health History and Assessment/Progress Note  [facility]    Spiritual/Emotional Needs,  ,  ,      Name: Jose Guadalupe GARSIA MRN: [id number]    Age: 79 y.o.     Sex: male   Language: English   Buddhism: Adventist   Acute encephalopathy     Date: 2/24/2025            Total Time Calculated: 3 min              Spiritual Assessment continued in [facility] ICU STEPDOWN        Referral/Consult From: Rounding   Encounter Overview/Reason: Spiritual/Emotional Needs  Service Provided For: Patient    Concepción, Belief, Meaning:   Patient identifies as spiritual, is connected with a concepción tradition or spiritual practice, and has beliefs or practices that help with coping during difficult times  Family/Friends No family/friends present      Importance and Influence:  Patient has spiritual/personal beliefs that influence decisions regarding their health  Family/Friends No family/friends present    Community:  Patient feels well-supported. Support system includes: Friends  Family/Friends No family/friends present    Assessment and Plan of Care:     Patient Interventions include: Facilitated expression of thoughts and feelings  Family/Friends Interventions include: No family/friends present    Patient Plan of Care: Spiritual Care available upon further referral  Family/Friends Plan of Care: No family/friends present    Electronically signed by Chaplain Jasmin on 2/24/2025 at 4:48 PM   
[facility]  DEPARTMENT OF SPEECH/LANGUAGE PATHOLOGY  DAILY PROGRESS NOTE  Jose Guadalupe GARSIA  2/24/2025  [id number]  DNR (do not resuscitate) [Z66]  Acute encephalopathy [G93.40]  Altered mental status, unspecified altered mental status type [R41.82]  Allergies   Allergen Reactions    Sulfa Antibiotics Other (See Comments)     unknown    Tramadol Other (See Comments)     Shaking         Pt was seen this date for dysphagia treatment.       IMPRESSION AND RECOMMENDATIONS: Jose Guadalupe GARSIA was seen for a bedside swallowing treatment and diet tolerance monitoring. EZEQUIEL Way reports pt with no difficulty taking meds this AM. Jose Guadalupe GARSIA was positioned upright in bed. Pt cooperative and agreeable. He appears lethargic and requires feeding assistance. He is on 2L O2 via NC. He reports no difficulty with minced and moist meals. States he has a reduced appetite.     Jose Guadalupe GARSIA consumed trials of thin liquid via cup/straw, puree, and soft solid during this visit. Pt presents with evidence of oropharyngeal dysphagia characterized by prolonged bolus preparation/mastication, slow a/pt transit, reduced oral clearance, and questionable timeliness of the pharyngeal swallow. Mild-moderate oral residue noted with soft solids. Pt clears with liquid/puree trial. Noted to take consecutive sips of thin liquid via straw. No overt s/sx of penetration/aspiration noted with any consistency. SLP provided education on recommendation to continue current diet texture and POC. Pt verbalizes understanding.     Recommend continue minced and moist solids/thin liquids with strict aspiration precautions. Pt requires assistance for set-up/feeding. Administer meds in puree. SLP will continue to follow.      GOALS (current status in bold):  Short-term Goals  Timeframe for Short-term Goals: LOS  Goal 1: Pt will tolerate minced and moist solids/thin liquids without clinical evidence of aspiration 100% Goal being met, 
[facility]  DEPARTMENT OF SPEECH/LANGUAGE PATHOLOGY  ATTEMPT NOTE  Jose Guadalupe GARSIA  2/13/2025  [id number]    SLP attempted to see Jose Guadalupe GARSIA for a clinical bedside swallow evaluation. Pt currently on BiPAP and unable to participate in PO trials at this time. SLP to re-attempt at a later date/time.       SLP attempted to see Jose Guadalupe GARSIA x2 this PM. EZEQUIEL Garza reports pt still currently on BiPAP, however, plan to trial weaning shortly. SLP to re-attempt as able.     Jennifer Castro MA, CF-SLP 2/13/2025   
VBG Results   Latest Reference Range & Units 02/13/25 15:54   pH, Jonnathan 7.320 - 7.430  7.324   pCO2, Jonnathan 38 - 54 mm Hg 86.9 (H)   pO2, Jonnathan 23 - 48 mm Hg 22.1 (L)   Bicarbonate, Venous 22 - 29 mmol/L 44.2 (H)   Positive Base Excess, Jonnathan 0 - 3 mmol/L 14.5 (H)   (H): Data is abnormally high  (L): Data is abnormally low  
VBG results sent to Dr Echols   
pulmonary      SUBJECTIVE:  he is not sob at rest but remains confused     OBJECTIVE    VITALS:  /60   Pulse (!) 110   Temp 98.3 °F (36.8 °C) (Oral)   Resp 29   Ht 1.727 m (5' 8\")   Wt 68.2 kg (150 lb 6.4 oz)   SpO2 95%   BMI 22.87 kg/m²   HEAD AND FACE EXAM:  No throat injection, no active exudate,no thrush  NECK EXAM;No JVD, no masses, symmetrical  CHEST EXAM; Expansion equal and symmetrical, no masses  LUNG EXAM; Good breath sounds bilaterally. There are expiratory wheezes both lungs, there are crackles at both lung bases  CARDIOVASCULAR EXAM: Positive S1 and S2, no S3 or S4, no clicks ,no murmurs  RIGHT AND LEFT LOWER EXTRIMITY EXAM: No edema, no swelling, no inflamation  CNS EXAM: Alert and oriented X3          LABS   Lab Results   Component Value Date    WBC 6.3 02/20/2025    HGB 9.9 (L) 02/20/2025    HCT 27.9 (L) 02/20/2025    .2 (H) 02/20/2025     02/20/2025     Lab Results   Component Value Date    CREATININE 0.3 (L) 02/20/2025    BUN 6 (L) 02/20/2025     02/20/2025    K 3.4 (L) 02/20/2025    CL 97 (L) 02/20/2025    CO2 30 02/20/2025     Lab Results   Component Value Date    INR 1.3 02/12/2025    PROTIME 16.3 (H) 02/12/2025        No results found for: \"MAGNESIUM\", \"PHOS\"   No results for input(s): \"PH\", \"PO2ART\", \"IRX5UNK\", \"HCO3\", \"BEART\", \"O2SAT\" in the last 72 hours.      Wt Readings from Last 3 Encounters:   02/13/25 68.2 kg (150 lb 6.4 oz)   03/02/21 88.5 kg (195 lb)   02/04/21 88.5 kg (195 lb)               ASSESMENT  Ac on ch resp failure  Al  Ac bronchospasm        PLAN  Bd rx  Pap at night    2/20/2025  Haresh Herrera MD, M.D.  
pulmonary      SUBJECTIVE:  he was on pap last nite and currently on o2 and feels a little better     OBJECTIVE    VITALS:  /64   Pulse 82   Temp 98.5 °F (36.9 °C) (Oral)   Resp (!) 33   Ht 1.727 m (5' 8\")   Wt 68.2 kg (150 lb 6.4 oz)   SpO2 100%   BMI 22.87 kg/m²   HEAD AND FACE EXAM:  No throat injection, no active exudate,no thrush  NECK EXAM;No JVD, no masses, symmetrical  CHEST EXAM; Expansion equal and symmetrical, no masses  LUNG EXAM; Good breath sounds bilaterally. There are expiratory wheezes both lungs, there are crackles at both lung bases  CARDIOVASCULAR EXAM: Positive S1 and S2, no S3 or S4, no clicks ,no murmurs  RIGHT AND LEFT LOWER EXTRIMITY EXAM: No edema, no swelling, no inflamation  CNS EXAM: Alert and oriented X3          LABS   Lab Results   Component Value Date    WBC 9.7 02/14/2025    HGB 12.3 (L) 02/14/2025    HCT 41.1 (L) 02/14/2025    .7 (H) 02/14/2025     02/13/2025     Lab Results   Component Value Date    CREATININE 0.4 (L) 02/14/2025    BUN 11 02/14/2025     (H) 02/14/2025    K 3.9 02/14/2025    CL 99 02/14/2025    CO2 38 (H) 02/14/2025     Lab Results   Component Value Date    INR 1.3 02/12/2025    PROTIME 16.3 (H) 02/12/2025        No results found for: \"MAGNESIUM\", \"PHOS\"   No results for input(s): \"PH\", \"PO2ART\", \"SNU5BWE\", \"HCO3\", \"BEART\", \"O2SAT\" in the last 72 hours.      Wt Readings from Last 3 Encounters:   02/13/25 68.2 kg (150 lb 6.4 oz)   03/02/21 88.5 kg (195 lb)   02/04/21 88.5 kg (195 lb)               ASSESMENT  Ac on ch resp failure  Ac bronchospasm  Al  Small ken pl effusion  Bibasilar pulm atelectasis        PLAN  Bd rx  Received one dose solumedrol yeaterday  Cont pap at nite and o2 during the day    2/15/2025  Haresh Herrera MD, M.D.  
pulmonary      SUBJECTIVE:  no change and no sob at rest,pap rx not on and on o2     OBJECTIVE    VITALS:  BP (!) 130/49   Pulse 70   Temp 98.3 °F (36.8 °C) (Oral)   Resp 21   Ht 1.727 m (5' 8\")   Wt 68 kg (150 lb)   SpO2 100%   BMI 22.81 kg/m²   HEAD AND FACE EXAM:  No throat injection, no active exudate,no thrush  NECK EXAM;No JVD, no masses, symmetrical  CHEST EXAM; Expansion equal and symmetrical, no masses  LUNG EXAM; Good breath sounds bilaterally. There are expiratory wheezes both lungs, there are crackles at both lung bases  CARDIOVASCULAR EXAM: Positive S1 and S2, no S3 or S4, no clicks ,no murmurs  RIGHT AND LEFT LOWER EXTRIMITY EXAM: No           LABS   Lab Results   Component Value Date    WBC 6.3 02/22/2025    HGB 11.1 (L) 02/22/2025    HCT 30.0 (L) 02/22/2025    .7 (H) 02/22/2025     02/22/2025     Lab Results   Component Value Date    CREATININE 0.5 (L) 02/22/2025    BUN 6 (L) 02/22/2025     02/22/2025    K 3.7 02/22/2025    CL 98 (L) 02/22/2025    CO2 34 (H) 02/22/2025     Lab Results   Component Value Date    INR 1.3 02/12/2025    PROTIME 16.3 (H) 02/12/2025        No results found for: \"MAGNESIUM\", \"PHOS\"   No results for input(s): \"PH\", \"PO2ART\", \"SLZ3NOK\", \"HCO3\", \"BEART\", \"O2SAT\" in the last 72 hours.      Wt Readings from Last 3 Encounters:   02/21/25 68 kg (150 lb)   03/02/21 88.5 kg (195 lb)   02/04/21 88.5 kg (195 lb)               ASSESMENT  Ac on ch resp failure  Ac bronchospasm  Al  Bibasilar pulm atx/pneumonia        PLAN  Bd rx  O2 adm  On unasyn    2/24/2025  Haresh Herrera MD, MRONAL.  
pulmonary      SUBJECTIVE:  no significant change     OBJECTIVE    VITALS:  BP (!) 141/62   Pulse 60   Temp 98.3 °F (36.8 °C) (Oral)   Resp 20   Ht 1.727 m (5' 8\")   Wt 68 kg (150 lb)   SpO2 97%   BMI 22.81 kg/m²   HEAD AND FACE EXAM:  No throat injection, no active exudate,no thrush  NECK EXAM;No JVD, no masses, symmetrical  CHEST EXAM; Expansion equal and symmetrical, no masses  LUNG EXAM; Good breath sounds bilaterally. There are expiratory wheezes both lungs, there are crackles at both lung bases  CARDIOVASCULAR EXAM: Positive S1 and S2, no S3 or S4, no clicks ,no murmurs  RIGHT AND LEFT LOWER EXTRIMITY EXAM: No edema, no swelling, no inflamation  .          LABS   Lab Results   Component Value Date    WBC 6.3 02/22/2025    HGB 11.1 (L) 02/22/2025    HCT 30.0 (L) 02/22/2025    .7 (H) 02/22/2025     02/22/2025     Lab Results   Component Value Date    CREATININE 0.5 (L) 02/22/2025    BUN 6 (L) 02/22/2025     02/22/2025    K 3.7 02/22/2025    CL 98 (L) 02/22/2025    CO2 34 (H) 02/22/2025     Lab Results   Component Value Date    INR 1.3 02/12/2025    PROTIME 16.3 (H) 02/12/2025        No results found for: \"MAGNESIUM\", \"PHOS\"   No results for input(s): \"PH\", \"PO2ART\", \"EOS7SOD\", \"HCO3\", \"BEART\", \"O2SAT\" in the last 72 hours.      Wt Readings from Last 3 Encounters:   02/21/25 68 kg (150 lb)   03/02/21 88.5 kg (195 lb)   02/04/21 88.5 kg (195 lb)               ASSESMENT  Ac on ch resp failure  Ac copd  Pneumonia  arash        PLAN  Bd rx  Unasyn added  Bipap as pt allows    2/23/2025  Haresh Herrera MD, M.D.  
pulmonary      SUBJECTIVE:  no sob and on op2     OBJECTIVE    VITALS:  /73   Pulse 74   Temp 98.3 °F (36.8 °C) (Oral)   Resp 16   Ht 1.727 m (5' 8\")   Wt 67.9 kg (149 lb 9.7 oz)   SpO2 94%   BMI 22.75 kg/m²   HEAD AND FACE EXAM:  No throat injection, no active exudate,no thrush  NECK EXAM;No JVD, no masses, symmetrical  CHEST EXAM; Expansion equal and symmetrical, no masses  LUNG EXAM; Good breath sounds bilaterally. There are expiratory wheezes both lungs, there are crackles at both lung bases  CARDIOVASCULAR EXAM: Positive S1 and S2, no S3 or S4, no clicks ,no murmurs  RIGHT AND LEFT LOWER EXTRIMITY EXAM: No edema, no swelling, no inflamation  .          LABS   Lab Results   Component Value Date    WBC 4.0 02/26/2025    HGB 9.6 (L) 02/26/2025    HCT 27.8 (L) 02/26/2025    .4 (H) 02/26/2025     02/26/2025     Lab Results   Component Value Date    CREATININE 0.3 (L) 02/26/2025    BUN 4 (L) 02/26/2025     02/26/2025    K 3.5 02/26/2025    CL 97 (L) 02/26/2025    CO2 36 (H) 02/26/2025     Lab Results   Component Value Date    INR 1.3 02/12/2025    PROTIME 16.3 (H) 02/12/2025          Lab Results   Component Value Date/Time    PHOS 2.3 02/26/2025 04:32 AM    PHOS 2.5 02/25/2025 05:47 AM      No results for input(s): \"PH\", \"PO2ART\", \"SFY5CTW\", \"HCO3\", \"BEART\", \"O2SAT\" in the last 72 hours.      Wt Readings from Last 3 Encounters:   02/26/25 67.9 kg (149 lb 9.7 oz)   03/02/21 88.5 kg (195 lb)   02/04/21 88.5 kg (195 lb)               ASSESMENT  Ac on ch resp failure  Al  Ac bronchospasm, p;tu coipd        PLAN  Bd rx  O2 adm  Bipap as pt  allows    2/26/2025  Haresh Herrera MD, M.D.  
pulmonary      SUBJECTIVE:  on bipap[ when I saw shane early am and on 16/5 with 35% fio2     OBJECTIVE    VITALS:  /68   Pulse 66   Temp 97.8 °F (36.6 °C) (Oral)   Resp 22   Ht 1.727 m (5' 8\")   Wt 68.2 kg (150 lb 6.4 oz)   SpO2 98%   BMI 22.87 kg/m²   HEAD AND FACE EXAM:  No throat injection, no active exudate,no thrush  NECK EXAM;No JVD, no masses, symmetrical  CHEST EXAM; Expansion equal and symmetrical, no masses  LUNG EXAM; Good breath sounds bilaterally. There are expiratory wheezes both lungs, there are crackles at both lung bases  CARDIOVASCULAR EXAM: Positive S1 and S2, no S3 or S4, no clicks ,no murmurs  RIGHT AND LEFT LOWER EXTRIMITY EXAM: No edema, no swelling, no inflamation  CNS EXAM: Alert and oriented X3          LABS   Lab Results   Component Value Date    WBC 9.7 02/14/2025    HGB 12.3 (L) 02/14/2025    HCT 41.1 (L) 02/14/2025    .7 (H) 02/14/2025     02/13/2025     Lab Results   Component Value Date    CREATININE 0.4 (L) 02/14/2025    BUN 11 02/14/2025     (H) 02/14/2025    K 3.9 02/14/2025    CL 99 02/14/2025    CO2 38 (H) 02/14/2025     Lab Results   Component Value Date    INR 1.3 02/12/2025    PROTIME 16.3 (H) 02/12/2025        No results found for: \"MAGNESIUM\", \"PHOS\"   No results for input(s): \"PH\", \"PO2ART\", \"BBQ7MQJ\", \"HCO3\", \"BEART\", \"O2SAT\" in the last 72 hours.      Wt Readings from Last 3 Encounters:   02/13/25 68.2 kg (150 lb 6.4 oz)   03/02/21 88.5 kg (195 lb)   02/04/21 88.5 kg (195 lb)               ASSESMENT  Ac on ch resp failure  Ac bronchospasm  Al  Small ken pl effusiobn   Ken ll pulm atx        PLAN  Cont duonmeb  Adfequate o2 adm  Pap at night    2/17/2025  Haresh Herrera MD, MRONAL.  
pulmonary      SUBJECTIVE:  on o2 and sleeping and no sob at rest     OBJECTIVE    VITALS:  BP (!) 130/59   Pulse 66   Temp 98.2 °F (36.8 °C) (Oral)   Resp 22   Ht 1.727 m (5' 8\")   Wt 68.2 kg (150 lb 6.4 oz)   SpO2 99%   BMI 22.87 kg/m²   HEAD AND FACE EXAM:  No throat injection, no active exudate,no thrush  NECK EXAM;No JVD, no masses, symmetrical  CHEST EXAM; Expansion equal and symmetrical, no masses  LUNG EXAM; Good breath sounds bilaterally. There are expiratory wheezes both lungs, there are crackles at both lung bases  CARDIOVASCULAR EXAM: Positive S1 and S2, no S3 or S4, no clicks ,no murmurs  RIGHT AND LEFT LOWER EXTRIMITY EXAM: No           LABS   Lab Results   Component Value Date    WBC 9.7 02/14/2025    HGB 12.3 (L) 02/14/2025    HCT 41.1 (L) 02/14/2025    .7 (H) 02/14/2025     02/13/2025     Lab Results   Component Value Date    CREATININE 0.4 (L) 02/14/2025    BUN 11 02/14/2025     (H) 02/14/2025    K 3.9 02/14/2025    CL 99 02/14/2025    CO2 38 (H) 02/14/2025     Lab Results   Component Value Date    INR 1.3 02/12/2025    PROTIME 16.3 (H) 02/12/2025        No results found for: \"MAGNESIUM\", \"PHOS\"   No results for input(s): \"PH\", \"PO2ART\", \"JZX9JHE\", \"HCO3\", \"BEART\", \"O2SAT\" in the last 72 hours.      Wt Readings from Last 3 Encounters:   02/13/25 68.2 kg (150 lb 6.4 oz)   03/02/21 88.5 kg (195 lb)   02/04/21 88.5 kg (195 lb)               ASSESMENT  Ac on ch resp failure  Al  Ac bronchospasm  Bibasilar pulm atelectasis  Small ken pl effusion        PLAN  Bronchodilator rx  He received one dose solumedrol  O2 adm    2/16/2025  Hareshclarence Herrera MD, M.D.  
pulmonary      SUBJECTIVE:  remains confused and refuses bipap at night off and on,other notes read and concern for swallowing noted     OBJECTIVE    VITALS:  /64   Pulse 75   Temp 97.9 °F (36.6 °C) (Oral)   Resp 22   Ht 1.727 m (5' 8\")   Wt 68 kg (150 lb)   SpO2 95%   BMI 22.81 kg/m²   HEAD AND FACE EXAM:  No throat injection, no active exudate,no thrush  NECK EXAM;No JVD, no masses, symmetrical  CHEST EXAM; Expansion equal and symmetrical, no masses  LUNG EXAM; Good breath sounds bilaterally. There are expiratory wheezes both lungs, there are crackles at both lung bases  CARDIOVASCULAR EXAM: Positive S1 and S2, no S3 or S4, no clicks ,no murmurs  RIGHT AND LEFT LOWER EXTRIMITY EXAM: No edema, no swelling, no inflamation            LABS   Lab Results   Component Value Date    WBC 6.3 02/22/2025    HGB 11.1 (L) 02/22/2025    HCT 30.0 (L) 02/22/2025    .7 (H) 02/22/2025     02/22/2025     Lab Results   Component Value Date    CREATININE 0.5 (L) 02/22/2025    BUN 6 (L) 02/22/2025     02/22/2025    K 3.7 02/22/2025    CL 98 (L) 02/22/2025    CO2 34 (H) 02/22/2025     Lab Results   Component Value Date    INR 1.3 02/12/2025    PROTIME 16.3 (H) 02/12/2025        No results found for: \"MAGNESIUM\", \"PHOS\"   No results for input(s): \"PH\", \"PO2ART\", \"QIF1GYG\", \"HCO3\", \"BEART\", \"O2SAT\" in the last 72 hours.      Wt Readings from Last 3 Encounters:   02/21/25 68 kg (150 lb)   03/02/21 88.5 kg (195 lb)   02/04/21 88.5 kg (195 lb)               ASSESMENT  Ac on ch resp failure  Ac copd  Worsening mentations and concern for aspiration        PLAN  Bd rx  Continue to try bipap at night  O2 during the day  Maintain aspiration precaution    2/22/2025  Haresh Herrera MD, MRONAL.  
pulmonary      SUBJECTIVE:  seen earlier and sleeping and no sob     OBJECTIVE    VITALS:  /72   Pulse 99   Temp 97.5 °F (36.4 °C) (Oral)   Resp 19   Ht 1.727 m (5' 8\")   Wt 68.2 kg (150 lb 6.4 oz)   SpO2 98%   BMI 22.87 kg/m²   HEAD AND FACE EXAM:  No throat injection, no active exudate,no thrush  NECK EXAM;No JVD, no masses, symmetrical  CHEST EXAM; Expansion equal and symmetrical, no masses  LUNG EXAM; Good breath sounds bilaterally. There are expiratory wheezes both lungs, there are crackles at both lung bases  CARDIOVASCULAR EXAM: Positive S1 and S2, no S3 or S4, no clicks ,no murmurs  RIGHT AND LEFT LOWER EXTRIMITY EXAM: No edema, no swelling, no inflamation  .          LABS   Lab Results   Component Value Date    WBC 5.4 02/17/2025    HGB 10.7 (L) 02/17/2025    HCT 30.1 (L) 02/17/2025    .1 (H) 02/17/2025     02/17/2025     Lab Results   Component Value Date    CREATININE 0.4 (L) 02/17/2025    BUN 7 02/17/2025     02/17/2025    K 3.1 (L) 02/17/2025    CL 96 (L) 02/17/2025    CO2 30 02/17/2025     Lab Results   Component Value Date    INR 1.3 02/12/2025    PROTIME 16.3 (H) 02/12/2025        No results found for: \"MAGNESIUM\", \"PHOS\"   No results for input(s): \"PH\", \"PO2ART\", \"HBK0FXP\", \"HCO3\", \"BEART\", \"O2SAT\" in the last 72 hours.      Wt Readings from Last 3 Encounters:   02/13/25 68.2 kg (150 lb 6.4 oz)   03/02/21 88.5 kg (195 lb)   02/04/21 88.5 kg (195 lb)               ASSESMENT  Ac on ch resp failure  Al  Ac bronchospasm        PLAN  Bd rx  O2 adm as needed  Pap at nite    2/19/2025  Haresh Herrera MD, MRONAL.  
pulmonary      SUBJECTIVE:  seen early am and doling fair     OBJECTIVE    VITALS:  /77   Pulse 83   Temp 97.3 °F (36.3 °C) (Axillary)   Resp 27   Ht 1.727 m (5' 8\")   Wt 68.2 kg (150 lb 6.4 oz)   SpO2 100%   BMI 22.87 kg/m²   HEAD AND FACE EXAM:  No throat injection, no active exudate,no thrush  NECK EXAM;No JVD, no masses, symmetrical  CHEST EXAM; Expansion equal and symmetrical, no masses  LUNG EXAM; Good breath sounds bilaterally. There are expiratory wheezes both lungs, there are crackles at both lung bases  CARDIOVASCULAR EXAM: Positive S1 and S2, no S3 or S4, no clicks ,no murmurs  RIGHT AND LEFT LOWER EXTRIMITY EXAM: No edema, no swelling, no inflamation  CNS EXAM: Alert and oriented X3          LABS   Lab Results   Component Value Date    WBC 5.4 02/17/2025    HGB 10.7 (L) 02/17/2025    HCT 30.1 (L) 02/17/2025    .1 (H) 02/17/2025     02/17/2025     Lab Results   Component Value Date    CREATININE 0.4 (L) 02/17/2025    BUN 7 02/17/2025     02/17/2025    K 3.1 (L) 02/17/2025    CL 96 (L) 02/17/2025    CO2 30 02/17/2025     Lab Results   Component Value Date    INR 1.3 02/12/2025    PROTIME 16.3 (H) 02/12/2025        No results found for: \"MAGNESIUM\", \"PHOS\"   No results for input(s): \"PH\", \"PO2ART\", \"THD5GRQ\", \"HCO3\", \"BEART\", \"O2SAT\" in the last 72 hours.      Wt Readings from Last 3 Encounters:   02/13/25 68.2 kg (150 lb 6.4 oz)   03/02/21 88.5 kg (195 lb)   02/04/21 88.5 kg (195 lb)               ASSESMENT  Ac on ch resp failure  Al  Ac bronchospasm  Bibasilar pulm atelectasis  Small ken pl effusion        PLAN  No need for pl tap  Bd rx  O2 adm  Refuses to use pap rx    2/18/2025  Haresh Herrera MD, MRONAL.  
pulmonary      SUBJECTIVE:  seen early am and no change     OBJECTIVE    VITALS:  /77   Pulse 90   Temp 97.9 °F (36.6 °C) (Oral)   Resp 23   Ht 1.727 m (5' 8\")   Wt 68.2 kg (150 lb 6.4 oz)   SpO2 95%   BMI 22.87 kg/m²   HEAD AND FACE EXAM:  No throat injection, no active exudate,no thrush  NECK EXAM;No JVD, no masses, symmetrical  CHEST EXAM; Expansion equal and symmetrical, no masses  LUNG EXAM; Good breath sounds bilaterally. There are expiratory wheezes both lungs, there are crackles at both lung bases  CARDIOVASCULAR EXAM: Positive S1 and S2, no S3 or S4, no clicks ,no murmurs  RIGHT AND LEFT LOWER EXTRIMITY EXAM: No edema, no swelling, no inflamation  .          LABS   Lab Results   Component Value Date    WBC 6.3 02/20/2025    HGB 9.9 (L) 02/20/2025    HCT 27.9 (L) 02/20/2025    .2 (H) 02/20/2025     02/20/2025     Lab Results   Component Value Date    CREATININE 0.3 (L) 02/20/2025    BUN 6 (L) 02/20/2025     02/20/2025    K 3.4 (L) 02/20/2025    CL 97 (L) 02/20/2025    CO2 30 02/20/2025     Lab Results   Component Value Date    INR 1.3 02/12/2025    PROTIME 16.3 (H) 02/12/2025        No results found for: \"MAGNESIUM\", \"PHOS\"   No results for input(s): \"PH\", \"PO2ART\", \"HDK7XRD\", \"HCO3\", \"BEART\", \"O2SAT\" in the last 72 hours.      Wt Readings from Last 3 Encounters:   02/13/25 68.2 kg (150 lb 6.4 oz)   03/02/21 88.5 kg (195 lb)   02/04/21 88.5 kg (195 lb)               ASSESMENT  Ac on ch resp failure  Ac bronchospasm  arash        PLAN  Bd rx  Pap at nite  If dc then fu as outpt    2/21/2025  Haresh Herrera MD, M.D.  
pulmonary      SUBJECTIVE:  seen early am and no change     OBJECTIVE    VITALS:  BP (!) 117/51   Pulse 78   Temp 98.3 °F (36.8 °C) (Oral)   Resp 21   Ht 1.727 m (5' 8\")   Wt 68 kg (150 lb)   SpO2 97%   BMI 22.81 kg/m²   HEAD AND FACE EXAM:  No throat injection, no active exudate,no thrush  NECK EXAM;No JVD, no masses, symmetrical  CHEST EXAM; Expansion equal and symmetrical, no masses  LUNG EXAM; Good breath sounds bilaterally. There are expiratory wheezes both lungs, there are crackles at both lung bases  CARDIOVASCULAR EXAM: Positive S1 and S2, no S3 or S4, no clicks ,no murmurs  RIGHT AND LEFT LOWER EXTRIMITY EXAM: No edema, no swelling, no inflamation  CNS EXAM: Alert and oriented X3          LABS   Lab Results   Component Value Date    WBC 4.3 02/25/2025    HGB 9.2 (L) 02/25/2025    HCT 25.4 (L) 02/25/2025    .9 (H) 02/25/2025     02/25/2025     Lab Results   Component Value Date    CREATININE 0.2 (L) 02/25/2025    BUN 5 (L) 02/25/2025     02/25/2025    K 3.6 02/25/2025     02/25/2025    CO2 34 (H) 02/25/2025     Lab Results   Component Value Date    INR 1.3 02/12/2025    PROTIME 16.3 (H) 02/12/2025          Lab Results   Component Value Date/Time    PHOS 2.5 02/25/2025 05:47 AM      No results for input(s): \"PH\", \"PO2ART\", \"PRY7HYR\", \"HCO3\", \"BEART\", \"O2SAT\" in the last 72 hours.      Wt Readings from Last 3 Encounters:   02/21/25 68 kg (150 lb)   03/02/21 88.5 kg (195 lb)   02/04/21 88.5 kg (195 lb)               ASSESMENT  Ac on ch resp failure  Ac copd  Al  Small ken pl effusion  Biobasilar pulm atelectasis        PLAN  Cpm  Started back on bipap as pco2 76 mm hg.sometimes he wears it and sometimes he refuses  Cont bronchodilator rx    2/25/2025  Haresh Herrera MD, M.D.  
cholecystectomy-5/2020        Diet ADULT DIET; Dysphagia - Soft and Bite Sized   DVT Prophylaxis [] Lovenox, []  Heparin, [x] SCDs, [] Ambulation,  [] Eliquis, [] Xarelto  [] Coumadin   Code Status DNR-CCA   Disposition From: SNF  Expected Disposition: Long-term care facility  Estimated Date of Discharge: 2/21  Patient requires continued admission due to acute on chronic respiratory failure   Surrogate Decision Maker/ POA       Personally reviewed Lab Studies and Imaging           Subjective:     Chief Complaint:       He states he is doing ok  No complaints at this time      Review of Systems:      Pertinent positives and negatives discussed in HPI    Objective:     Intake/Output Summary (Last 24 hours) at 2/19/2025 1233  Last data filed at 2/19/2025 0907  Gross per 24 hour   Intake 360 ml   Output 200 ml   Net 160 ml      Vitals:   Vitals:    02/19/25 1127 02/19/25 1137 02/19/25 1138 02/19/25 1141   BP:    110/69   Pulse: 98 91 91 92   Resp:  20 24 22   Temp:    96.9 °F (36.1 °C)   TempSrc:    Axillary   SpO2:  100% 100% 100%   Weight:       Height:             Physical Exam:      General: NAD, laying flat in bed  Eyes: no discharge  HENT: NCAT  Cardiovascular: Regular rate.  Respiratory: on bipap, seems to have fair air exchagne  Gastrointestinal: Soft, non tender  Genitourinary: no suprapubic tenderness  Musculoskeletal: No edema.  Status post right AKA  Skin: warm, dry  Neuro: awake and alert, oriented to name and birthday  Psych: appears appropriate        Medications:   Medications:    insulin lispro  0-4 Units SubCUTAneous 4x Daily AC & HS    amLODIPine  2.5 mg Oral Nightly    insulin glargine  5 Units SubCUTAneous Daily    finasteride  5 mg Oral Daily    tamsulosin  0.4 mg Oral Daily    memantine  10 mg Oral BID    Vitamin D  5,000 Units Oral Daily    sertraline  25 mg Oral Daily    mirtazapine  15 mg Oral Nightly    apixaban  5 mg Oral BID    albuterol  2.5 mg Nebulization TID RT    sodium chloride flush  
nursing aware.             Assessment / Impression:    Patient's tolerance of treatment: fair +  Adverse Reaction: None  Significant change in status and impact: Improved from initial evaluation  Barriers to improvement: weakness, decreased activity tolerance and endurance.       Plan for Next Session:    Continue with OT POC      Time in:  1006  Time out:  1046  Timed treatment minutes:  40  Total treatment time:  40      Electronically signed by:    QUINTEN Silvestre COTA/L [id number]    2/26/2025, 12:23 PM        
   sertraline  25 mg Oral Daily    mirtazapine  15 mg Oral Nightly    apixaban  5 mg Oral BID    albuterol  2.5 mg Nebulization TID RT    sodium chloride flush  5-40 mL IntraVENous 2 times per day    aspirin  81 mg Oral Daily    Or    aspirin  300 mg Rectal Daily    atorvastatin  40 mg Oral Nightly      Infusions:    dextrose      sodium chloride 10 mL/hr at 02/24/25 0403     PRN Meds: glucose, 4 tablet, PRN  dextrose bolus, 125 mL, PRN   Or  dextrose bolus, 250 mL, PRN  glucagon (rDNA), 1 mg, PRN  dextrose, , Continuous PRN  acetaminophen, 650 mg, Q4H PRN  potassium chloride, 40 mEq, PRN   Or  potassium alternative oral replacement, 40 mEq, PRN   Or  potassium chloride, 10 mEq, PRN  ipratropium 0.5 mg-albuterol 2.5 mg, 1 Dose, Q4H PRN  sodium chloride flush, 5-40 mL, PRN  sodium chloride, , PRN  ondansetron, 4 mg, Q8H PRN   Or  ondansetron, 4 mg, Q6H PRN  polyethylene glycol, 17 g, Daily PRN        Labs and Imaging   XR CHEST PORTABLE    Result Date: 2/13/2025  EXAMINATION: XR CHEST PORTABLE DATE OF EXAM:  2/13/2025 0:04 DEMOGRAPHICS: 79 years old Male INDICATION: altered mental status TECHNIQUE: Single AP portable chest radiograph was obtained. FINDINGS: No support devices visualized. Cardiomediastinal silhouette is partially obscured but does not does not demonstrate an acute abnormality. Aortic calcifications. No acute osseous abnormality. Possible small bilateral pleural effusions. No visible pneumothorax. Low lung volumes. Bibasilar opacities. IMPRESSION: 1.  Low lung volumes. Bibasilar opacities may reflect atelectasis and/or airspace disease. Possible small bilateral pleural effusions. This dictation was created with voice recognition software.  While attempts have  been made to review the dictation as it is transcribed, on occasion the spoken word can be misinterpreted by the technology leading to omissions or inappropriate words, phrases or sentences.  Dictated and Electronically Signed By: Han Quispe MD 
DEMOGRAPHICS: 79 years old Male INDICATION: Stroke Symptoms COMPARISON: No existing relevant imaging study corresponding to the same anatomical region is available. TECHNIQUE: Contiguous axial slices of the head were submitted without IV contrast. Additional coronal reformatted images were submitted for review. DOSE OPTIMIZATION: CT radiation dose optimization techniques (automated exposure  control, and use of iterative reconstruction techniques, or adjustment of the mA and/or kV according to patient size) were used to limit patient radiation dose. FINDINGS: Moderate diffuse atrophy. There is mild scattered periventricular white matter hypoattenuation which most likely reflects chronic small vessel ischemic changes.  The gray/white matter differentiation is preserved. There is no CT evidence of a large acute or recent subacute territorial infarct.  The basal cisterns are not effaced. There is no mass effect or midline shift. There is no acute intracranial hemorrhage or extra-axial fluid collections. The visualized paranasal sinuses and mastoid air cells are clear. Regional soft tissues and bony calvarium are unremarkable. IMPRESSION: 1.  No acute intracranial findings.  There is no evidence for hemorrhage, mass effect, or acute large territory infarct. 2.  Atrophy and chronic small vessel ischemic changes. This dictation was created with voice recognition software.  While attempts have  been made to review the dictation as it is transcribed, on occasion the spoken word can be misinterpreted by the technology leading to omissions or inappropriate words, phrases or sentences.  Dictated and Electronically Signed By: Trae Rojo DO 2/12/2025 22:49          CBC:   Recent Labs     02/14/25  1001   WBC 9.7   HGB 12.3*     BMP:    Recent Labs     02/14/25  1001   *   K 3.9   CL 99   CO2 38*   BUN 11   CREATININE 0.4*   GLUCOSE 172*     Hepatic:   No results for input(s): \"AST\", \"ALT\", \"BILITOT\", \"ALKPHOS\" in 
02/16/2025 06:55 AM    AMORPHOUS MODERATE 04/24/2022 01:00 PM     Urine Cultures: No results found for: \"LABURIN\"  Blood Cultures: No results found for: \"BC\"  No results found for: \"BLOODCULT2\"  Organism: No results found for: \"ORG\"      Electronically signed by Trini Kennedy MD on 2/20/2025 at 2:36 PM

## 2025-02-27 ENCOUNTER — HOSPITAL ENCOUNTER (OUTPATIENT)
Age: 80
Setting detail: SPECIMEN
Discharge: HOME | End: 2025-02-27

## 2025-02-27 LAB
ALBUMIN SERPL-MCNC: 3.2 G/DL (ref 3.4–5)
ALBUMIN/GLOB SERPL: 1.5 {RATIO} (ref 1.1–2.2)
ALP SERPL-CCNC: 95 U/L (ref 40–129)
ALT SERPL-CCNC: 16 U/L (ref 10–40)
ANION GAP SERPL CALCULATED.3IONS-SCNC: 7 MMOL/L (ref 9–17)
AST SERPL-CCNC: 21 U/L (ref 15–37)
BILIRUB SERPL-MCNC: 1.1 MG/DL (ref 0–1)
BUN SERPL-MCNC: 4 MG/DL (ref 7–20)
CALCIUM SERPL-MCNC: 9.2 MG/DL (ref 8.3–10.6)
CHLORIDE SERPL-SCNC: 99 MMOL/L (ref 99–110)
CO2 SERPL-SCNC: 35 MMOL/L (ref 21–32)
CREAT SERPL-MCNC: 0.3 MG/DL (ref 0.8–1.3)
ERYTHROCYTE [DISTWIDTH] IN BLOOD BY AUTOMATED COUNT: 14.5 % (ref 11.7–14.9)
GFR, ESTIMATED: >90 ML/MIN/1.73M2
GLUCOSE SERPL-MCNC: 140 MG/DL (ref 74–99)
HCT VFR BLD AUTO: 31.3 % (ref 42–52)
HGB BLD-MCNC: 10 G/DL (ref 13.5–18)
MCH RBC QN AUTO: 33 PG (ref 27–31)
MCHC RBC AUTO-ENTMCNC: 31.9 G/DL (ref 32–36)
MCV RBC AUTO: 103.3 FL (ref 78–100)
PLATELET, FLUORESCENCE: 224 K/UL (ref 140–440)
PMV BLD AUTO: 10.4 FL (ref 7.5–11.1)
POTASSIUM SERPL-SCNC: 3.7 MMOL/L (ref 3.5–5.1)
PROT SERPL-MCNC: 5.4 G/DL (ref 6.4–8.2)
RBC # BLD AUTO: 3.03 M/UL (ref 4.6–6.2)
SODIUM SERPL-SCNC: 140 MMOL/L (ref 136–145)
WBC OTHER # BLD: 4.9 K/UL (ref 4–10.5)

## 2025-02-27 PROCEDURE — 85027 COMPLETE CBC AUTOMATED: CPT

## 2025-02-27 PROCEDURE — 80053 COMPREHEN METABOLIC PANEL: CPT

## 2025-03-10 ENCOUNTER — HOSPITAL ENCOUNTER (OUTPATIENT)
Age: 80
Setting detail: SPECIMEN
Discharge: HOME | End: 2025-03-10
Payer: MEDICARE

## 2025-03-10 LAB
ALBUMIN SERPL-MCNC: 3.7 G/DL (ref 3.4–5)
ALBUMIN/GLOB SERPL: 1.5 {RATIO} (ref 1.1–2.2)
ALP SERPL-CCNC: 124 U/L (ref 40–129)
ALT SERPL-CCNC: 15 U/L (ref 10–40)
ANION GAP SERPL CALCULATED.3IONS-SCNC: 11 MMOL/L (ref 9–17)
AST SERPL-CCNC: 27 U/L (ref 15–37)
BILIRUB SERPL-MCNC: 1.1 MG/DL (ref 0–1)
BUN SERPL-MCNC: 9 MG/DL (ref 7–20)
CALCIUM SERPL-MCNC: 9.3 MG/DL (ref 8.3–10.6)
CHLORIDE SERPL-SCNC: 94 MMOL/L (ref 99–110)
CO2 SERPL-SCNC: 33 MMOL/L (ref 21–32)
CREAT SERPL-MCNC: 0.4 MG/DL (ref 0.8–1.3)
ERYTHROCYTE [DISTWIDTH] IN BLOOD BY AUTOMATED COUNT: 19.2 % (ref 11.7–14.9)
GFR, ESTIMATED: >90 ML/MIN/1.73M2
GLUCOSE SERPL-MCNC: 221 MG/DL (ref 74–99)
HCT VFR BLD AUTO: 31.4 % (ref 42–52)
HGB BLD-MCNC: 11.5 G/DL (ref 13.5–18)
MCH RBC QN AUTO: 41.8 PG (ref 27–31)
MCHC RBC AUTO-ENTMCNC: 36.6 G/DL (ref 32–36)
MCV RBC AUTO: 114.2 FL (ref 78–100)
PLATELET # BLD AUTO: 208 K/UL (ref 140–440)
PMV BLD AUTO: 10.5 FL (ref 7.5–11.1)
POTASSIUM SERPL-SCNC: 3.9 MMOL/L (ref 3.5–5.1)
PROT SERPL-MCNC: 6.1 G/DL (ref 6.4–8.2)
RBC # BLD AUTO: 2.75 M/UL (ref 4.6–6.2)
SODIUM SERPL-SCNC: 139 MMOL/L (ref 136–145)
WBC OTHER # BLD: 4.4 K/UL (ref 4–10.5)

## 2025-03-10 PROCEDURE — 85027 COMPLETE CBC AUTOMATED: CPT

## 2025-03-10 PROCEDURE — 80053 COMPREHEN METABOLIC PANEL: CPT

## 2025-03-11 LAB — ADDITIONAL COMMENT:: NORMAL

## 2025-03-17 ENCOUNTER — HOSPITAL ENCOUNTER (OUTPATIENT)
Age: 80
Setting detail: SPECIMEN
Discharge: HOME | End: 2025-03-17
Payer: MEDICARE

## 2025-03-17 LAB
ALBUMIN SERPL-MCNC: 3.8 G/DL (ref 3.4–5)
ALBUMIN/GLOB SERPL: 1.5 {RATIO} (ref 1.1–2.2)
ALP SERPL-CCNC: 127 U/L (ref 40–129)
ALT SERPL-CCNC: 15 U/L (ref 10–40)
ANION GAP SERPL CALCULATED.3IONS-SCNC: 7 MMOL/L (ref 9–17)
AST SERPL-CCNC: 27 U/L (ref 15–37)
BILIRUB SERPL-MCNC: 1.4 MG/DL (ref 0–1)
BUN SERPL-MCNC: 13 MG/DL (ref 7–20)
CALCIUM SERPL-MCNC: 9.2 MG/DL (ref 8.3–10.6)
CHLORIDE SERPL-SCNC: 99 MMOL/L (ref 99–110)
CO2 SERPL-SCNC: 39 MMOL/L (ref 21–32)
CREAT SERPL-MCNC: 0.5 MG/DL (ref 0.8–1.3)
ERYTHROCYTE [DISTWIDTH] IN BLOOD BY AUTOMATED COUNT: 14.2 % (ref 11.7–14.9)
GFR, ESTIMATED: >90 ML/MIN/1.73M2
GLUCOSE SERPL-MCNC: 156 MG/DL (ref 74–99)
HCT VFR BLD AUTO: 40.7 % (ref 42–52)
HGB BLD-MCNC: 12.5 G/DL (ref 13.5–18)
MCH RBC QN AUTO: 33.9 PG (ref 27–31)
MCHC RBC AUTO-ENTMCNC: 30.7 G/DL (ref 32–36)
MCV RBC AUTO: 110.3 FL (ref 78–100)
PLATELET # BLD AUTO: 197 K/UL (ref 140–440)
PMV BLD AUTO: 10.5 FL (ref 7.5–11.1)
POTASSIUM SERPL-SCNC: 4.6 MMOL/L (ref 3.5–5.1)
PROT SERPL-MCNC: 6.4 G/DL (ref 6.4–8.2)
RBC # BLD AUTO: 3.69 M/UL (ref 4.6–6.2)
SODIUM SERPL-SCNC: 145 MMOL/L (ref 136–145)
WBC OTHER # BLD: 4.9 K/UL (ref 4–10.5)

## 2025-03-17 PROCEDURE — 80053 COMPREHEN METABOLIC PANEL: CPT

## 2025-03-17 PROCEDURE — 85027 COMPLETE CBC AUTOMATED: CPT

## (undated) DEVICE — Device

## (undated) DEVICE — Z DISCONTINUED (USE MFG CAT MVABO)  TUBING GAS SAMPLING STD 6.5 FT FEMALE CONN SMRT CAPNOLINE

## (undated) DEVICE — COVER,C-ARM,41X74: Brand: MEDLINE

## (undated) DEVICE — BLADE CLIPPER GEN PURP NS

## (undated) DEVICE — GUIDEWIRE ENDOSCP L450CM DIA0.035IN BILI STR RND TIP HI

## (undated) DEVICE — SPONGE DRN W4XL4IN RAYON/POLYESTER 6 PLY NONWOVEN PRECUT

## (undated) DEVICE — SOLUTION IV IRRIG POUR BRL 0.9% SODIUM CHL 2F7124

## (undated) DEVICE — ELECTRODE ES AD CRDLSS PT RET REM POLYHESIVE

## (undated) DEVICE — BAG SPEC REM 224ML W4XL6IN DIA10MM 1 HND GYN DISP ENDOPCH

## (undated) DEVICE — GLOVE SURG SZ 65 CRM LTX FREE POLYISOPRENE POLYMER BEAD ANTI

## (undated) DEVICE — NEEDLE INSUF L120MM DIA2MM DISP FOR PNEUMOPERI ENDOPATH

## (undated) DEVICE — CIRCUIT BREATHING SINGLE LIMB AD 72 IN 3 LT 2 FILTER LIMBO

## (undated) DEVICE — CHLORAPREP 26ML ORANGE

## (undated) DEVICE — TROCAR: Brand: KII FIOS FIRST ENTRY

## (undated) DEVICE — CONTAINER,SPECIMEN,OR STERILE,4OZ: Brand: MEDLINE

## (undated) DEVICE — SET TBNG DISP TIP FOR AHTO

## (undated) DEVICE — DRAIN SURG 15FR SIL RND CHN W/ TRCR FULL FLUT DBL WRP TRAD

## (undated) DEVICE — Z INACTIVE USE 2641839 CLIP INT M L POLYMER LOK LIG HEM O LOK

## (undated) DEVICE — SOLUTION IV 1000ML 0.9% SOD CHL FOR IRRIG PLAS CONT

## (undated) DEVICE — SPHINCTEROTOME 44 20MM TRUETOME

## (undated) DEVICE — SUTURE SZ 0 27IN 5/8 CIR UR-6  TAPER PT VIOLET ABSRB VICRYL J603H

## (undated) DEVICE — RETRIEVAL BALLOON CATHETER: Brand: EXTRACTOR™ PRO XL

## (undated) DEVICE — GLOVE ORANGE PI 7   MSG9070

## (undated) DEVICE — Z DUP USE 2257490 ADHESIVE SKIN CLSRE 036ML TPCL 2CTL CNCRLTE HIGH VSCSTY DRMB

## (undated) DEVICE — RESERVOIR,SUCTION,100CC,SILICONE: Brand: MEDLINE

## (undated) DEVICE — TUBING INSUFFLATOR HEAT HI FLO SET PNEUMOCLEAR

## (undated) DEVICE — SUTURE VCRL SZ 4-0 L18IN ABSRB UD L19MM PS-2 3/8 CIR PRIM J496H

## (undated) DEVICE — SYRINGE MED 20ML STD CLR PLAS LUERLOCK TIP N CTRL DISP

## (undated) DEVICE — SNARE POLYP M W27MMXL240CM OVL STIFF DISP CAPTIVATOR

## (undated) DEVICE — TROCAR: Brand: KII® SLEEVE

## (undated) DEVICE — MITT SURG PREP L ADH DISPOSABLE

## (undated) DEVICE — TOWEL,OR,DSP,ST,BLUE,STD,6/PK,12PK/CS: Brand: MEDLINE